# Patient Record
Sex: MALE | Race: OTHER | NOT HISPANIC OR LATINO | Employment: OTHER | URBAN - METROPOLITAN AREA
[De-identification: names, ages, dates, MRNs, and addresses within clinical notes are randomized per-mention and may not be internally consistent; named-entity substitution may affect disease eponyms.]

---

## 2017-01-17 ENCOUNTER — HOSPITAL ENCOUNTER (OUTPATIENT)
Dept: RADIOLOGY | Facility: HOSPITAL | Age: 82
Discharge: HOME | End: 2017-01-17
Attending: FAMILY MEDICINE
Payer: MEDICARE

## 2017-01-17 ENCOUNTER — HOSPITAL ENCOUNTER (EMERGENCY)
Facility: HOSPITAL | Age: 82
Discharge: LEFT BEFORE DISCHARGE | End: 2017-01-17
Attending: EMERGENCY MEDICINE
Payer: MEDICARE

## 2017-01-17 ENCOUNTER — OFFICE VISIT (OUTPATIENT)
Dept: INTERNAL MEDICINE | Facility: CLINIC | Age: 82
End: 2017-01-17
Payer: MEDICARE

## 2017-01-17 VITALS
HEART RATE: 64 BPM | BODY MASS INDEX: 27.09 KG/M2 | SYSTOLIC BLOOD PRESSURE: 162 MMHG | TEMPERATURE: 98 F | HEIGHT: 60 IN | OXYGEN SATURATION: 98 % | WEIGHT: 138 LBS | DIASTOLIC BLOOD PRESSURE: 86 MMHG

## 2017-01-17 VITALS
TEMPERATURE: 98 F | HEIGHT: 60 IN | HEART RATE: 67 BPM | OXYGEN SATURATION: 96 % | RESPIRATION RATE: 18 BRPM | BODY MASS INDEX: 26.5 KG/M2 | DIASTOLIC BLOOD PRESSURE: 71 MMHG | WEIGHT: 135 LBS | SYSTOLIC BLOOD PRESSURE: 139 MMHG

## 2017-01-17 DIAGNOSIS — M25.562 LEFT KNEE PAIN: Primary | ICD-10-CM

## 2017-01-17 DIAGNOSIS — M25.562 LEFT KNEE PAIN, UNSPECIFIED CHRONICITY: ICD-10-CM

## 2017-01-17 DIAGNOSIS — M25.562 LEFT KNEE PAIN, UNSPECIFIED CHRONICITY: Primary | ICD-10-CM

## 2017-01-17 PROCEDURE — 99282 EMERGENCY DEPT VISIT SF MDM: CPT | Mod: 27

## 2017-01-17 PROCEDURE — 99999 PR PBB SHADOW E&M-EST. PATIENT-LVL IV: CPT | Mod: PBBFAC,,, | Performed by: FAMILY MEDICINE

## 2017-01-17 PROCEDURE — 99213 OFFICE O/P EST LOW 20 MIN: CPT | Mod: S$PBB,,, | Performed by: FAMILY MEDICINE

## 2017-01-17 PROCEDURE — 73562 X-RAY EXAM OF KNEE 3: CPT | Mod: 26,LT,, | Performed by: RADIOLOGY

## 2017-01-17 PROCEDURE — 73560 X-RAY EXAM OF KNEE 1 OR 2: CPT | Mod: 26,59,RT, | Performed by: RADIOLOGY

## 2017-01-17 RX ORDER — IBUPROFEN 800 MG/1
800 TABLET ORAL 3 TIMES DAILY
Qty: 20 TABLET | Refills: 1 | Status: ON HOLD | OUTPATIENT
Start: 2017-01-17 | End: 2017-11-26 | Stop reason: HOSPADM

## 2017-01-17 NOTE — ED PROVIDER NOTES
SCRIBE #1 NOTE: I, Phillip Sanchez, am scribing for, and in the presence of, Ya Hare MD. I have scribed the entire note.      History      Chief Complaint   Patient presents with    Knee Pain     c/o left knee pain, hurts to bend        Review of patient's allergies indicates:  No Known Allergies     HPI   HPI    1/17/2017, 10:46 AM   History obtained from the daughter as  and patient (Offered , daughter states she wanted to translate for him)      History of Present Illness: Long Hines is a 87 y.o. male patient who presents to the Emergency Department for L knee pain  which onset gradually 2 months ago. Symptoms are intermittent and moderate in severity. Sx are exacerbated by nothing and relieved by nothing. No other sxs reported. Pt denies any recent injury. Patient denies any fever, N/V/D, chills, weakness/numbness, swelling, ecchymosis,  and all other sxs at this time. No further complaints or concerns at this time.     Arrival mode: Personal vehicle     PCP: Penny Watson DO       Past Medical History:  No past medical history on file.    Past Surgical History:  Past Surgical History   Procedure Laterality Date    Abdominal surgery           Family History:  Family History   Problem Relation Age of Onset    Cancer Neg Hx     Diabetes Neg Hx     Heart disease Neg Hx     Hypertension Neg Hx     Kidney disease Neg Hx     Stroke Neg Hx        Social History:  Social History     Social History Main Topics    Smoking status: Former Smoker    Smokeless tobacco: Unknown    Alcohol use No    Drug use: No    Sexual activity: Unknown       ROS   Review of Systems   Constitutional: Negative for chills and fever.   HENT: Negative for congestion and sore throat.    Respiratory: Negative for chest tightness and shortness of breath.    Cardiovascular: Negative for chest pain.   Gastrointestinal: Negative for abdominal pain, nausea and vomiting.   Genitourinary: Negative for  "difficulty urinating and dysuria.   Musculoskeletal: Positive for arthralgias (L knee). Negative for back pain and neck pain.   Skin: Negative for rash.   Neurological: Negative for dizziness, numbness and headaches.   Psychiatric/Behavioral: Negative for agitation and confusion.   All other systems reviewed and are negative.      Physical Exam    Initial Vitals   BP Pulse Resp Temp SpO2   01/17/17 1031 01/17/17 1031 01/17/17 1031 01/17/17 1031 01/17/17 1031   139/71 67 18 98 °F (36.7 °C) 96 %      Physical Exam  Nursing Notes and Vital Signs Reviewed.  Constitutional: Patient is in no apparent distress. Awake and alert. Well-developed and well-nourished.  Head: Atraumatic. Normocephalic.  Eyes: PERRL. EOM intact. Conjunctivae are not pale. No scleral icterus.  ENT: Mucous membranes are moist.     Neck: Supple. Full ROM. No JVD.   Cardiovascular: Regular rate. Regular rhythm. No murmurs, rubs, or gallops. Distal pulses are 2+ and symmetric.  Pulmonary/Chest: No respiratory distress. Clear to auscultation bilaterally. No wheezing, rales, or rhonchi.  Abdominal: Soft and non-distended.    Musculoskeletal: Moves all extremities. No obvious deformities. No edema. No calf tenderness.  Left Knee:  No obvious deformity. There is no swelling.  There is no tenderness.  No increased warmth, erythema, induration or fluctuance.  No ligament laxity.  No crepitance appreciated when ROM. DP and PT pulses are 2+.  Normal capillary refill.  Distal sensation is intact.  Skin: Warm and dry.  Neurological:  Alert, awake, and appropriate.  Normal speech.  No acute focal neurological deficits are appreciated.  Psychiatric: Normal affect. Good eye contact. Appropriate in content.    ED Course    Procedures  ED Vital Signs:  Vitals:    01/17/17 1031   BP: 139/71   Pulse: 67   Resp: 18   Temp: 98 °F (36.7 °C)   TempSrc: Oral   SpO2: 96%   Weight: 61.2 kg (135 lb)   Height: 4' 10" (1.473 m)       Abnormal Lab Results:  Labs Reviewed - No " data to display         Imaging Results:  Imaging Results     None       Canceled secondary to pt eloping out of the ED           The Emergency Provider reviewed the vital signs and test results, which are outlined above.    ED Discussion     11:03 AM: Pt has eloped at this time. Pt has eloped before the x-ray could be completed.     ED Medication(s):  Medications - No data to display    There are no discharge medications for this patient.            Medical Decision Making              Scribe Attestation:   Scribe #1: I performed the above scribed service and the documentation accurately describes the services I performed. I attest to the accuracy of the note.    Attending:   Physician Attestation Statement for Scribe #1: I, Ya Hare MD, personally performed the services described in this documentation, as scribed by Phillip Sanchez, in my presence, and it is both accurate and complete.          Clinical Impression       ICD-10-CM ICD-9-CM   1. Left knee pain M25.562 719.46       Disposition:   Disposition: Eloped  Condition: Stable         Ya Hare MD  01/17/17 2159

## 2017-01-17 NOTE — PROGRESS NOTES
Subjective:       Patient ID: Long Hines is a 87 y.o. male.    Chief Complaint: Knee Pain    HPI Daughter presents with patient to translate. Difficult to get history because she enters saying her father's knee hurts and he needs xray and prescription.     Knee Pain: Patient presents with knee swelling involving the  left knee. Onset of the symptoms was 2 months ago. Inciting event: none known. Current symptoms include swelling. Pain is aggravated by bending at the knee.  Patient has had no prior knee problems. Evaluation to date: none. Treatment to date: none.    Not taking anything over the counter for the pain.   Pain reported to have started in foot and went up to knee.   Pain is off and on.   He hasn't done anything to see if that would make it better. Bending it makes the pain worse. He gardens a lot.      Review of Systems   Constitutional: Negative for activity change and appetite change.   Cardiovascular: Negative for leg swelling.   Musculoskeletal: Negative for back pain, gait problem and neck pain.         Objective:        Physical Exam   Constitutional: He appears well-developed and well-nourished.   HENT:   Head: Normocephalic and atraumatic.   Musculoskeletal:        Legs:  Nursing note and vitals reviewed.        Assessment/Plan:     Left knee pain, unspecified chronicity  -     X-ray Knee Ortho Left; Future; Expected date: 1/17/17  -     ibuprofen (ADVIL,MOTRIN) 800 MG tablet; Take 1 tablet (800 mg total) by mouth 3 (three) times daily.  Dispense: 20 tablet; Refill: 1  -     US Soft Tissue Misc; Future; Expected date: 1/17/17  Findings:  AP standing views of both knees as well as lateral and Merchant views of the left knee and a Merchant view of the right knee were obtained.  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  Suprapatellar joint effusion present on the left.  No acute fracture or dislocation.    Potential for baker's cyst considering history. Will follow up on ultrasound.       Informed daughter to be careful with medication because of potential complications with the stomach and blood pressure. Ibuprofen is not something that I would recommend he take daily and if he does do not take this daily for more than 1-2 weeks.     No Follow-up on file.    Le Henson MD  ON   Family Medicine

## 2017-01-17 NOTE — MR AVS SNAPSHOT
[facility]  [address] [address]  Phone: [phone]  Fax: [phone]                  Long Hines   2017 11:20 AM   Office Visit    Description:  Male : 1929   Provider:  Le Henson MD   Department:  [facility] - Internal Medicine           Reason for Visit     Knee Pain           Diagnoses this Visit        Comments    Left knee pain, unspecified chronicity    -  Primary            To Do List           Future Appointments        Provider Department Dept Phone    2017 1:15 PM ON XR1- [facility] [phone]    2017 3:30 PM ON US1 [facility] [phone]      Goals (5 Years of Data)     None       These Medications        Disp Refills Start End    ibuprofen (ADVIL,MOTRIN) 800 MG tablet 20 tablet 1 2017     Take 1 tablet (800 mg total) by mouth 3 (three) times daily. - Oral    Pharmacy: [facility] [address] [place] [address] AT [facility] Ph #: [phone]         [facility]     [facility] Care [facility] -  Assistance  Registered nurses in the [facility] provide clinical advisement, health education, appointment booking, and other advisory services.  Call for this free service at [phone].             Medications           START taking these NEW medications        Refills    ibuprofen (ADVIL,MOTRIN) 800 MG tablet 1    Sig: Take 1 tablet (800 mg total) by mouth 3 (three) times daily.    Class: Normal    Route: Oral           Verify that the below list of medications is an accurate representation of the medications you are currently taking.  If none reported, the list may be blank. If incorrect, please contact your healthcare provider. Carry this list with you in case of emergency.           Current Medications     ibuprofen (ADVIL,MOTRIN) 800 MG tablet Take 1 tablet (800 mg total) by mouth 3 (three) times daily.  "          Clinical Reference Information           Vital Signs - Last Recorded  Most recent update: 1/17/2017 11:22 AM by Jessica Ron LPN    BP Pulse Temp Ht Wt SpO2    (!) 162/86 64 97.8 °F (36.6 °C) (Tympanic) 4' 10" (1.473 m) 62.6 kg (138 lb 0.1 oz) 98%    BMI                28.84 kg/m2          Blood Pressure          Most Recent Value    BP  (!)  162/86      Allergies as of 1/17/2017     No Known Allergies      Immunizations Administered on Date of Encounter - 1/17/2017     None      Orders Placed During Today's Visit     Future Labs/Procedures Expected by Expires    US Soft Tissue Misc  1/17/2017 1/17/2018    X-ray Knee Ortho Left  1/17/2017 1/17/2018      [facility] Sign-Up     Activating your [facility] account is as easy as 1-2-3!     1) Visit [email or web address], select Sign Up Now, enter this activation code and your date of birth, then select Next.  [email or web address]  Expires: 3/3/2017 11:50 AM      2) Create a username and password to use when you visit [facility] in the future and select a security question in case you lose your password and select Next.    3) Enter your e-mail address and click Sign Up!    Additional Information  If you have questions, please e-mail [email or web address] or call [phone] to talk to our [facility] staff. Remember, [facility] is NOT to be used for urgent needs. For medical emergencies, dial 911.         "

## 2017-01-18 ENCOUNTER — TELEPHONE (OUTPATIENT)
Dept: INTERNAL MEDICINE | Facility: CLINIC | Age: 82
End: 2017-01-18

## 2017-01-18 NOTE — TELEPHONE ENCOUNTER
Pt rescheduled US for next week, Tues 01/24/17, scheduled appt with Ms Fitzpatrick.  pts daughter, Marino verbalized understanding.

## 2017-01-18 NOTE — TELEPHONE ENCOUNTER
----- Message from Le Henson MD sent at 1/18/2017  9:37 AM CST -----  Please schedule ortho appointment. Please call daughter to schedule. He is scheduled for his ultrasound today.     Thanks

## 2017-01-18 NOTE — ED NOTES
Elope status noted-chart and provider note reviewed. Diagnostics resulted prior to departure, no further actions indicated or taken at this time.

## 2017-01-23 ENCOUNTER — TELEPHONE (OUTPATIENT)
Dept: RADIOLOGY | Facility: HOSPITAL | Age: 82
End: 2017-01-23

## 2017-01-24 ENCOUNTER — HOSPITAL ENCOUNTER (OUTPATIENT)
Dept: RADIOLOGY | Facility: HOSPITAL | Age: 82
Discharge: HOME | End: 2017-01-24
Attending: FAMILY MEDICINE
Payer: MEDICARE

## 2017-01-24 ENCOUNTER — OFFICE VISIT (OUTPATIENT)
Dept: ORTHOPEDICS | Facility: CLINIC | Age: 82
End: 2017-01-24
Payer: MEDICARE

## 2017-01-24 ENCOUNTER — TELEPHONE (OUTPATIENT)
Dept: INTERNAL MEDICINE | Facility: CLINIC | Age: 82
End: 2017-01-24

## 2017-01-24 VITALS
WEIGHT: 141 LBS | SYSTOLIC BLOOD PRESSURE: 157 MMHG | HEART RATE: 72 BPM | DIASTOLIC BLOOD PRESSURE: 76 MMHG | HEIGHT: 60 IN | BODY MASS INDEX: 27.68 KG/M2

## 2017-01-24 DIAGNOSIS — M76.30 ILIOTIBIAL BAND SYNDROME, UNSPECIFIED LATERALITY: Primary | ICD-10-CM

## 2017-01-24 DIAGNOSIS — M25.562 LEFT KNEE PAIN, UNSPECIFIED CHRONICITY: ICD-10-CM

## 2017-01-24 DIAGNOSIS — M17.12 PRIMARY OSTEOARTHRITIS OF LEFT KNEE: ICD-10-CM

## 2017-01-24 PROCEDURE — 99203 OFFICE O/P NEW LOW 30 MIN: CPT | Mod: S$PBB,,, | Performed by: PHYSICIAN ASSISTANT

## 2017-01-24 PROCEDURE — 99213 OFFICE O/P EST LOW 20 MIN: CPT | Mod: PBBFAC | Performed by: PHYSICIAN ASSISTANT

## 2017-01-24 PROCEDURE — 76882 US LMTD JT/FCL EVL NVASC XTR: CPT | Mod: 26,,, | Performed by: RADIOLOGY

## 2017-01-24 PROCEDURE — 99999 PR PBB SHADOW E&M-EST. PATIENT-LVL III: CPT | Mod: PBBFAC,,, | Performed by: PHYSICIAN ASSISTANT

## 2017-01-24 RX ORDER — DICLOFENAC SODIUM 10 MG/G
2 GEL TOPICAL 4 TIMES DAILY
Qty: 1 TUBE | Refills: 1 | Status: SHIPPED | OUTPATIENT
Start: 2017-01-24 | End: 2022-01-21

## 2017-01-24 NOTE — PATIENT INSTRUCTIONS
Iliotibial Band Stretch (Flexibility)    1. Stand next to a chair. Hold onto the chair with your right hand for support. Cross your right leg behind your left leg.  2. Lean your right hip toward the right. Feel the stretch at the outside of your hip.  3. Hold for 30 to 60 seconds. Then relax.  4. Repeat 2 times, or as instructed.  5. Switch sides and repeat.  6. Do this 3 times a day, or as instructed.     Tip: Dont bend forward or twist at the waist.   © 2054-0605 [employer]. [address] [place] [address]. All rights reserved. This information is not intended as a substitute for professional medical care. Always follow your healthcare professional's instructions.        Treatment for Iliotibial Band Syndrome  Iliotibial band syndrome, or IT band syndrome, is a condition that causes pain on the outside of the knee. It most often occurs in athletes, especially long-distance runners. It can happen if you cycle, ski, row, or play soccer. It can also occur in people who are starting to exercise.  Types of treatment  Treatment may include:  · Avoiding any activity that makes your knee pain worse for a while (like running), and returning to this activity slowly over time  · Icing the outside of your knee when it causes you pain  · Taking over-the-counter pain medicines  · Having corticosteroid injections, to reduce inflammation  · Making changes to your activity, like lowering your bicycle seat for cycling or improving your running form  · Practicing special exercises to stretch and strengthen the muscles around your hip and your knee  You may find it helpful to work with a physical therapist.  These treatments help most people with IT band syndrome. Your doctor may advise surgery if you still have severe symptoms after 6 months of other treatment. Your doctor will talk with you about the types of surgery.  Preventing IT band syndrome  You may be able to prevent IT band syndrome if  you:  · Run on even surfaces  · Replace your running shoes often  · Ease up on your training  · On a track, make sure you run in both directions  · Have an expert check your stance for running and other sporting activities  · Stretch your outer thigh and hamstrings often  If you are new to exercise, start slowly. Increase your activity over time.  Talk with your doctor or  for more advice.  When to call the healthcare provider  Call your healthcare provider right away if you have any of these:  · Symptoms that get worse, or dont get better with treatment  · New symptoms   © 8450-3338 [employer]. [address] [place] [address]. All rights reserved. This information is not intended as a substitute for professional medical care. Always follow your healthcare professional's instructions.        Understanding Iliotibial Band Syndrome  Iliotibial band syndrome, or IT band syndrome, is a condition that causes pain on the outside of the knee. It most often occurs in athletes, especially long-distance runners. It can happen if you cycle, ski, row, or play soccer. It can also occur in people who are starting to exercise.  What is the IT band?  The iliotibial (IT) band is a strong, thick band of tissue that runs down the outside of your thigh. It goes all the way from your hip bones to the top of your shinbone (tibia), just below your knee joint. The bones of your knee joint include your tibia, your thighbone (femur), and your kneecap (patella).  When you bend and straighten your leg, the IT band moves over the outer lower edge of your femur. Over time, bending and straightening your leg can cause the IT band to irritate nearby tissues and cause pain.  What causes IT band syndrome?  Researchers are still learning the exact cause of IT band syndrome. The pain may be caused by the IT band rubbing over the lower outer edge of the femur. This may cause inflammation in the bone, tendons, and small  fluid-filled sacs (bursa) in the area. The IT band may also compress the tissue under it and cause pain.  If you are a runner, you might be more likely to develop IT band syndrome if:  · You run on uneven or downhill terrain  · You run on worn-out shoes  · You run many miles per day  · Your legs slope a little inward from your knee to your ankle (bowlegs)  Symptoms of IT band syndrome  IT band syndrome causes pain on the outside of the knee. It might affect one or both of your knees. The pain is an aching, burning feeling that can spread up the thigh to the hip. You may feel this pain only when you exercise, such as while running. The pain may be worst right after you step on your foot. It may only happen near the end of your exercise. As the condition gets worse, pain may start earlier and continue after you have stopped exercising. Going up and down the stairs may make the pain worse.  Diagnosing IT band syndrome  Your doctor will ask about your health history and your symptoms. He or she will give you a physical exam. This will include an exam of your knee. The range of motion and strength of your knee will be tested. The doctor will look for areas of pain around your knee. The symptoms of IT band syndrome can be like those of osteoarthritis or a meniscal tear. Your doctor will need to make sure IT band syndrome is the cause of your symptoms. If the diagnosis is not clear, you may need imaging tests. These can include an X-ray or MRI scan.  © 4294-2287 [employer]. [address] [place] [address], [place] [address]. All rights reserved. This information is not intended as a substitute for professional medical care. Always follow your healthcare professional's instructions.

## 2017-01-24 NOTE — MR AVS SNAPSHOT
[facility]  [address] [facility]  [place] [address]  Phone: [phone]  Fax: [phone]                  Long Hines   2017 3:30 PM   Office Visit    Description:  Male : 1929   Provider:  Glenn Fitzpatrick PA-C   Department:  [facility]           Reason for Visit     Left Knee - Pain           Diagnoses this Visit        Comments    Iliotibial band syndrome, unspecified laterality    -  Primary     Primary osteoarthritis of left knee                To Do List           Future Appointments        Provider Department Dept Phone    2017 3:30 PM Glenn Fitzpatrick PA-C [facility] [phone]      Goals (5 Years of Data)     None       These Medications        Disp Refills Start End    diclofenac sodium 1 % Gel 1 Tube 1 2017 2/3/2017    Apply 2 g topically 4 (four) times daily. - Topical    Pharmacy: [facility] [address] [place] [address] Ph #: [phone]         [facility] On Call     [facility] On Call Nurse Care Line -  Assistance  Registered nurses in the [facility] On Call Center provide clinical advisement, health education, appointment booking, and other advisory services.  Call for this free service at [phone].             Medications           START taking these NEW medications        Refills    diclofenac sodium 1 % Gel 1    Sig: Apply 2 g topically 4 (four) times daily.    Class: Normal    Route: Topical           Verify that the below list of medications is an accurate representation of the medications you are currently taking.  If none reported, the list may be blank. If incorrect, please contact your healthcare provider. Carry this list with you in case of emergency.           Current Medications     diclofenac sodium 1 % Gel Apply 2 g topically 4 (four) times daily.    ibuprofen (ADVIL,MOTRIN) 800 MG tablet Take 1 tablet (800 mg total) by mouth 3 (three) times daily.  "          Clinical Reference Information           Vital Signs - Last Recorded  Most recent update: 1/24/2017  2:06 PM by Alyssa Odell    BP Pulse Ht Wt BMI    (!) 157/76 (BP Location: Left arm, Patient Position: Sitting, BP Method: Automatic) 72 4' 10.5" (1.486 m) 64 kg (140 lb 15.8 oz) 28.96 kg/m2      Blood Pressure          Most Recent Value    BP  (!)  157/76      Allergies as of 1/24/2017     No Known Allergies      Immunizations Administered on Date of Encounter - 1/24/2017     None      Orders Placed During Today's Visit      Normal Orders This Visit    Ambulatory Referral to Physical/Occupational Therapy       [employer] Sign-Up     Activating your [facility] account is as easy as 1-2-3!     1) Visit [email or web address], select Sign Up Now, enter this activation code and your date of birth, then select Next.  [email or web address]  Expires: 3/3/2017 11:50 AM      2) Create a username and password to use when you visit [facility] in the future and select a security question in case you lose your password and select Next.    3) Enter your e-mail address and click Sign Up!    Additional Information  If you have questions, please e-mail [email or web address] or call [phone] to talk to our [facility] staff. Remember, [facility] is NOT to be used for urgent needs. For medical emergencies, dial 911.         Instructions      Iliotibial Band Stretch (Flexibility)    1. Stand next to a chair. Hold onto the chair with your right hand for support. Cross your right leg behind your left leg.  2. Lean your right hip toward the right. Feel the stretch at the outside of your hip.  3. Hold for 30 to 60 seconds. Then relax.  4. Repeat 2 times, or as instructed.  5. Switch sides and repeat.  6. Do this 3 times a day, or as instructed.     Tip: Dont bend forward or twist at the waist.   © 5910-3671 [employer] [address] [place] [address], [place] [address]. All rights reserved. This information is not intended as a " substitute for professional medical care. Always follow your healthcare professional's instructions.        Treatment for Iliotibial Band Syndrome  Iliotibial band syndrome, or IT band syndrome, is a condition that causes pain on the outside of the knee. It most often occurs in athletes, especially long-distance runners. It can happen if you cycle, ski, row, or play soccer. It can also occur in people who are starting to exercise.  Types of treatment  Treatment may include:  · Avoiding any activity that makes your knee pain worse for a while (like running), and returning to this activity slowly over time  · Icing the outside of your knee when it causes you pain  · Taking over-the-counter pain medicines  · Having corticosteroid injections, to reduce inflammation  · Making changes to your activity, like lowering your bicycle seat for cycling or improving your running form  · Practicing special exercises to stretch and strengthen the muscles around your hip and your knee  You may find it helpful to work with a physical therapist.  These treatments help most people with IT band syndrome. Your doctor may advise surgery if you still have severe symptoms after 6 months of other treatment. Your doctor will talk with you about the types of surgery.  Preventing IT band syndrome  You may be able to prevent IT band syndrome if you:  · Run on even surfaces  · Replace your running shoes often  · Ease up on your training  · On a track, make sure you run in both directions  · Have an expert check your stance for running and other sporting activities  · Stretch your outer thigh and hamstrings often  If you are new to exercise, start slowly. Increase your activity over time.  Talk with your doctor or  for more advice.  When to call the healthcare provider  Call your healthcare provider right away if you have any of these:  · Symptoms that get worse, or dont get better with treatment  · New symptoms   © 3604-7563 [employer] [address] [place] [address], [place] [address]. All rights reserved. This information is not intended as a substitute for professional medical care. Always follow your healthcare professional's instructions.        Understanding Iliotibial Band Syndrome  Iliotibial band syndrome, or IT band syndrome, is a condition that causes pain on the outside of the knee. It most often occurs in athletes, especially long-distance runners. It can happen if you cycle, ski, row, or play soccer. It can also occur in people who are starting to exercise.  What is the IT band?  The iliotibial (IT) band is a strong, thick band of tissue that runs down the outside of your thigh. It goes all the way from your hip bones to the top of your shinbone (tibia), just below your knee joint. The bones of your knee joint include your tibia, your thighbone (femur), and your kneecap (patella).  When you bend and straighten your leg, the IT band moves over the outer lower edge of your femur. Over time, bending and straightening your leg can cause the IT band to irritate nearby tissues and cause pain.  What causes IT band syndrome?  Researchers are still learning the exact cause of IT band syndrome. The pain may be caused by the IT band rubbing over the lower outer edge of the femur. This may cause inflammation in the bone, tendons, and small fluid-filled sacs (bursa) in the area. The IT band may also compress the tissue under it and cause pain.  If you are a runner, you might be more likely to develop IT band syndrome if:  · You run on uneven or downhill terrain  · You run on worn-out shoes  · You run many miles per day  · Your legs slope a little inward from your knee to your ankle (bowlegs)  Symptoms of IT band syndrome  IT band syndrome causes pain on the outside of the knee. It might affect one or both of your knees. The pain is an aching, burning feeling that can spread up the thigh to the hip. You may feel this pain only when you  exercise, such as while running. The pain may be worst right after you step on your foot. It may only happen near the end of your exercise. As the condition gets worse, pain may start earlier and continue after you have stopped exercising. Going up and down the stairs may make the pain worse.  Diagnosing IT band syndrome  Your doctor will ask about your health history and your symptoms. He or she will give you a physical exam. This will include an exam of your knee. The range of motion and strength of your knee will be tested. The doctor will look for areas of pain around your knee. The symptoms of IT band syndrome can be like those of osteoarthritis or a meniscal tear. Your doctor will need to make sure IT band syndrome is the cause of your symptoms. If the diagnosis is not clear, you may need imaging tests. These can include an X-ray or MRI scan.  © 8386-7184 [employer]. [address] [place] [address], [place] [address]. All rights reserved. This information is not intended as a substitute for professional medical care. Always follow your healthcare professional's instructions.

## 2017-01-24 NOTE — PROGRESS NOTES
Subjective:      Patient ID: Long Hines is a 87 y.o. male.    Chief Complaint: Pain of the Left Knee    HPI Comments: Body part: Left Knee    Occupation: Never Worked in [place].  Previously a farmer.    Dominant hand: Right    Referred by: Le Henson MD    Date of Injury: None    Patient's visit goal: To find out what is wrong with his knee    Problem Description: Left Knee Pain since October 2016 without known cause.  He describes the discomfort in the lateral aspect of the knee and mainly when he flexes.  He complains of decreased flexion in that knee.  Intermittent swelling, but very mild..  Actually, he speaks no English.  He has lived in the [place] upwards of 25 years and has not worked since he has been here.  He lives with his daughter who is present to the examination and does all of the translating.  He saw urgent care and was given ibuprofen that has not helped his symptoms greatly.  No ice or heat.  No physical therapy.  No injections.  No history of knee complications.          Pain   This is a new problem. The current episode started more than 1 month ago (pain since about October 2016). The problem occurs intermittently. The problem has been gradually improving. Pertinent negatives include no abdominal pain, chest pain, chills, congestion, coughing, fever, joint swelling, nausea, numbness, rash or vomiting. The symptoms are aggravated by bending. He has tried NSAIDs for the symptoms. The treatment provided moderate relief.       Review of Systems   Constitution: Negative for chills, fever and weight loss.   HENT: Negative for congestion and hearing loss.    Eyes: Negative for double vision and pain.   Cardiovascular: Negative for chest pain and irregular heartbeat.   Respiratory: Negative for cough and shortness of breath.    Endocrine: Negative for polyuria.   Hematologic/Lymphatic: Does not bruise/bleed easily.   Skin: Negative for poor wound healing, rash and suspicious  lesions.   Musculoskeletal: Positive for joint pain. Negative for arthritis and joint swelling.   Gastrointestinal: Negative for abdominal pain, nausea and vomiting.   Genitourinary: Negative for bladder incontinence and frequency.   Neurological: Negative for loss of balance, numbness, paresthesias, sensory change and tremors.   Psychiatric/Behavioral: Negative for depression. The patient is not nervous/anxious.    Allergic/Immunologic: Negative for hives.         Objective:            General    Nursing note and vitals reviewed.  Constitutional: He is oriented to person, place, and time. He appears well-developed and well-nourished. No distress.   Neurological: He is alert and oriented to person, place, and time.   Psychiatric: He has a normal mood and affect. His behavior is normal. Judgment and thought content normal.     General Musculoskeletal Exam   Gait: abnormal       Right Knee Exam     Inspection   Erythema: absent  Swelling: present  Effusion: effusion    Tenderness   The patient is experiencing no tenderness.         Range of Motion   Extension: -5   Flexion: normal     Tests   Ligament Examination   MCL - Valgus: normal (0 to 2mm)  LCL - Varus: normal    Other   Muscle Tightness: hamstring tightness and quadriceps tightness    Left Knee Exam     Inspection   Erythema: absent  Swelling: absent  Effusion: absent    Tenderness   The patient tender to palpation of the iliotibial band.    Range of Motion   Extension: -5   Flexion: 100     Tests   Stability   MCL - Valgus: normal (0 to 2mm)  LCL - Varus: normal (0 to 2mm)    Other   Muscle Tightness: hamstring tightness and quadriceps tightness    Comments:  Clinical tenderness along the IT band.  Tightness of the band appreciated palpation.    Muscle Strength   Right Lower Extremity   Hip Abduction: 4/5   Quadriceps:  4/5   Hamstrin/5   Left Lower Extremity   Hip Abduction: 4/5   Quadriceps:  4/5   Hamstrin/5     Vascular Exam       Edema  Right  Lower Leg: absent  Left Lower Leg: absent              Assessment:       Encounter Diagnoses   Name Primary?    Iliotibial band syndrome, unspecified laterality Yes    Primary osteoarthritis of left knee           Plan:       Long was seen today for pain.    Diagnoses and all orders for this visit:    Iliotibial band syndrome, unspecified laterality  -     Ambulatory Referral to Physical/Occupational Therapy    Primary osteoarthritis of left knee  -     Ambulatory Referral to Physical/Occupational Therapy    Other orders  -     diclofenac sodium 1 % Gel; Apply 2 g topically 4 (four) times daily.   he will participate in a therapy program at [facility], close to his home.  He'll use a Voltaren cream as well.  I have not prescribed any oral anti-inflammatory medication.  He can use moist heat as well.  Although he does have degenerative arthritis in both knees, his symptoms appear to be more muscular in nature.  We'll treat him accordingly.    The patient understands, chooses and consents to this plan and accepts all   the risks which include but are not limited to the risks mentioned above.   Pt understands the alternative of having no testing, intervention or       treatment at this time. Pt left content and without questions.     Disclaimer: This note was prepared using a voice recognition system and is likely to have sound alike errors within the text.

## 2017-02-07 DIAGNOSIS — M17.12 PRIMARY OSTEOARTHRITIS OF LEFT KNEE: ICD-10-CM

## 2017-02-07 DIAGNOSIS — M76.30 ILIOTIBIAL BAND SYNDROME, UNSPECIFIED LATERALITY: Primary | ICD-10-CM

## 2017-07-19 ENCOUNTER — OFFICE VISIT (OUTPATIENT)
Dept: INTERNAL MEDICINE | Facility: CLINIC | Age: 82
End: 2017-07-19
Payer: MEDICARE

## 2017-07-19 ENCOUNTER — HOSPITAL ENCOUNTER (OUTPATIENT)
Dept: RADIOLOGY | Facility: HOSPITAL | Age: 82
Discharge: HOME | End: 2017-07-19
Attending: FAMILY MEDICINE
Payer: MEDICARE

## 2017-07-19 VITALS
DIASTOLIC BLOOD PRESSURE: 60 MMHG | BODY MASS INDEX: 27.14 KG/M2 | OXYGEN SATURATION: 97 % | HEIGHT: 60 IN | SYSTOLIC BLOOD PRESSURE: 120 MMHG | WEIGHT: 138.25 LBS | HEART RATE: 52 BPM | TEMPERATURE: 97 F

## 2017-07-19 DIAGNOSIS — Z13.220 SCREENING CHOLESTEROL LEVEL: ICD-10-CM

## 2017-07-19 DIAGNOSIS — R10.31 RIGHT LOWER QUADRANT ABDOMINAL PAIN: Primary | ICD-10-CM

## 2017-07-19 DIAGNOSIS — E66.3 OVERWEIGHT: ICD-10-CM

## 2017-07-19 DIAGNOSIS — R20.8 OTHER DISTURBANCES OF SKIN SENSATION: ICD-10-CM

## 2017-07-19 DIAGNOSIS — R10.31 RIGHT LOWER QUADRANT ABDOMINAL PAIN: ICD-10-CM

## 2017-07-19 PROCEDURE — 74020 XR ABDOMEN FLAT AND ERECT: CPT | Mod: TC

## 2017-07-19 PROCEDURE — 1159F MED LIST DOCD IN RCRD: CPT | Mod: ,,, | Performed by: FAMILY MEDICINE

## 2017-07-19 PROCEDURE — 99999 PR PBB SHADOW E&M-EST. PATIENT-LVL III: CPT | Mod: PBBFAC,,, | Performed by: FAMILY MEDICINE

## 2017-07-19 PROCEDURE — 99214 OFFICE O/P EST MOD 30 MIN: CPT | Mod: S$PBB,,, | Performed by: FAMILY MEDICINE

## 2017-07-19 PROCEDURE — 1125F AMNT PAIN NOTED PAIN PRSNT: CPT | Mod: ,,, | Performed by: FAMILY MEDICINE

## 2017-07-19 PROCEDURE — 74020 XR ABDOMEN FLAT AND ERECT: CPT | Mod: 26,,, | Performed by: RADIOLOGY

## 2017-07-19 RX ORDER — GABAPENTIN 100 MG/1
100 CAPSULE ORAL 3 TIMES DAILY
Qty: 90 CAPSULE | Refills: 1 | Status: SHIPPED | OUTPATIENT
Start: 2017-07-19 | End: 2017-12-12 | Stop reason: SDUPTHER

## 2017-07-19 NOTE — PROGRESS NOTES
Subjective:       Patient ID: Long Hines is a 87 y.o. male.    Chief Complaint: Abdominal Pain    HPI Mr. Hines presents today for abdominal pain. He presents with his daughter who translates for him. Had a surgery in [place] they are not sure what kind. It sounds like an appendectomy he was having pain prior and the scar is in the right lower quadrant.   The pain that he is complaining of today comes and goes and feels like a needle poking him. The pain is in the same location that he had his surgery. The pain lasts for seconds but it occurs repeatedly.     He has BM he says multiple times a day but it is a very small amount.     Review of Systems    Pertinent ROS listed in HPI    No past medical history on file.  Past Surgical History:   Procedure Laterality Date    ABDOMINAL SURGERY       Objective:        Physical Exam   Constitutional: He appears well-developed and well-nourished.   HENT:   Head: Normocephalic and atraumatic.   Abdominal: Bowel sounds are normal. He exhibits no distension. There is no tenderness. There is no guarding.   Neurological: He is alert.   Skin: Skin is warm.   Vitals reviewed.        Assessment/Plan:     Right lower quadrant abdominal pain  -     X-Ray Abdomen Flat And Erect; Future; Expected date: 07/19/2017  -     gabapentin (NEURONTIN) 100 MG capsule; Take 1 capsule (100 mg total) by mouth 3 (three) times daily.  Dispense: 90 capsule; Refill: 1  -     Comprehensive metabolic panel; Future; Expected date: 07/19/2017  -     Vitamin B12; Future; Expected date: 07/19/2017  Discussed surgery based on scar it may have been an appendectomy. I am not sure what he had done but the pain/sensation is in the area of the scar. This is why I started gabapentin at a low dose if it is related to nerve type pain.   Discussed possible constipation and need for a laxative will follow up on Xray results.   May consider CT in the future.     Screening cholesterol level  -     Lipid panel; Future;  Expected date: 07/19/2017    Overweight   -     Lipid panel; Future; Expected date: 07/19/2017    Other disturbances of skin sensation   -     Vitamin B12; Future; Expected date: 07/19/2017        Return if symptoms worsen or fail to improve.    Le Henson MD  [facility]   Family Medicine

## 2017-07-20 ENCOUNTER — TELEPHONE (OUTPATIENT)
Dept: INTERNAL MEDICINE | Facility: CLINIC | Age: 82
End: 2017-07-20

## 2017-07-20 NOTE — TELEPHONE ENCOUNTER
----- Message from Le Henson MD sent at 7/20/2017  8:52 AM CDT -----  One of the fats in his blood is elevated. Decreasing sugar intake, fat, fried foods and alcohol if he drinks may help decrease this level. He may need to be started on a medication for this. Let me know if he wants to try exercise and changing diet first.

## 2017-07-20 NOTE — TELEPHONE ENCOUNTER
----- Message from Le Henson MD sent at 7/19/2017 12:39 PM CDT -----  He has a large amount of stool and gas on xray. Laxative such as miralax or magnesium citrate in the glass jar should help with cleaning him out. Recommend taking a laxative for assistance. He also needs to increase water intake.

## 2017-08-01 ENCOUNTER — TELEPHONE (OUTPATIENT)
Dept: INTERNAL MEDICINE | Facility: CLINIC | Age: 82
End: 2017-08-01

## 2017-08-01 DIAGNOSIS — E78.1 HYPERTRIGLYCERIDEMIA: Primary | ICD-10-CM

## 2017-08-01 NOTE — TELEPHONE ENCOUNTER
----- Message from Marilee Siddiqui sent at 7/28/2017  4:15 PM CDT -----  Contact: Marino Hines -[phone]  Would like to know test results. Please call back at [phone]    Thanks,  Marilee Siddiqui

## 2017-08-02 RX ORDER — FENOFIBRATE 54 MG/1
54 TABLET ORAL DAILY
Qty: 90 TABLET | Refills: 3 | Status: SHIPPED | OUTPATIENT
Start: 2017-08-02 | End: 2017-12-12 | Stop reason: SDUPTHER

## 2017-08-02 NOTE — TELEPHONE ENCOUNTER
Medication sent to pharmacy. Labs need to be checked again in 6 months. Follow up with PCP if needed.

## 2017-11-25 ENCOUNTER — HOSPITAL ENCOUNTER (INPATIENT)
Facility: HOSPITAL | Age: 82
LOS: 1 days | Discharge: HOME | DRG: 149 | End: 2017-11-26
Attending: EMERGENCY MEDICINE
Payer: MEDICARE

## 2017-11-25 DIAGNOSIS — I63.9 CEREBROVASCULAR ACCIDENT (CVA), UNSPECIFIED MECHANISM: ICD-10-CM

## 2017-11-25 DIAGNOSIS — R42 DIZZINESS: ICD-10-CM

## 2017-11-25 DIAGNOSIS — R53.1 WEAKNESS: ICD-10-CM

## 2017-11-25 DIAGNOSIS — R07.9 CHEST PAIN: ICD-10-CM

## 2017-11-25 DIAGNOSIS — R42 DIZZINESS OF UNKNOWN CAUSE: Primary | ICD-10-CM

## 2017-11-25 PROBLEM — E78.1 HYPERTRIGLYCERIDEMIA WITHOUT HYPERCHOLESTEROLEMIA: Status: ACTIVE | Noted: 2017-11-25

## 2017-11-25 LAB
ALBUMIN SERPL BCP-MCNC: 3.4 G/DL
ALP SERPL-CCNC: 79 U/L
ALT SERPL W/O P-5'-P-CCNC: 39 U/L
ANION GAP SERPL CALC-SCNC: 7 MMOL/L
AST SERPL-CCNC: 43 U/L
BASOPHILS # BLD AUTO: 0.02 K/UL
BASOPHILS NFR BLD: 0.5 %
BILIRUB SERPL-MCNC: 0.6 MG/DL
BNP SERPL-MCNC: 194 PG/ML
BUN SERPL-MCNC: 20 MG/DL
CALCIUM SERPL-MCNC: 9 MG/DL
CHLORIDE SERPL-SCNC: 107 MMOL/L
CO2 SERPL-SCNC: 25 MMOL/L
CREAT SERPL-MCNC: 0.9 MG/DL
DIFFERENTIAL METHOD: ABNORMAL
EOSINOPHIL # BLD AUTO: 0.2 K/UL
EOSINOPHIL NFR BLD: 4.4 %
ERYTHROCYTE [DISTWIDTH] IN BLOOD BY AUTOMATED COUNT: 12.7 %
EST. GFR  (AFRICAN AMERICAN): >60 ML/MIN/1.73 M^2
EST. GFR  (NON AFRICAN AMERICAN): >60 ML/MIN/1.73 M^2
GLUCOSE SERPL-MCNC: 99 MG/DL
HCT VFR BLD AUTO: 42.4 %
HGB BLD-MCNC: 15.3 G/DL
LYMPHOCYTES # BLD AUTO: 1.7 K/UL
LYMPHOCYTES NFR BLD: 40.4 %
MCH RBC QN AUTO: 31.1 PG
MCHC RBC AUTO-ENTMCNC: 36.1 G/DL
MCV RBC AUTO: 86 FL
MONOCYTES # BLD AUTO: 0.4 K/UL
MONOCYTES NFR BLD: 9.5 %
NEUTROPHILS # BLD AUTO: 1.9 K/UL
NEUTROPHILS NFR BLD: 45.2 %
PLATELET # BLD AUTO: 135 K/UL
PMV BLD AUTO: 10.8 FL
POTASSIUM SERPL-SCNC: 3.9 MMOL/L
PROT SERPL-MCNC: 7.4 G/DL
RBC # BLD AUTO: 4.92 M/UL
SODIUM SERPL-SCNC: 139 MMOL/L
TROPONIN I SERPL DL<=0.01 NG/ML-MCNC: 0.03 NG/ML
TROPONIN I SERPL DL<=0.01 NG/ML-MCNC: 0.04 NG/ML
TROPONIN I SERPL DL<=0.01 NG/ML-MCNC: 0.04 NG/ML
WBC # BLD AUTO: 4.11 K/UL

## 2017-11-25 PROCEDURE — G0378 HOSPITAL OBSERVATION PER HR: HCPCS

## 2017-11-25 PROCEDURE — 80053 COMPREHEN METABOLIC PANEL: CPT

## 2017-11-25 PROCEDURE — 93005 ELECTROCARDIOGRAM TRACING: CPT

## 2017-11-25 PROCEDURE — 84484 ASSAY OF TROPONIN QUANT: CPT

## 2017-11-25 PROCEDURE — 99285 EMERGENCY DEPT VISIT HI MDM: CPT | Mod: 25

## 2017-11-25 PROCEDURE — 93010 ELECTROCARDIOGRAM REPORT: CPT | Mod: 76,,, | Performed by: INTERNAL MEDICINE

## 2017-11-25 PROCEDURE — 83880 ASSAY OF NATRIURETIC PEPTIDE: CPT

## 2017-11-25 PROCEDURE — 93010 ELECTROCARDIOGRAM REPORT: CPT | Mod: ,,, | Performed by: INTERNAL MEDICINE

## 2017-11-25 PROCEDURE — 25000003 PHARM REV CODE 250

## 2017-11-25 PROCEDURE — 85025 COMPLETE CBC W/AUTO DIFF WBC: CPT

## 2017-11-25 PROCEDURE — A9585 GADOBUTROL INJECTION: HCPCS

## 2017-11-25 PROCEDURE — 84484 ASSAY OF TROPONIN QUANT: CPT | Mod: 91

## 2017-11-25 PROCEDURE — 21400001 HC TELEMETRY ROOM

## 2017-11-25 PROCEDURE — 25500020 PHARM REV CODE 255

## 2017-11-25 RX ORDER — GADOBUTROL 604.72 MG/ML
6 INJECTION INTRAVENOUS
Status: COMPLETED | OUTPATIENT
Start: 2017-11-25 | End: 2017-11-25

## 2017-11-25 RX ORDER — ASPIRIN 81 MG/1
81 TABLET ORAL DAILY
Status: DISCONTINUED | OUTPATIENT
Start: 2017-11-25 | End: 2017-11-25

## 2017-11-25 RX ORDER — ACETAMINOPHEN 325 MG/1
650 TABLET ORAL EVERY 6 HOURS PRN
Status: DISCONTINUED | OUTPATIENT
Start: 2017-11-25 | End: 2017-11-26 | Stop reason: HOSPADM

## 2017-11-25 RX ORDER — FENOFIBRATE 48 MG/1
48 TABLET, FILM COATED ORAL DAILY
Status: DISCONTINUED | OUTPATIENT
Start: 2017-11-25 | End: 2017-11-26 | Stop reason: HOSPADM

## 2017-11-25 RX ADMIN — ASPIRIN 81 MG: 81 TABLET, COATED ORAL at 04:11

## 2017-11-25 RX ADMIN — GADOBUTROL 6 ML: 604.72 INJECTION INTRAVENOUS at 04:11

## 2017-11-25 RX ADMIN — FENOFIBRATE 48 MG: 48 TABLET ORAL at 05:11

## 2017-11-25 NOTE — ED PROVIDER NOTES
SCRIBE #1 NOTE: I, Corinne Mack, am scribing for, and in the presence of, Khoa Tucker Jr., MD. I have scribed the entire note.      History      Chief Complaint   Patient presents with    Extremity Weakness    Dizziness       Review of patient's allergies indicates:  No Known Allergies     HPI   HPI    11/25/2017, 12:06 PM   History obtained from the patient   Interpreted by family      History of Present Illness: Long Roth is a 87 y.o. male patient who presents to the Emergency Department for weakness which onset gradually yesterday. Symptoms are constant and moderate in severity. Pt's family states that the pt is having trouble with his balance and is weaker to his LLE. No mitigating or exacerbating factors reported. Associated sxs include dizziness. Patient denies any fever, chills, CP, SOB, N/V/D, back pain, neck pain, HA, numbness, facial asymmetry, and all other sxs at this time. No prior Tx reported. No further complaints or concerns at this time.       Arrival mode: Personal vehicle      PCP: Penny Watson DO       Past Medical History:  No past medical history on file.    Past Surgical History:  Past Surgical History:   Procedure Laterality Date    ABDOMINAL SURGERY           Family History:  Family History   Problem Relation Age of Onset    Cancer Neg Hx     Diabetes Neg Hx     Heart disease Neg Hx     Hypertension Neg Hx     Kidney disease Neg Hx     Stroke Neg Hx        Social History:  Social History     Social History Main Topics    Smoking status: Former Smoker    Smokeless tobacco: Never Used    Alcohol use No    Drug use: No    Sexual activity: Not on file       ROS   Review of Systems   Constitutional: Negative for chills and fever.   Respiratory: Negative for cough and shortness of breath.    Cardiovascular: Negative for chest pain and leg swelling.   Gastrointestinal: Negative for abdominal pain, diarrhea, nausea and vomiting.   Musculoskeletal: Negative for back pain, neck  pain and neck stiffness.   Skin: Negative for rash and wound.   Neurological: Positive for dizziness and weakness. Negative for facial asymmetry, light-headedness, numbness and headaches.   All other systems reviewed and are negative.    Physical Exam      Initial Vitals [11/25/17 1138]   BP Pulse Resp Temp SpO2   (!) 121/56 60 16 98 °F (36.7 °C) 95 %      MAP       77.67          Physical Exam  Nursing Notes and Vital Signs Reviewed.  Constitutional: Patient is in no apparent distress. Well-developed and well-nourished. Elderly.  Head: Atraumatic. Normocephalic.  Eyes: PERRL. EOM intact. Conjunctivae are not pale. No scleral icterus.  Neck: Supple. Full ROM. No lymphadenopathy.  Cardiovascular: Regular rate. Regular rhythm. No murmurs, rubs, or gallops. Distal pulses are 2+ and symmetric.  Pulmonary/Chest: No respiratory distress. Clear to auscultation bilaterally. No wheezing or rales.  Abdominal: Soft and non-distended.    Musculoskeletal: Moves all extremities. No obvious deformities. No edema.   Skin: Warm and dry.  Neurological: Patient is alert and oriented to person, place and time. Pupils ERRL and EOM normal. Cranial nerves II-XII are intact. Strength is full bilaterally; it is equal and 5/5 in bilateral upper and R lower extremities. Strength is 4/5 to the LLE. There is no pronator drift of outstretched arms. Speech is clear and normal. No acute focal neurological deficits noted.  Psychiatric: Normal affect. Good eye contact. Appropriate in content.    ED Course    Procedures  ED Vital Signs:  Vitals:    11/25/17 1138 11/25/17 1445 11/25/17 1502 11/25/17 1510   BP: (!) 121/56 (!) 146/117 (!) 145/59 131/74   Pulse: 60 68 (!) 49 60   Resp: 16 18 17    Temp: 98 °F (36.7 °C)      TempSrc: Oral      SpO2: 95% 98% 98%    Weight: 61.2 kg (135 lb)      Height: 5' (1.524 m)       11/25/17 1512 11/25/17 1514 11/25/17 1657 11/25/17 1802   BP: (!) 115/97 (!) 115/97 137/66 133/76   Pulse: 67 63 60 (!) 54   Resp:  17 19  14   Temp:       TempSrc:       SpO2:  97% 98% 97%   Weight:       Height:        11/25/17 1815   BP:    Pulse:    Resp:    Temp: 98.2 °F (36.8 °C)   TempSrc: Oral   SpO2:    Weight:    Height:        Abnormal Lab Results:  Labs Reviewed   CBC W/ AUTO DIFFERENTIAL - Abnormal; Notable for the following:        Result Value    MCH 31.1 (*)     MCHC 36.1 (*)     Platelets 135 (*)     All other components within normal limits   COMPREHENSIVE METABOLIC PANEL - Abnormal; Notable for the following:     Albumin 3.4 (*)     AST 43 (*)     Anion Gap 7 (*)     All other components within normal limits   TROPONIN I - Abnormal; Notable for the following:     Troponin I 0.035 (*)     All other components within normal limits   B-TYPE NATRIURETIC PEPTIDE - Abnormal; Notable for the following:      (*)     All other components within normal limits   TROPONIN I - Abnormal; Notable for the following:     Troponin I 0.034 (*)     All other components within normal limits   TROPONIN I        All Lab Results:  Results for orders placed or performed during the hospital encounter of 11/25/17   CBC auto differential   Result Value Ref Range    WBC 4.11 3.90 - 12.70 K/uL    RBC 4.92 4.60 - 6.20 M/uL    Hemoglobin 15.3 14.0 - 18.0 g/dL    Hematocrit 42.4 40.0 - 54.0 %    MCV 86 82 - 98 fL    MCH 31.1 (H) 27.0 - 31.0 pg    MCHC 36.1 (H) 32.0 - 36.0 g/dL    RDW 12.7 11.5 - 14.5 %    Platelets 135 (L) 150 - 350 K/uL    MPV 10.8 9.2 - 12.9 fL    Gran # 1.9 1.8 - 7.7 K/uL    Lymph # 1.7 1.0 - 4.8 K/uL    Mono # 0.4 0.3 - 1.0 K/uL    Eos # 0.2 0.0 - 0.5 K/uL    Baso # 0.02 0.00 - 0.20 K/uL    Gran% 45.2 38.0 - 73.0 %    Lymph% 40.4 18.0 - 48.0 %    Mono% 9.5 4.0 - 15.0 %    Eosinophil% 4.4 0.0 - 8.0 %    Basophil% 0.5 0.0 - 1.9 %    Differential Method Automated    Comprehensive metabolic panel   Result Value Ref Range    Sodium 139 136 - 145 mmol/L    Potassium 3.9 3.5 - 5.1 mmol/L    Chloride 107 95 - 110 mmol/L    CO2 25 23 - 29 mmol/L     Glucose 99 70 - 110 mg/dL    BUN, Bld 20 8 - 23 mg/dL    Creatinine 0.9 0.5 - 1.4 mg/dL    Calcium 9.0 8.7 - 10.5 mg/dL    Total Protein 7.4 6.0 - 8.4 g/dL    Albumin 3.4 (L) 3.5 - 5.2 g/dL    Total Bilirubin 0.6 0.1 - 1.0 mg/dL    Alkaline Phosphatase 79 55 - 135 U/L    AST 43 (H) 10 - 40 U/L    ALT 39 10 - 44 U/L    Anion Gap 7 (L) 8 - 16 mmol/L    eGFR if African American >60 >60 mL/min/1.73 m^2    eGFR if non African American >60 >60 mL/min/1.73 m^2   Troponin I #1   Result Value Ref Range    Troponin I 0.035 (H) 0.000 - 0.026 ng/mL   B-Type natriuretic peptide (BNP)   Result Value Ref Range     (H) 0 - 99 pg/mL   Troponin I   Result Value Ref Range    Troponin I 0.034 (H) 0.000 - 0.026 ng/mL       Imaging Results:  Imaging Results          MRI Brain W WO Contrast (Final result)  Result time 11/25/17 16:35:16    Final result by Jordi Alaniz MD (11/25/17 16:35:16)                 Impression:        Mild atrophy.  No acute findings.      Electronically signed by: JORDI ALANIZ MD  Date:     11/25/17  Time:    16:35              Narrative:    MRI BRAIN W WO CONTRAST,    Date: 11/25/17 15:47:17    History:  TIA., Rule out CVA, complains L sided weakness    Technique:  Standard multiplanar pre and post contrast sequences of the brain performed with 6 cc Gadavist.    Comparison: None.    Findings:  The ventricles are mildly enlarged.  No definite edema, hemorrhage or mass effect is seen. No extra-axial fluid collection.     Skull base appears normal.    No abnormal enhancement.    Diffusion sequence is normal.                             US Carotid Bilateral (Final result)  Result time 11/25/17 15:31:01    Final result by Juancarlos Neves Jr., MD (11/25/17 15:31:01)                 Impression:     No hemodynamically significant stenosis.    Stenosis of 0 % - validated velocity measurements with angiographic measurements, velocity criteria are extrapolated from diameter data as defined by the  Society of Radiologists in Ultrasound Consensus Conference Radiology 2003; 229;340-346.      Electronically signed by: AMBER NEVES  Date:     11/25/17  Time:    15:31              Narrative:    Carotid ultrasound    History:    Dizziness.  Lightheadedness.    Findings:     Sonographic evaluation of the carotid systems was performed.     No significant atherosclerotic plaque is appreciative in either carotid system.  Minimal plaque in both carotid bulbs.  Intimal hyperplasia within the bilateral common carotid arteries.    The peak systolic velocity in the right internal carotid artery was approximately 119 cm/sec.  Normal diastolic velocities.  Right ICA/CCA is 1.3    The peak systolic velocity in the left internal carotid artery was [id number] cm/sec.  Normal diastolic velocities.  Left ICA/CCA is 1.0.    Antegrade flow noted in both vertebral arteries.                             CT Head Without Contrast (Final result)  Result time 11/25/17 13:00:47    Final result by Amber Neves Jr., MD (11/25/17 13:00:47)                 Impression:          No acute intracranial process    All CT scan at this facility use dose modulation, iterative reconstruction, and/or weight base dosing when appropriate to reduce radiation dose to as low as reasonably achievable.      Electronically signed by: AMBER NEVES  Date:     11/25/17  Time:    13:00              Narrative:    Reason: < >    Technique: Head CT without IV contrast.    Comparisons: <None.>    Findings:      Atrophy with periventricular white matter changes consistent with small vessel ischemic change.  No extra-axial acute fluid collection.  Normal gray-white differentiation without hemorrhage, mass effect, or midline shift. Ventricles and cisterns are within normal limits. No evidence of cerebral or cerebellar abnormalities.  Paranasal sinuses and mastoid air cells are clear.                             X-Ray Chest AP Portable (Final result)   Result time 11/25/17 12:42:47    Final result by Andi Griffin MD (11/25/17 12:42:47)                 Impression:          1.  Negative for acute process involving the chest.  2.  Incidental findings as noted above.      Electronically signed by: ANDI GRIFFIN MD  Date:     11/25/17  Time:    12:42              Narrative:    Portable Chest x-ray    Clinical Indication: Chest Pain.       Findings:     No comparison studies are available.  EKG leads overlie the chest which is lordotic in position.  The lungs are clear. The cardiac silhouette size is enlarged. The trachea is midline and the mediastinal width is normal. Negative for focal infiltrate, effusion or pneumothorax. Pulmonary vasculature is normal. Negative for osseous abnormalities. There are calcifications of the aortic knob and tortuosity of the descending thoracic aorta. There are degenerative changes of spine and both shoulder girdles.                             The EKG was ordered, reviewed, and independently interpreted by the ED provider.  Interpretation time: 1229  Rate: 59 BPM  Rhythm: Undetermined rhythm  Interpretation: Inverted T waves anterolaterally. No STEMI.    The EKG was ordered, reviewed, and independently interpreted by the ED provider.  Interpretation time: 1527  Rate: 63 BPM  Rhythm: Sinus rhythm with sinus arrhythmia with 1st degree AV block  Interpretation: L ventricular hypertrophy with repolarization abnormality. Prolonged QT. No STEMI.           The Emergency Provider reviewed the vital signs and test results, which are outlined above.    ED Discussion     1:23 PM: Discussed case with Candida Robertson NP (Hospital Medicine). Dr. Tariq agrees with current care and management of pt and accepts admission.   Admitting Service: Hospital medicine   Admitting Physician: Dr. Tariq  Admit to: In-patient tele    1:34 PM: Re-evaluated pt. I have discussed test results, shared treatment plan, and the need for admission with patient  and family at bedside. Pt and family express understanding at this time and agree with all information. All questions answered. Pt and family have no further questions or concerns at this time. Pt is ready for admit.      ED Medication(s):  Medications   fenofibrate tablet 48 mg (48 mg Oral Given 11/25/17 1715)   acetaminophen tablet 650 mg (not administered)   gadobutrol 6 mL (6 mLs Intravenous Given 11/25/17 1610)       New Prescriptions    No medications on file             Medical Decision Making    Medical Decision Making:   Clinical Tests:   Lab Tests: Ordered and Reviewed  Radiological Study: Ordered and Reviewed  Medical Tests: Ordered and Reviewed           Scribe Attestation:   Scribe #1: I performed the above scribed service and the documentation accurately describes the services I performed. I attest to the accuracy of the note.    Attending:   Physician Attestation Statement for Scribe #1: I, Khoa Tucker Jr., MD, personally performed the services described in this documentation, as scribed by Corinne Mack, in my presence, and it is both accurate and complete.          Clinical Impression       ICD-10-CM ICD-9-CM   1. Cerebrovascular accident (CVA), unspecified mechanism I63.9 434.91   2. Chest pain R07.9 786.50   3. Weakness R53.1 780.79   4. Dizziness R42 780.4       Disposition:   Disposition: Admitted  Condition: Fair         Khoa Tucker Jr., MD  11/25/17 2006

## 2017-11-25 NOTE — HPI
Long Albarran is a 87 y.o. male patient with patient medical history of hypertriglyceridemia and arthritis. who presented to the Emergency Department for complaint of dizziness with onset gradually yesterday, but has had these symptoms intermittently for about a year, worse over the last 6 months.  Pt states he is having trouble with his balance, but he denies any unilateral weakness. His daughter believes he may have some weakness to his LLE but he denies this. His other daughter, who lives in [place] reports that he has had dizziness with standing for about a year and has been worse in the last few months. He reports when these episodes happen, the room will be spinning. He is unsure if episodes are exacerbated by turning head side to side or in bed.  No mitigating or exacerbating factors reported. Patient denies any fever, chills, CP, SOB, N/V/D, back pain, neck pain, HA, numbness, facial asymmetry, dysphagia, dysarthria and all other sxs at this time. No prior Tx reported. No further complaints or concerns at this time.     In the ED VSS with mild sinus bradycardia, troponin mild elevation to 0.035 but no complaint of chest pain,  and other labs non-concerning. CT head without acute changes.    Admitted to hospital medicine overnight for observation and work up for dizziness.

## 2017-11-25 NOTE — SUBJECTIVE & OBJECTIVE
No past medical history on file.    Past Surgical History:   Procedure Laterality Date    ABDOMINAL SURGERY         Review of patient's allergies indicates:  No Known Allergies    No current facility-administered medications on file prior to encounter.      Current Outpatient Prescriptions on File Prior to Encounter   Medication Sig    diclofenac sodium 1 % Gel Apply 2 g topically 4 (four) times daily.    fenofibrate (TRICOR) 54 MG tablet Take 1 tablet (54 mg total) by mouth once daily.    gabapentin (NEURONTIN) 100 MG capsule Take 1 capsule (100 mg total) by mouth 3 (three) times daily.    ibuprofen (ADVIL,MOTRIN) 800 MG tablet Take 1 tablet (800 mg total) by mouth 3 (three) times daily.     Family History     None        Social History Main Topics    Smoking status: Former Smoker    Smokeless tobacco: Never Used    Alcohol use No    Drug use: No    Sexual activity: Not on file     Review of Systems   Constitutional: Negative for chills, diaphoresis, fatigue and fever.   HENT: Negative for congestion, rhinorrhea, sneezing and sore throat.    Eyes: Negative for visual disturbance.   Respiratory: Negative for cough, chest tightness, shortness of breath and wheezing.    Cardiovascular: Negative for chest pain, palpitations and leg swelling.   Gastrointestinal: Negative for abdominal pain, blood in stool, constipation, diarrhea, nausea and vomiting.   Endocrine: Negative for cold intolerance and heat intolerance.   Genitourinary: Negative for difficulty urinating, dysuria, flank pain, frequency and urgency.   Musculoskeletal: Positive for arthralgias. Negative for back pain, gait problem, myalgias, neck pain and neck stiffness.        Chronic arthralgias   Skin: Negative for pallor, rash and wound.   Allergic/Immunologic: Negative for immunocompromised state.   Neurological: Positive for dizziness and light-headedness. Negative for seizures, syncope, speech difficulty, weakness, numbness and headaches.    Hematological: Negative for adenopathy.   Psychiatric/Behavioral: Negative for confusion and decreased concentration.   All other systems reviewed and are negative.    Objective:     Vital Signs (Most Recent):  Temp: 98 °F (36.7 °C) (11/25/17 1138)  Pulse: 60 (11/25/17 1657)  Resp: 19 (11/25/17 1657)  BP: 137/66 (11/25/17 1657)  SpO2: 98 % (11/25/17 1657) Vital Signs (24h Range):  Temp:  [98 °F (36.7 °C)] 98 °F (36.7 °C)  Pulse:  [49-68] 60  Resp:  [16-19] 19  SpO2:  [95 %-98 %] 98 %  BP: (115-146)/() 137/66     Weight: 61.2 kg (135 lb)  Body mass index is 26.37 kg/m².    Physical Exam     Significant Labs:   BMP:   Recent Labs  Lab 11/25/17  1231   GLU 99      K 3.9      CO2 25   BUN 20   CREATININE 0.9   CALCIUM 9.0     CBC:   Recent Labs  Lab 11/25/17  1231   WBC 4.11   HGB 15.3   HCT 42.4   *     All pertinent labs within the past 24 hours have been reviewed.    Significant Imaging: I have reviewed and interpreted all pertinent imaging results/findings within the past 24 hours.     Imaging Results          MRI Brain W WO Contrast (Final result)  Result time 11/25/17 16:35:16    Final result by Jordi Alaniz MD (11/25/17 16:35:16)                 Impression:        Mild atrophy.  No acute findings.      Electronically signed by: JORDI ALANIZ MD  Date:     11/25/17  Time:    16:35              Narrative:    MRI BRAIN W WO CONTRAST,    Date: 11/25/17 15:47:17    History:  TIA., Rule out CVA, complains L sided weakness    Technique:  Standard multiplanar pre and post contrast sequences of the brain performed with 6 cc Gadavist.    Comparison: None.    Findings:  The ventricles are mildly enlarged.  No definite edema, hemorrhage or mass effect is seen. No extra-axial fluid collection.     Skull base appears normal.    No abnormal enhancement.    Diffusion sequence is normal.                             US Carotid Bilateral (Final result)  Result time 11/25/17 15:31:01    Final  result by Amber Neves Jr., MD (11/25/17 15:31:01)                 Impression:     No hemodynamically significant stenosis.    Stenosis of 0 % - validated velocity measurements with angiographic measurements, velocity criteria are extrapolated from diameter data as defined by the Society of Radiologists in Ultrasound Consensus Conference Radiology 2003; 229;340-346.      Electronically signed by: AMBER NEVES  Date:     11/25/17  Time:    15:31              Narrative:    Carotid ultrasound    History:    Dizziness.  Lightheadedness.    Findings:     Sonographic evaluation of the carotid systems was performed.     No significant atherosclerotic plaque is appreciative in either carotid system.  Minimal plaque in both carotid bulbs.  Intimal hyperplasia within the bilateral common carotid arteries.    The peak systolic velocity in the right internal carotid artery was approximately 119 cm/sec.  Normal diastolic velocities.  Right ICA/CCA is 1.3    The peak systolic velocity in the left internal carotid artery was [id number] cm/sec.  Normal diastolic velocities.  Left ICA/CCA is 1.0.    Antegrade flow noted in both vertebral arteries.                             CT Head Without Contrast (Final result)  Result time 11/25/17 13:00:47    Final result by Amber Neves Jr., MD (11/25/17 13:00:47)                 Impression:          No acute intracranial process    All CT scan at this facility use dose modulation, iterative reconstruction, and/or weight base dosing when appropriate to reduce radiation dose to as low as reasonably achievable.      Electronically signed by: AMBER NEVES  Date:     11/25/17  Time:    13:00              Narrative:    Reason: < >    Technique: Head CT without IV contrast.    Comparisons: <None.>    Findings:      Atrophy with periventricular white matter changes consistent with small vessel ischemic change.  No extra-axial acute fluid collection.  Normal gray-white  differentiation without hemorrhage, mass effect, or midline shift. Ventricles and cisterns are within normal limits. No evidence of cerebral or cerebellar abnormalities.  Paranasal sinuses and mastoid air cells are clear.                             X-Ray Chest AP Portable (Final result)  Result time 11/25/17 12:42:47    Final result by Andi Griffin MD (11/25/17 12:42:47)                 Impression:          1.  Negative for acute process involving the chest.  2.  Incidental findings as noted above.      Electronically signed by: ANDI GRIFFIN MD  Date:     11/25/17  Time:    12:42              Narrative:    Portable Chest x-ray    Clinical Indication: Chest Pain.       Findings:     No comparison studies are available.  EKG leads overlie the chest which is lordotic in position.  The lungs are clear. The cardiac silhouette size is enlarged. The trachea is midline and the mediastinal width is normal. Negative for focal infiltrate, effusion or pneumothorax. Pulmonary vasculature is normal. Negative for osseous abnormalities. There are calcifications of the aortic knob and tortuosity of the descending thoracic aorta. There are degenerative changes of spine and both shoulder girdles.

## 2017-11-26 VITALS
WEIGHT: 135.56 LBS | TEMPERATURE: 99 F | RESPIRATION RATE: 18 BRPM | HEART RATE: 65 BPM | BODY MASS INDEX: 26.61 KG/M2 | DIASTOLIC BLOOD PRESSURE: 63 MMHG | SYSTOLIC BLOOD PRESSURE: 135 MMHG | HEIGHT: 60 IN | OXYGEN SATURATION: 98 %

## 2017-11-26 LAB
DIASTOLIC DYSFUNCTION: YES
ESTIMATED PA SYSTOLIC PRESSURE: 18.84
MITRAL VALVE MOBILITY: NORMAL
RETIRED EF AND QEF - SEE NOTES: 60 (ref 55–65)
TRICUSPID VALVE REGURGITATION: ABNORMAL

## 2017-11-26 PROCEDURE — 97162 PT EVAL MOD COMPLEX 30 MIN: CPT

## 2017-11-26 PROCEDURE — G8978 MOBILITY CURRENT STATUS: HCPCS | Mod: CK

## 2017-11-26 PROCEDURE — G0378 HOSPITAL OBSERVATION PER HR: HCPCS

## 2017-11-26 PROCEDURE — C8929 TTE W OR WO FOL WCON,DOPPLER: HCPCS

## 2017-11-26 PROCEDURE — G8979 MOBILITY GOAL STATUS: HCPCS | Mod: CJ

## 2017-11-26 PROCEDURE — 25000003 PHARM REV CODE 250

## 2017-11-26 PROCEDURE — 93306 TTE W/DOPPLER COMPLETE: CPT | Mod: 26,,, | Performed by: INTERNAL MEDICINE

## 2017-11-26 RX ADMIN — FENOFIBRATE 48 MG: 48 TABLET ORAL at 09:11

## 2017-11-26 NOTE — HOSPITAL COURSE
11/26/2017 - Patient did well overnight. Still has intermittent dizziness when sitting or standing. Orthostatics negative. Was able to work with Pt, but required walker for steady gait. 5/5 strength b/l. Broad differential. Appears to be a chronic problem as daughter reports this has occurred intermittently for 6 months - a year. Near syncope, no syncopal event. No weakness  EKG with LVH and first degree AV block. Mild troponin elevation, but troponins remained stable. US carotids negative. CT/MRI brain negative. TTE results as follows:    1 - Normal left ventricular systolic function (EF 60-65%).     2 - Impaired LV relaxation, normal LAP (grade 1 diastolic dysfunction).     3 - Normal right ventricular systolic function .     4 - The estimated PA systolic pressure is 19 mmHg.     5 - Trivial tricuspid regurgitation.     6 - Eccentric hypertrophy.     7 - Wall motion abnormalities as detailed below:    Left Ventricle: The left ventricle is normal in size, with an end-diastolic diameter of 5.4 cm, and an end-systolic diameter of 3.4 cm. LV wall thickness is normal, with the septum measuring 1.0 cm and the posterior wall measuring 0.9 cm across. Relative   wall thickness was normal at 0.33, and the LV mass index was increased at 143.8 g/m2 consistent with eccentric left ventricular hypertrophy. The lateral wall is severely hypokinetic. The following segments were mildly hypokinetic: mid anterior wall,   basal anterior wall. Mid and basal anterolateral wall is hypokinetic, apical anterolateral wall moves well.   Left ventricular systolic function appears normal. Visually estimated ejection fraction is 60-65%. The LV Doppler derived stroke volume equals 55.0 ccs.   Cardiac monitor overnight without arrhythmia. Spoke with Cardiologist Dr. Olayinka Saldaña about the patient's status and TTE findings. He suggests close follow up with PCP and stress testing. The results and plan were communicated to the patient and patient's  son.  May also be BPPV and recommend PCP test for this as well in the office. Patient seen and examined on the day of discharge and is stable for discharge.

## 2017-11-26 NOTE — NURSING
Pt admitted to room 215, via stretcher from ED in no apparent distress. Assisted from stretcher to bed without issue.  Bedside report given by Freddy ED RN; no further questions at this time.  Oriented pt and son to room, bed, call light.  Tele monitor placed with rhythm verification by tele monitor tech.  Assessment and vitals documented.  Son verbalizes that he, or another family member with be in room with pt at all times.  Bed in low position, side rails up x2, call light in reach.

## 2017-11-26 NOTE — PLAN OF CARE
Problem: Patient Care Overview  Goal: Plan of Care Review  Outcome: Ongoing (interventions implemented as appropriate)  patient had a restful night, no complaints of dizziness, ambulated to restroom with minimal assistance; remains SR/SB on monitor, family and patient updated on plan of care. Will continue to monitor

## 2017-11-26 NOTE — PLAN OF CARE
Problem: Patient Care Overview  Goal: Plan of Care Review  Patient currently is modified indep with bed mobility and CGA with transfers and ambulation with RW (approx 50 feet).     Outcome: Outcome(s) achieved Date Met: 11/26/17  remqains injury free. Denies c/o pain./ stable condition. Discharging home

## 2017-11-26 NOTE — ASSESSMENT & PLAN NOTE
Broad differential  Appears to be a chronic problem as daughter reports this has occurred intermittently for 6 months - a year   Near syncope, no syncopal event  No weakness  EKG with LVH and first degree AV block  Mild troponin elevation, will trend trop/EKG  Orthostatics negative  US carotids negative  CT/MRI brain negative  TTE in am  Cardiac monitor overnight  May be BPPV. If all else ruled out, scop patch and discharge in am with follow up PCP

## 2017-11-26 NOTE — H&P
[facility] Hospital Medicine  History & Physical    Patient Name: Long Albarran  MRN: [id number]  Admission Date: 11/25/2017  Attending Physician: Liz Tariq MD   Primary Care Provider: Penny Watson DO         Patient information was obtained from patient, relative(s), EMS personnel and ER records.     Subjective:     Principal Problem:Dizziness of unknown cause    Chief Complaint:   Chief Complaint   Patient presents with    Extremity Weakness    Dizziness        HPI: Long Albarran is a 87 y.o. male patient  with patient medical history of hypertriglyceridemia and arthritis. who presented to the Emergency Department for complaint of dizziness with onset gradually yesterday, but has had these symptoms intermittently for about a year, worse over the last 6 months.  Pt states he is having trouble with his balance, but he denies any unilateral weakness. His daughter believes he may have some weakness to his LLE but he denies this. His other daughter, who lives in [place] reports that he has had dizziness with standing for about a year and has been worse in the last few months. He reports when these episodes happen, the room will be spinning. He is unsure if episodes are exacerbated by turning head side to side or in bed.  No mitigating or exacerbating factors reported. Patient denies any fever, chills, CP, SOB, N/V/D, back pain, neck pain, HA, numbness, facial asymmetry, dysphagia, dysarthria and all other sxs at this time. No prior Tx reported. No further complaints or concerns at this time.     In the ED VSS with mild sinus bradycardia, troponin mild elevation to 0.035 but no complaint of chest pain,  and other labs non-concerning. CT head without acute changes.    Admitted to hospital medicine overnight for observation and work up for dizziness.    No new subjective & objective note has been filed under this hospital service since the last note was  generated.    Assessment/Plan:     * Dizziness of unknown cause    Broad differential  Appears to be a chronic problem as daughter reports this has occurred intermittently for 6 months - a year   Near syncope, no syncopal event  No weakness  EKG with LVH and first degree AV block  Mild troponin elevation, will trend trop/EKG  Orthostatics negative  US carotids negative  CT/MRI brain negative  TTE in am  Cardiac monitor overnight  May be BPPV. If all else ruled out, scop patch and discharge in am with follow up PCP            Hypertriglyceridemia without hypercholesterolemia    Continue home fenofibrate            VTE Risk Mitigation         Ordered     Medium Risk of VTE  Once      11/25/17 2047     Place sequential compression device  Until discontinued      11/25/17 2047             Liz Tariq MD  Department of Hospital Medicine   [facility]

## 2017-11-26 NOTE — H&P
[facility] Hospital Medicine  History & Physical    Patient Name: Long Albarran  MRN: [id number]  Admission Date: 11/25/2017  Attending Physician: Liz Tariq MD   Primary Care Provider: Penny Watson DO         Patient information was obtained from patient, relative(s) and ER records.     Subjective:     Principal Problem:Dizziness of unknown cause    Chief Complaint:   Chief Complaint   Patient presents with    Extremity Weakness    Dizziness        HPI: Long Albarran is a 87 y.o. male patient  with patient medical history of hypertriglyceridemia and arthritis. who presented to the Emergency Department for complaint of dizziness with onset gradually yesterday, but has had these symptoms intermittently for about a year, worse over the last 6 months.  Pt states he is having trouble with his balance, but he denies any unilateral weakness. His daughter believes he may have some weakness to his LLE but he denies this. His other daughter, who lives in [place] reports that he has had dizziness with standing for about a year and has been worse in the last few months. He reports when these episodes happen, the room will be spinning. He is unsure if episodes are exacerbated by turning head side to side or in bed.  No mitigating or exacerbating factors reported. Patient denies any fever, chills, CP, SOB, N/V/D, back pain, neck pain, HA, numbness, facial asymmetry, dysphagia, dysarthria and all other sxs at this time. No prior Tx reported. No further complaints or concerns at this time.     In the ED VSS with mild sinus bradycardia, troponin mild elevation to 0.035 but no complaint of chest pain,  and other labs non-concerning. CT head without acute changes.    Admitted to hospital medicine overnight for observation and work up for dizziness.    No past medical history on file.    Past Surgical History:   Procedure Laterality Date    ABDOMINAL SURGERY         Review of patient's allergies  indicates:  No Known Allergies    No current facility-administered medications on file prior to encounter.      Current Outpatient Prescriptions on File Prior to Encounter   Medication Sig    diclofenac sodium 1 % Gel Apply 2 g topically 4 (four) times daily.    fenofibrate (TRICOR) 54 MG tablet Take 1 tablet (54 mg total) by mouth once daily.    gabapentin (NEURONTIN) 100 MG capsule Take 1 capsule (100 mg total) by mouth 3 (three) times daily.    ibuprofen (ADVIL,MOTRIN) 800 MG tablet Take 1 tablet (800 mg total) by mouth 3 (three) times daily.     Family History     None        Social History Main Topics    Smoking status: Former Smoker    Smokeless tobacco: Never Used    Alcohol use No    Drug use: No    Sexual activity: Not on file     Review of Systems   Constitutional: Negative for chills, diaphoresis, fatigue and fever.   HENT: Negative for congestion, rhinorrhea, sneezing and sore throat.    Eyes: Negative for visual disturbance.   Respiratory: Negative for cough, chest tightness, shortness of breath and wheezing.    Cardiovascular: Negative for chest pain, palpitations and leg swelling.   Gastrointestinal: Negative for abdominal pain, blood in stool, constipation, diarrhea, nausea and vomiting.   Endocrine: Negative for cold intolerance and heat intolerance.   Genitourinary: Negative for difficulty urinating, dysuria, flank pain, frequency and urgency.   Musculoskeletal: Positive for arthralgias. Negative for back pain, gait problem, myalgias, neck pain and neck stiffness.        Chronic arthralgias   Skin: Negative for pallor, rash and wound.   Allergic/Immunologic: Negative for immunocompromised state.   Neurological: Positive for dizziness and light-headedness. Negative for seizures, syncope, speech difficulty, weakness, numbness and headaches.   Hematological: Negative for adenopathy.   Psychiatric/Behavioral: Negative for confusion and decreased concentration.   All other systems reviewed and  are negative.    Objective:     Vital Signs (Most Recent):  Temp: 98 °F (36.7 °C) (11/25/17 1138)  Pulse: 60 (11/25/17 1657)  Resp: 19 (11/25/17 1657)  BP: 137/66 (11/25/17 1657)  SpO2: 98 % (11/25/17 1657) Vital Signs (24h Range):  Temp:  [98 °F (36.7 °C)] 98 °F (36.7 °C)  Pulse:  [49-68] 60  Resp:  [16-19] 19  SpO2:  [95 %-98 %] 98 %  BP: (115-146)/() 137/66     Weight: 61.2 kg (135 lb)  Body mass index is 26.37 kg/m².    Physical Exam     Significant Labs:   BMP:   Recent Labs  Lab 11/25/17  1231   GLU 99      K 3.9      CO2 25   BUN 20   CREATININE 0.9   CALCIUM 9.0     CBC:   Recent Labs  Lab 11/25/17  1231   WBC 4.11   HGB 15.3   HCT 42.4   *     All pertinent labs within the past 24 hours have been reviewed.    Significant Imaging: I have reviewed and interpreted all pertinent imaging results/findings within the past 24 hours.     Imaging Results          MRI Brain W WO Contrast (Final result)  Result time 11/25/17 16:35:16    Final result by Jordi Alaniz MD (11/25/17 16:35:16)                 Impression:        Mild atrophy.  No acute findings.      Electronically signed by: JORDI ALANIZ MD  Date:     11/25/17  Time:    16:35              Narrative:    MRI BRAIN W WO CONTRAST,    Date: 11/25/17 15:47:17    History:  TIA., Rule out CVA, complains L sided weakness    Technique:  Standard multiplanar pre and post contrast sequences of the brain performed with 6 cc Gadavist.    Comparison: None.    Findings:  The ventricles are mildly enlarged.  No definite edema, hemorrhage or mass effect is seen. No extra-axial fluid collection.     Skull base appears normal.    No abnormal enhancement.    Diffusion sequence is normal.                             US Carotid Bilateral (Final result)  Result time 11/25/17 15:31:01    Final result by Juancarlos Neves Jr., MD (11/25/17 15:31:01)                 Impression:     No hemodynamically significant stenosis.    Stenosis of 0 % -  validated velocity measurements with angiographic measurements, velocity criteria are extrapolated from diameter data as defined by the Society of Radiologists in Ultrasound Consensus Conference Radiology 2003; 229;340-346.      Electronically signed by: AMBER NEVES  Date:     11/25/17  Time:    15:31              Narrative:    Carotid ultrasound    History:    Dizziness.  Lightheadedness.    Findings:     Sonographic evaluation of the carotid systems was performed.     No significant atherosclerotic plaque is appreciative in either carotid system.  Minimal plaque in both carotid bulbs.  Intimal hyperplasia within the bilateral common carotid arteries.    The peak systolic velocity in the right internal carotid artery was approximately 119 cm/sec.  Normal diastolic velocities.  Right ICA/CCA is 1.3    The peak systolic velocity in the left internal carotid artery was [id number] cm/sec.  Normal diastolic velocities.  Left ICA/CCA is 1.0.    Antegrade flow noted in both vertebral arteries.                             CT Head Without Contrast (Final result)  Result time 11/25/17 13:00:47    Final result by Amber Neves Jr., MD (11/25/17 13:00:47)                 Impression:          No acute intracranial process    All CT scan at this facility use dose modulation, iterative reconstruction, and/or weight base dosing when appropriate to reduce radiation dose to as low as reasonably achievable.      Electronically signed by: AMBER NEVES  Date:     11/25/17  Time:    13:00              Narrative:    Reason: < >    Technique: Head CT without IV contrast.    Comparisons: <None.>    Findings:      Atrophy with periventricular white matter changes consistent with small vessel ischemic change.  No extra-axial acute fluid collection.  Normal gray-white differentiation without hemorrhage, mass effect, or midline shift. Ventricles and cisterns are within normal limits. No evidence of cerebral or cerebellar  abnormalities.  Paranasal sinuses and mastoid air cells are clear.                             X-Ray Chest AP Portable (Final result)  Result time 11/25/17 12:42:47    Final result by Andi Griffin MD (11/25/17 12:42:47)                 Impression:          1.  Negative for acute process involving the chest.  2.  Incidental findings as noted above.      Electronically signed by: ANDI GRIFFIN MD  Date:     11/25/17  Time:    12:42              Narrative:    Portable Chest x-ray    Clinical Indication: Chest Pain.       Findings:     No comparison studies are available.  EKG leads overlie the chest which is lordotic in position.  The lungs are clear. The cardiac silhouette size is enlarged. The trachea is midline and the mediastinal width is normal. Negative for focal infiltrate, effusion or pneumothorax. Pulmonary vasculature is normal. Negative for osseous abnormalities. There are calcifications of the aortic knob and tortuosity of the descending thoracic aorta. There are degenerative changes of spine and both shoulder girdles.                                Assessment/Plan:     Dizziness of unknown cause    Broad differential  Appears to be a chronic problem as daughter reports this has occurred intermittently for 6 months - a year   Near syncope, no syncopal event  No weakness  EKG with LVH and first degree AV block  Mild troponin elevation, will trend trop/EKG  Orthostatics negative  US carotids negative  CT/MRI brain negative  TTE in am  Cardiac monitor overnight  May be BPPV. If all else ruled out, scop patch and discharge in am with follow up PCP              VTE Risk Mitigation     None             Liz Tariq MD  Department of Hospital Medicine   [facility]

## 2017-11-26 NOTE — PT/OT/SLP EVAL
Physical Therapy  Evaluation    Long Roth   MRN: [id number]   Admitting Diagnosis: Dizziness of unknown cause    PT Received On: 11/26/17  PT Start Time: 0943     PT Stop Time: 1002    PT Total Time (min): 19 min       Billable Minutes:  Evaluation 19 mins    Diagnosis: Dizziness of unknown cause  Rehab dx: Balance deficits    History reviewed. No pertinent past medical history.   Past Surgical History:   Procedure Laterality Date    ABDOMINAL SURGERY         Referring physician: Dr. Tariq  Date referred to PT: 12/25/17 @ 1441    General Precautions: Standard, fall  Orthopedic Precautions: N/A   Braces: N/A            Patient History:  Lives With: child(cyrus), adult  Living Arrangements: house  Home Accessibility: other (see comments) (no stairs; no concerns)  Living Environment Comment: Patient was functionally indep PTA, though daughter reported that he was unsteady and would grab on to walls and furniture when walking in house.  Equipment Currently Used at Home: none    Previous Level of Function:  Ambulation Skills: other (see comments) (Daughter stated that patient had poor balance and would grab walls and furniture around house when ambulating)  Transfer Skills: independent  ADL Skills: independent    Subjective:  Communicated with EPIC and nurse (Vicky) prior to session.  Patient agreeable to participating in PT eval.  Chief Complaint: Impaired balance, recent dizzy spells  Patient goals: To go home    Pain/Comfort  Pain Rating 1: 0/10      Objective:   Patient found with: peripheral IV, telemetry     Cognitive Exam:  Oriented to: Person, Place and Situation    Follows Commands/attention: Follows multistep  commands  Communication: clear/fluent  Safety awareness/insight to disability: intact    Physical Exam:  Postural examination/scapula alignment: Rounded shoulder and Head forward    Skin integrity: Visible skin intact  Edema: None noted BLE    Sensation:   Intact    Upper Extremity Range of  Motion:  Right Upper Extremity: WFL  Left Upper Extremity: WFL    Upper Extremity Strength:  Right Upper Extremity: WFL  Left Upper Extremity: WFL    Lower Extremity Range of Motion:  Right Lower Extremity: WFL  Left Lower Extremity: WFL    Lower Extremity Strength:  Right Lower Extremity: Functionally 4+/5  Left Lower Extremity: Functionally 4+/5     Fine motor coordination:  Intact    Gross motor coordination: WFL    Functional Mobility:  Bed Mobility:  Rolling/Turning to Left: Modified independent  Rolling/Turning Right: Modified independent  Scooting/Bridging: Modified Independent  Supine to Sit: Modified Independent  Sit to Supine: Modified Independent    Transfers:  Sit <> Stand Assistance: Contact Guard Assistance  Sit <> Stand Assistive Device: Rolling Walker  Bed <> Chair Technique: Stand Pivot  Bed <> Chair Assistance: Contact Guard Assistance  Bed <> Chair Assistive Device: Rolling Walker    Gait:   Gait Distance: Patient ambulated 40 feet with RW CGA x 2 trials, with standing rest break between trials.  Verbal cues for proper RW management (daughter served as ).  Assistance 1: Contact Guard Assistance  Gait Assistive Device: Rolling walker  Gait Pattern: swing-through gait  Gait Deviation(s): decreased stacy    Balance:   Static Sit: NORMAL: No deviations seen in posture held statically  Dynamic Sit: GOOD: Maintains balance through MODERATE excursions of active trunk movement  Static Stand: FAIR: Maintains without assist but unable to take challenges  Dynamic stand: FAIR: Needs CONTACT GUARD during gait (with RW)    Therapeutic Activities and Exercises:  Patient participated in transfer training and gait training with RW.  Needs continued education of proper RW use/management.    AM-PAC 6 CLICK MOBILITY  How much help from another person does this patient currently need?   1 = Unable, Total/Dependent Assistance  2 = A lot, Maximum/Moderate Assistance  3 = A little, Minimum/Contact  Guard/Supervision  4 = None, Modified [place]/Independent    Turning over in bed (including adjusting bedclothes, sheets and blankets)?: 4  Sitting down on and standing up from a chair with arms (e.g., wheelchair, bedside commode, etc.): 3  Moving from lying on back to sitting on the side of the bed?: 4  Moving to and from a bed to a chair (including a wheelchair)?: 3  Need to walk in hospital room?: 3  Climbing 3-5 steps with a railing?: 1  Total Score: 18     AM-PAC Raw Score CMS G-Code Modifier Level of Impairment Assistance   6 % Total / Unable   7 - 9 CM 80 - 100% Maximal Assist   10 - 14 CL 60 - 80% Moderate Assist   15 - 19 CK 40 - 60% Moderate Assist   20 - 22 CJ 20 - 40% Minimal Assist   23 CI 1-20% SBA / CGA   24 CH 0% Independent/ Mod I     Patient left seated on edge of bed (per patient request) with all lines intact, call button in reach and daughter present.    Assessment:   Long Roth is a 87 y.o. male with a medical diagnosis of Dizziness of unknown cause and presents with balance deficits.  Patient would benefit from skilled PT services in acute care setting to continue education/training on proper use of RW during transfers and ambulation.  Upon D/C with RW, patient and his daughter feel that he will not need further PT services.    Rehab identified problem list/impairments: Rehab identified problem list/impairments: weakness, impaired endurance, impaired functional mobilty, gait instability, impaired balance    Rehab potential is good.    Activity tolerance: Good    Discharge recommendations: Discharge Facility/Level Of Care Needs: home     Barriers to discharge: Barriers to Discharge: None    Equipment recommendations: Equipment Needed After Discharge: walker, rolling, shower chair     GOALS:    Physical Therapy Goals        Problem: Physical Therapy Goal    Goal Priority Disciplines Outcome Goal Variances Interventions   Physical Therapy Goal     PT/OT, PT      Description:   LTGs to be met within 7 days (12/3/17):  1.  Patient will perform functional transfers with RW with SPV  2.  Patient will ambulate 150' with RW with SPV with no verbal cues needed for proper RW management                    PLAN:    Patient to be seen  (Patient to be seen a minimum of 5 of 7 days a week as tolerated) to address the above listed problems via gait training, therapeutic activities, therapeutic exercises  Plan of Care expires: 12/03/17  Plan of Care reviewed with: patient, daughter          Rimma Sourav, PT, DPT  11/26/2017

## 2017-11-27 NOTE — DISCHARGE SUMMARY
[facility] Hospital Medicine  Discharge Summary      Patient Name: Long Albarran  MRN: [id number]  Admission Date: 11/25/2017  Hospital Length of Stay: 1 days  Discharge Date and Time:  11/26/2017 6:56 PM  Attending Physician: No att. providers found   Discharging Provider: Liz Tariq MD  Primary Care Provider: Penny Watson DO      HPI:   Long Albarran is a 87 y.o. male patient  with patient medical history of hypertriglyceridemia and arthritis. who presented to the Emergency Department for complaint of dizziness with onset gradually yesterday, but has had these symptoms intermittently for about a year, worse over the last 6 months.  Pt states he is having trouble with his balance, but he denies any unilateral weakness. His daughter believes he may have some weakness to his LLE but he denies this. His other daughter, who lives in [place] reports that he has had dizziness with standing for about a year and has been worse in the last few months. He reports when these episodes happen, the room will be spinning. He is unsure if episodes are exacerbated by turning head side to side or in bed.  No mitigating or exacerbating factors reported. Patient denies any fever, chills, CP, SOB, N/V/D, back pain, neck pain, HA, numbness, facial asymmetry, dysphagia, dysarthria and all other sxs at this time. No prior Tx reported. No further complaints or concerns at this time.     In the ED VSS with mild sinus bradycardia, troponin mild elevation to 0.035 but no complaint of chest pain,  and other labs non-concerning. CT head without acute changes.    Admitted to hospital medicine overnight for observation and work up for dizziness.    * No surgery found *      Hospital Course:   11/26/2017 - Patient did well overnight. Still has intermittent dizziness when sitting or standing. Orthostatics negative. Was able to work with Pt, but required walker for steady gait. 5/5 strength b/l. Broad  differential. Appears to be a chronic problem as daughter reports this has occurred intermittently for 6 months - a year. Near syncope, no syncopal event. No weakness  EKG with LVH and first degree AV block. Mild troponin elevation, but troponins remained stable. US carotids negative. CT/MRI brain negative. TTE results as follows:    1 - Normal left ventricular systolic function (EF 60-65%).     2 - Impaired LV relaxation, normal LAP (grade 1 diastolic dysfunction).     3 - Normal right ventricular systolic function .     4 - The estimated PA systolic pressure is 19 mmHg.     5 - Trivial tricuspid regurgitation.     6 - Eccentric hypertrophy.     7 - Wall motion abnormalities as detailed below:    Left Ventricle: The left ventricle is normal in size, with an end-diastolic diameter of 5.4 cm, and an end-systolic diameter of 3.4 cm. LV wall thickness is normal, with the septum measuring 1.0 cm and the posterior wall measuring 0.9 cm across. Relative   wall thickness was normal at 0.33, and the LV mass index was increased at 143.8 g/m2 consistent with eccentric left ventricular hypertrophy. The lateral wall is severely hypokinetic. The following segments were mildly hypokinetic: mid anterior wall,   basal anterior wall. Mid and basal anterolateral wall is hypokinetic, apical anterolateral wall moves well.   Left ventricular systolic function appears normal. Visually estimated ejection fraction is 60-65%. The LV Doppler derived stroke volume equals 55.0 ccs.   Cardiac monitor overnight without arrhythmia. Spoke with Cardiologist Dr. Olayinka Saldaña about the patient's status and TTE findings. He suggests close follow up with PCP and stress testing. The results and plan were communicated to the patient and patient's son.  May also be BPPV and recommend PCP test for this as well in the office. Patient seen and examined on the day of discharge and is stable for discharge.        Consults:     * Dizziness of unknown cause     Broad differential  Appears to be a chronic problem as daughter reports this has occurred intermittently for 6 months - a year   Near syncope, no syncopal event  No weakness  EKG with LVH and first degree AV block  Mild troponin elevation, will trend trop/EKG  Orthostatics negative  US carotids negative  CT/MRI brain negative  TTE in am  Cardiac monitor overnight  May be BPPV. If all else ruled out, scop patch and discharge in am with follow up PCP              Final Active Diagnoses:    Diagnosis Date Noted POA    PRINCIPAL PROBLEM:  Dizziness of unknown cause [R42] 11/25/2017 Yes    Hypertriglyceridemia without hypercholesterolemia [E78.1] 11/25/2017 Yes      Problems Resolved During this Admission:    Diagnosis Date Noted Date Resolved POA       Discharged Condition: good    Disposition: Home or Self Care    Follow Up:  Follow-up Information     Penny Watson DO. Schedule an appointment as soon as possible for a visit in 3 days.    Specialty:  Internal Medicine  Why:  For hospital follow up, order stress test  Contact information:  [address] [place] [address] DR Erik MCGILL [id number] [phone]                 Patient Instructions:     WALKER FOR HOME USE   Order Specific Question Answer Comments   Type of Walker: Rollator    With wheels? Yes    Height: 5' (1.524 m)    Weight: 61.5 kg (135 lb 9.3 oz)    Length of need (1-99 months): 99    Does patient have medical equipment at home? none    Please check all that apply: Patient is unable to safely ambulate without equipment.      BATH/SHOWER CHAIR FOR HOME USE   Order Specific Question Answer Comments   Height: 5' (1.524 m)    Weight: 61.5 kg (135 lb 9.3 oz)    Does patient have medical equipment at home? none    Length of need (1-99 months): 99    Type: With back      Diet general   Order Specific Question Answer Comments   Additional restrictions: Cardiac (Low Na/Chol)      Activity as tolerated     Call MD for:  temperature >100.4     Call MD for:  persistent  nausea and vomiting or diarrhea     Call MD for:  severe uncontrolled pain     Call MD for:  redness, tenderness, or signs of infection (pain, swelling, redness, odor or green/yellow discharge around incision site)     Call MD for:  difficulty breathing or increased cough         Significant Diagnostic Studies: Labs:   BMP:   Recent Labs  Lab 11/25/17  1231   GLU 99      K 3.9      CO2 25   BUN 20   CREATININE 0.9   CALCIUM 9.0   , CBC   Recent Labs  Lab 11/25/17  1231   WBC 4.11   HGB 15.3   HCT 42.4   *    and All labs within the past 24 hours have been reviewed    Pending Diagnostic Studies:     None         Medications:  Reconciled Home Medications:   Discharge Medication List as of 11/26/2017  6:19 PM      CONTINUE these medications which have NOT CHANGED    Details   diclofenac sodium 1 % Gel Apply 2 g topically 4 (four) times daily., Starting 1/24/2017, Until Fri 2/3/17, Normal      fenofibrate (TRICOR) 54 MG tablet Take 1 tablet (54 mg total) by mouth once daily., Starting Wed 8/2/2017, Until Thu 8/2/2018, Normal      gabapentin (NEURONTIN) 100 MG capsule Take 1 capsule (100 mg total) by mouth 3 (three) times daily., Starting Wed 7/19/2017, Until Thu 7/19/2018, Normal         STOP taking these medications       ibuprofen (ADVIL,MOTRIN) 800 MG tablet Comments:   Reason for Stopping:               Indwelling Lines/Drains at time of discharge:   Lines/Drains/Airways          No matching active lines, drains, or airways          Time spent on the discharge of patient: 60 minutes  Patient was seen and examined on the date of discharge and determined to be suitable for discharge.         Liz Tariq MD  Department of Hospital Medicine  [facility]

## 2017-11-27 NOTE — NURSING
Discharge instructions , written and verbal , given to patient's son. Verbalizes understanding. Discharged to home in stable condition.

## 2017-11-29 NOTE — PLAN OF CARE
On 11/29/17, [facility] bedside nurse Sandra stating that patient's family is on phone wanting to know where rollator and transfer tub bench are.  Reviewed orders and determined that there are orders for rollator and tranfser tub bench.  Informed [facility] bedside nurse that family will need to purchase transfer tub bench or shower chair for patient, as this item is not covered under insurance.  [facility] informed where ([facility], [facility]) can be obtained for patient.  Also, regarding rollator, printed out order and other patient information for Caity with [employer] (who is here at [facility]), with Caity stating that she will deliver rollator to patient's home tomorrow morning.  [facility] bedside nurse notified patient's family of above.

## 2017-12-11 ENCOUNTER — OFFICE VISIT (OUTPATIENT)
Dept: INTERNAL MEDICINE | Facility: CLINIC | Age: 82
End: 2017-12-11
Payer: MEDICARE

## 2017-12-11 VITALS
TEMPERATURE: 98 F | HEART RATE: 75 BPM | SYSTOLIC BLOOD PRESSURE: 144 MMHG | BODY MASS INDEX: 26.79 KG/M2 | HEIGHT: 60 IN | DIASTOLIC BLOOD PRESSURE: 70 MMHG | WEIGHT: 136.44 LBS | OXYGEN SATURATION: 95 %

## 2017-12-11 DIAGNOSIS — I51.89 DIASTOLIC DYSFUNCTION: ICD-10-CM

## 2017-12-11 DIAGNOSIS — Z23 IMMUNIZATION DUE: ICD-10-CM

## 2017-12-11 DIAGNOSIS — R42 DIZZINESS: Primary | ICD-10-CM

## 2017-12-11 PROCEDURE — 99214 OFFICE O/P EST MOD 30 MIN: CPT | Mod: PBBFAC | Performed by: INTERNAL MEDICINE

## 2017-12-11 PROCEDURE — G0008 ADMIN INFLUENZA VIRUS VAC: HCPCS | Mod: PBBFAC

## 2017-12-11 PROCEDURE — G0009 ADMIN PNEUMOCOCCAL VACCINE: HCPCS | Mod: PBBFAC

## 2017-12-11 PROCEDURE — 90670 PCV13 VACCINE IM: CPT | Mod: PBBFAC

## 2017-12-11 PROCEDURE — 99214 OFFICE O/P EST MOD 30 MIN: CPT | Mod: S$PBB,,, | Performed by: INTERNAL MEDICINE

## 2017-12-11 PROCEDURE — 99999 PR PBB SHADOW E&M-EST. PATIENT-LVL IV: CPT | Mod: PBBFAC,,, | Performed by: INTERNAL MEDICINE

## 2017-12-12 DIAGNOSIS — E78.1 HYPERTRIGLYCERIDEMIA: ICD-10-CM

## 2017-12-12 DIAGNOSIS — R10.31 RIGHT LOWER QUADRANT ABDOMINAL PAIN: ICD-10-CM

## 2017-12-12 NOTE — PROGRESS NOTES
Subjective:       Patient ID: Long Roth is a 87 y.o. male.    Chief Complaint: Hospital Follow Up    Long Roth  87 y.o. Unknown male    Patient presents with:  Hospital Follow Up    HPI: Presents to the clinic with his family member for a hospital follow up. He does not speak English. His family member translates for him.   He was hospitalized from 11/25 to 11/26 for dizziness. Labs and imaging were unremarkable. He continues to have dizziness primarily when he goes from lying or sitting to standing. He denies syncope. He denies chest pain or palpitations. EKG showed sinus bradycardia with a 1st degree AV block. Echocardiogram showed diastolic dysfunction. MRI of the brain was negative for an acute process. Carotid ultrasound did not show any significant stenoses.   He denies sinus complaints. He does have hearing loss. He denies ear pain.     Past Medical History:  Hypertriglyceridemia    Current Outpatient Prescriptions on File Prior to Visit:  fenofibrate (TRICOR) 54 MG tablet, Take 1 tablet (54 mg total) by mouth once daily., Disp: 90 tablet, Rfl: 3  gabapentin (NEURONTIN) 100 MG capsule, Take 1 capsule (100 mg total) by mouth 3 (three) times daily., Disp: 90 capsule, Rfl: 1  diclofenac sodium 1 % Gel, Apply 2 g topically 4 (four) times daily., Disp: 1 Tube, Rfl: 1    Allergies:  Review of patient's allergies indicates:  No Known Allergies          Review of Systems   Constitutional: Negative for fever and unexpected weight change.   HENT: Positive for hearing loss. Negative for congestion, ear pain, sinus pressure and tinnitus.    Respiratory: Negative for cough and shortness of breath.    Cardiovascular: Negative for chest pain, palpitations and leg swelling.   Gastrointestinal: Negative for abdominal pain and diarrhea.   Genitourinary: Negative for difficulty urinating.   Musculoskeletal: Positive for gait problem.   Neurological: Positive for dizziness. Negative for syncope and headaches.        Objective:      Physical Exam   Constitutional: He is oriented to person, place, and time. He appears well-developed and well-nourished. No distress.   HENT:   Right TM scarring; left TM intact without erythema or drainage   Eyes: Pupils are equal, round, and reactive to light. No scleral icterus.   Cardiovascular: Normal rate, regular rhythm and normal heart sounds.    Pulmonary/Chest: Effort normal and breath sounds normal. No respiratory distress.   Abdominal: Soft. Bowel sounds are normal.   Neurological: He is alert and oriented to person, place, and time.   Skin: Skin is warm and dry.   Psychiatric: He has a normal mood and affect.   Vitals reviewed.      Assessment:       1. Dizziness    2. Diastolic dysfunction    3. Immunization due        Plan:       Long was seen today for hospital follow up.    Diagnoses and all orders for this visit:    Dizziness  -     Not orthostatic  -     Possibly vertigo  -     Ambulatory consult to Audiology    Diastolic dysfunction  -     Asymptomatic  -     Monitor    Immunization due  -     Influenza - High Dose (65+) (PF) (IM)  -     (In Office Administered) Pneumococcal Conjugate Vaccine (13 Valent) (IM)    RTC after audiology evaluation.

## 2017-12-13 NOTE — TELEPHONE ENCOUNTER
Called and spoke with pt son. Informed him that his dad needs to come in for a f/u from audiology. Scheduled pt per request on 12/20/17 at 3:40pm.

## 2017-12-13 NOTE — TELEPHONE ENCOUNTER
----- Message from Penny Watson DO sent at 12/12/2017 11:41 AM CST -----  Schedule f/u after evaluation by audiology.

## 2017-12-14 ENCOUNTER — CLINICAL SUPPORT (OUTPATIENT)
Dept: AUDIOLOGY | Facility: CLINIC | Age: 82
End: 2017-12-14
Payer: MEDICARE

## 2017-12-14 DIAGNOSIS — H90.3 SENSORINEURAL HEARING LOSS, BILATERAL: Primary | ICD-10-CM

## 2017-12-14 DIAGNOSIS — H81.8X9 OTHER DISORDERS OF VESTIBULAR FUNCTION, UNSPECIFIED EAR: ICD-10-CM

## 2017-12-14 PROCEDURE — 92540 BASIC VESTIBULAR EVALUATION: CPT | Mod: 26,S$PBB,, | Performed by: AUDIOLOGIST-HEARING AID FITTER

## 2017-12-14 PROCEDURE — 92537 CALORIC VSTBLR TEST W/REC: CPT | Mod: PBBFAC | Performed by: AUDIOLOGIST-HEARING AID FITTER

## 2017-12-14 PROCEDURE — 92553 AUDIOMETRY AIR & BONE: CPT | Mod: 52,PBBFAC | Performed by: AUDIOLOGIST-HEARING AID FITTER

## 2017-12-14 PROCEDURE — 92555 SPEECH THRESHOLD AUDIOMETRY: CPT | Mod: PBBFAC | Performed by: AUDIOLOGIST-HEARING AID FITTER

## 2017-12-14 PROCEDURE — 92540 BASIC VESTIBULAR EVALUATION: CPT | Mod: PBBFAC | Performed by: AUDIOLOGIST-HEARING AID FITTER

## 2017-12-14 PROCEDURE — 92567 TYMPANOMETRY: CPT | Mod: PBBFAC | Performed by: AUDIOLOGIST-HEARING AID FITTER

## 2017-12-14 PROCEDURE — 92537 CALORIC VSTBLR TEST W/REC: CPT | Mod: 26,S$PBB,, | Performed by: AUDIOLOGIST-HEARING AID FITTER

## 2017-12-14 RX ORDER — GABAPENTIN 100 MG/1
100 CAPSULE ORAL 3 TIMES DAILY
Qty: 90 CAPSULE | Refills: 0 | Status: SHIPPED | OUTPATIENT
Start: 2017-12-14 | End: 2017-12-20 | Stop reason: SINTOL

## 2017-12-14 RX ORDER — FENOFIBRATE 54 MG/1
54 TABLET ORAL DAILY
Qty: 90 TABLET | Refills: 0 | Status: SHIPPED | OUTPATIENT
Start: 2017-12-14 | End: 2022-01-21

## 2017-12-14 NOTE — PROGRESS NOTES
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  Patient is accompanied by his daughter who assisted with history and interpretation.  Patient speaks Nauruan.  He complains of dizziness and imbalance upon standing from seated or supine, lasting for a few minutes.  He describes spinning sensations at times.  Once he is up, he does not get dizzy with exception of occasionally with rapid movements.  He has hearing loss and tinnitus in both ears.  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     Audiology Report:  Results reveal a mild-to-severe sensorineural hearing loss 250-8000 Hz for the right ear, and a mild-to-severe sensorineural hearing loss 250-8000 Hz for the left ear.   Speech Awareness Thresholds were 30 dBHL for the right ear and 35 dBHL for the left ear.   Word recognition scores could not be tested because non-English speaker.   Tympanograms were Type A, normal for the right ear and Type A, normal for the left ear.      Videonystagmography Report (VNG):  Oculomotor function tests:  1. Sinusoidal tracking did not indicate nystagmus; patient unable to keep eyes open wide for analysis.  2. Saccade did not reveal nystagmus; patient unable to keep eyes open wide for analysis  3. OPKs could not be tested because patient unable to keep eyes open or understand task.   Spontaneous test was absent for nystagmus.  Gaze test was absent for nystagmus.  Static Positional test was absent for nystagmus.  [place] Right was negative for BPPV.  [place] Left was negative for BPPV.  Bi-thermal caloric test was Normal.  Fixation suppression following caloric irrigations was normal.  The following values were obtained:  Unilateral weakness (UW): 6% right ear  Directional preponderance (DP): 12% right beating  RC: 6 d/s   d/s  RW: 9 d/s  LW: 8 d/s    Summary: Normal VNG, no evidence of inner ear asymmetry or  BPPV.    Recommendations:  1. PCP   2. Hearing aids, binaural. Information packet was provided.     Patient was counseled on the above findings.  Tracings are to be scanned.

## 2017-12-20 ENCOUNTER — OFFICE VISIT (OUTPATIENT)
Dept: INTERNAL MEDICINE | Facility: CLINIC | Age: 82
End: 2017-12-20
Payer: MEDICARE

## 2017-12-20 VITALS
HEART RATE: 70 BPM | DIASTOLIC BLOOD PRESSURE: 60 MMHG | BODY MASS INDEX: 27.35 KG/M2 | OXYGEN SATURATION: 96 % | SYSTOLIC BLOOD PRESSURE: 118 MMHG | HEIGHT: 60 IN | TEMPERATURE: 98 F | WEIGHT: 139.31 LBS

## 2017-12-20 DIAGNOSIS — R42 DIZZINESS: Primary | ICD-10-CM

## 2017-12-20 PROCEDURE — 99999 PR PBB SHADOW E&M-EST. PATIENT-LVL III: CPT | Mod: PBBFAC,,, | Performed by: INTERNAL MEDICINE

## 2017-12-20 PROCEDURE — 99213 OFFICE O/P EST LOW 20 MIN: CPT | Mod: PBBFAC | Performed by: INTERNAL MEDICINE

## 2017-12-20 PROCEDURE — 99214 OFFICE O/P EST MOD 30 MIN: CPT | Mod: S$PBB,,, | Performed by: INTERNAL MEDICINE

## 2017-12-22 NOTE — PROGRESS NOTES
Long Roth  87 y.o.  Unknown male    Chief Complaint   Patient presents with    Follow-up       HPI:   Presents to the clinic with his family member to follow up on dizziness. He speaks Kosovan. His family member is translating.   He was recently evaluated by audiology. He needs hearing aids but no cause for dizziness was found. Imaging and cardiac workup were negative recently. Orthostatics were negative recently. He is only taking gabapentin and fenofibrate. He was recently started on these medications.     PMH: Reviewed    MEDS: Reviewed med card    ALLERGIES: Reviewed allergy card    PE: Reviewed vitals  GENERAL: Alert and oriented, no acute distress  HEART: Regular rate  LUNGS: Unlabored respirations     ASSESSMENT/PLAN:    Long was seen today for follow-up.    Diagnoses and all orders for this visit:    Dizziness  -     Recommend stopping gabapentin  -     Instructions provided for gabapentin weaning    F/U in 3-4 weeks if dizziness persists.

## 2019-09-12 ENCOUNTER — PATIENT OUTREACH (OUTPATIENT)
Dept: ADMINISTRATIVE | Facility: HOSPITAL | Age: 84
End: 2019-09-12

## 2019-11-26 ENCOUNTER — PATIENT OUTREACH (OUTPATIENT)
Dept: ADMINISTRATIVE | Facility: HOSPITAL | Age: 84
End: 2019-11-26

## 2020-03-12 ENCOUNTER — HOSPITAL ENCOUNTER (EMERGENCY)
Facility: HOSPITAL | Age: 85
Discharge: HOME | End: 2020-03-12
Attending: EMERGENCY MEDICINE
Payer: MEDICARE

## 2020-03-12 VITALS
HEIGHT: 60 IN | BODY MASS INDEX: 26.69 KG/M2 | SYSTOLIC BLOOD PRESSURE: 175 MMHG | HEART RATE: 87 BPM | DIASTOLIC BLOOD PRESSURE: 81 MMHG | OXYGEN SATURATION: 94 % | TEMPERATURE: 99 F | WEIGHT: 135.94 LBS | RESPIRATION RATE: 18 BRPM

## 2020-03-12 DIAGNOSIS — R05.9 COUGH: ICD-10-CM

## 2020-03-12 DIAGNOSIS — R03.0 ELEVATED BLOOD PRESSURE READING: ICD-10-CM

## 2020-03-12 DIAGNOSIS — R06.00 DYSPNEA: ICD-10-CM

## 2020-03-12 DIAGNOSIS — J40 BRONCHITIS: Primary | ICD-10-CM

## 2020-03-12 DIAGNOSIS — J98.01 BRONCHOSPASM: ICD-10-CM

## 2020-03-12 LAB
ALBUMIN SERPL BCP-MCNC: 3.6 G/DL (ref 3.5–5.2)
ALP SERPL-CCNC: 85 U/L (ref 55–135)
ALT SERPL W/O P-5'-P-CCNC: 29 U/L (ref 10–44)
ANION GAP SERPL CALC-SCNC: 8 MMOL/L (ref 8–16)
AST SERPL-CCNC: 32 U/L (ref 10–40)
BASOPHILS # BLD AUTO: 0.02 K/UL (ref 0–0.2)
BASOPHILS NFR BLD: 0.3 % (ref 0–1.9)
BILIRUB SERPL-MCNC: 0.7 MG/DL (ref 0.1–1)
BNP SERPL-MCNC: 255 PG/ML (ref 0–99)
BUN SERPL-MCNC: 10 MG/DL (ref 8–23)
CALCIUM SERPL-MCNC: 8.9 MG/DL (ref 8.7–10.5)
CHLORIDE SERPL-SCNC: 106 MMOL/L (ref 95–110)
CO2 SERPL-SCNC: 25 MMOL/L (ref 23–29)
CREAT SERPL-MCNC: 0.7 MG/DL (ref 0.5–1.4)
DIFFERENTIAL METHOD: NORMAL
EOSINOPHIL # BLD AUTO: 0.1 K/UL (ref 0–0.5)
EOSINOPHIL NFR BLD: 2.2 % (ref 0–8)
ERYTHROCYTE [DISTWIDTH] IN BLOOD BY AUTOMATED COUNT: 11.8 % (ref 11.5–14.5)
EST. GFR  (AFRICAN AMERICAN): >60 ML/MIN/1.73 M^2
EST. GFR  (NON AFRICAN AMERICAN): >60 ML/MIN/1.73 M^2
GLUCOSE SERPL-MCNC: 96 MG/DL (ref 70–110)
HCT VFR BLD AUTO: 41.6 % (ref 40–54)
HGB BLD-MCNC: 14.6 G/DL (ref 14–18)
IMM GRANULOCYTES # BLD AUTO: 0.01 K/UL (ref 0–0.04)
IMM GRANULOCYTES NFR BLD AUTO: 0.2 % (ref 0–0.5)
INFLUENZA A, MOLECULAR: NEGATIVE
INFLUENZA B, MOLECULAR: NEGATIVE
LYMPHOCYTES # BLD AUTO: 1.5 K/UL (ref 1–4.8)
LYMPHOCYTES NFR BLD: 23.9 % (ref 18–48)
MCH RBC QN AUTO: 30.4 PG (ref 27–31)
MCHC RBC AUTO-ENTMCNC: 35.1 G/DL (ref 32–36)
MCV RBC AUTO: 87 FL (ref 82–98)
MONOCYTES # BLD AUTO: 0.6 K/UL (ref 0.3–1)
MONOCYTES NFR BLD: 9.5 % (ref 4–15)
NEUTROPHILS # BLD AUTO: 4.1 K/UL (ref 1.8–7.7)
NEUTROPHILS NFR BLD: 63.9 % (ref 38–73)
NRBC BLD-RTO: 0 /100 WBC
PLATELET # BLD AUTO: 169 K/UL (ref 150–350)
PMV BLD AUTO: 10.3 FL (ref 9.2–12.9)
POTASSIUM SERPL-SCNC: 4.1 MMOL/L (ref 3.5–5.1)
PROT SERPL-MCNC: 7.6 G/DL (ref 6–8.4)
RBC # BLD AUTO: 4.8 M/UL (ref 4.6–6.2)
SODIUM SERPL-SCNC: 139 MMOL/L (ref 136–145)
SPECIMEN SOURCE: NORMAL
WBC # BLD AUTO: 6.39 K/UL (ref 3.9–12.7)

## 2020-03-12 PROCEDURE — 63600175 PHARM REV CODE 636 W HCPCS: Performed by: EMERGENCY MEDICINE

## 2020-03-12 PROCEDURE — 87502 INFLUENZA DNA AMP PROBE: CPT

## 2020-03-12 PROCEDURE — 83880 ASSAY OF NATRIURETIC PEPTIDE: CPT

## 2020-03-12 PROCEDURE — 25000003 PHARM REV CODE 250: Performed by: EMERGENCY MEDICINE

## 2020-03-12 PROCEDURE — 96374 THER/PROPH/DIAG INJ IV PUSH: CPT

## 2020-03-12 PROCEDURE — 80053 COMPREHEN METABOLIC PANEL: CPT

## 2020-03-12 PROCEDURE — 94761 N-INVAS EAR/PLS OXIMETRY MLT: CPT

## 2020-03-12 PROCEDURE — 94640 AIRWAY INHALATION TREATMENT: CPT

## 2020-03-12 PROCEDURE — 93010 EKG 12-LEAD: ICD-10-PCS | Mod: ,,, | Performed by: INTERNAL MEDICINE

## 2020-03-12 PROCEDURE — 93005 ELECTROCARDIOGRAM TRACING: CPT

## 2020-03-12 PROCEDURE — 25000242 PHARM REV CODE 250 ALT 637 W/ HCPCS: Performed by: EMERGENCY MEDICINE

## 2020-03-12 PROCEDURE — 99285 EMERGENCY DEPT VISIT HI MDM: CPT | Mod: 25

## 2020-03-12 PROCEDURE — 85025 COMPLETE CBC W/AUTO DIFF WBC: CPT

## 2020-03-12 PROCEDURE — 93010 ELECTROCARDIOGRAM REPORT: CPT | Mod: ,,, | Performed by: INTERNAL MEDICINE

## 2020-03-12 RX ORDER — IPRATROPIUM BROMIDE AND ALBUTEROL SULFATE 2.5; .5 MG/3ML; MG/3ML
3 SOLUTION RESPIRATORY (INHALATION)
Status: COMPLETED | OUTPATIENT
Start: 2020-03-12 | End: 2020-03-12

## 2020-03-12 RX ORDER — DOXYCYCLINE HYCLATE 100 MG
100 TABLET ORAL
Status: COMPLETED | OUTPATIENT
Start: 2020-03-12 | End: 2020-03-12

## 2020-03-12 RX ORDER — METHYLPREDNISOLONE SOD SUCC 125 MG
80 VIAL (EA) INJECTION ONCE
Status: COMPLETED | OUTPATIENT
Start: 2020-03-12 | End: 2020-03-12

## 2020-03-12 RX ORDER — DOXYCYCLINE 100 MG/1
100 CAPSULE ORAL 2 TIMES DAILY
Qty: 19 CAPSULE | Refills: 0 | Status: SHIPPED | OUTPATIENT
Start: 2020-03-12 | End: 2020-03-22

## 2020-03-12 RX ORDER — PREDNISONE 20 MG/1
TABLET ORAL
Qty: 10 TABLET | Refills: 0 | Status: SHIPPED | OUTPATIENT
Start: 2020-03-12 | End: 2022-01-07 | Stop reason: SDUPTHER

## 2020-03-12 RX ORDER — ALBUTEROL SULFATE 90 UG/1
1-2 AEROSOL, METERED RESPIRATORY (INHALATION) EVERY 6 HOURS PRN
Qty: 18 G | Refills: 0 | Status: SHIPPED | OUTPATIENT
Start: 2020-03-12 | End: 2022-01-07 | Stop reason: SDUPTHER

## 2020-03-12 RX ADMIN — METHYLPREDNISOLONE SODIUM SUCCINATE 80 MG: 125 INJECTION, POWDER, FOR SOLUTION INTRAMUSCULAR; INTRAVENOUS at 12:03

## 2020-03-12 RX ADMIN — IPRATROPIUM BROMIDE AND ALBUTEROL SULFATE 3 ML: .5; 3 SOLUTION RESPIRATORY (INHALATION) at 11:03

## 2020-03-12 RX ADMIN — DOXYCYCLINE HYCLATE 100 MG: 100 TABLET, COATED ORAL at 04:03

## 2020-03-12 RX ADMIN — IPRATROPIUM BROMIDE AND ALBUTEROL SULFATE 3 ML: .5; 3 SOLUTION RESPIRATORY (INHALATION) at 03:03

## 2020-03-12 NOTE — ED NOTES
Pt. Resting in bed. No acute distress, RR equal and non labored with minimal snoring when asleep, VSS. Bed in low, locked, and call light in reach. Side rails up X 2. Will continue to monitor.

## 2020-03-12 NOTE — ED PROVIDER NOTES
SCRIBE #1 NOTE: I, Issa Yoder, am scribing for, and in the presence of, Haydee Encinas DO. I have scribed the entire note.       History     Chief Complaint   Patient presents with    Cough     cough and shortness of breath x 1 week.      Review of patient's allergies indicates:  No Known Allergies      History of Present Illness     HPI    3/12/2020, 11:13 AM  History obtained from the daughter and patient    used. MARRTI used.      History of Present Illness: Long Albarran is a 89 y.o. male patient with a PMHx of HTN who presents to the Emergency Department for evaluation of productive cough (yellow) which onset gradually 1 week ago. Symptoms are intermittent and moderate in severity. No mitigating or exacerbating factors reported. Associated sxs include SOB, body aches and fatigue. Patient denies any CP, fever, chills, N/V/D, nasal congestion, recent travel, abd pain and all other sxs at this time. No prior Tx reported. No further complaints or concerns at this time.       Arrival mode: Personal vehicle    PCP: Penny Watson DO        Past Medical History:  Past Medical History:   Diagnosis Date    Diastolic dysfunction 12/2017    Hypertriglyceridemia        Past Surgical History:  Past Surgical History:   Procedure Laterality Date    ABDOMINAL SURGERY      appendix removal in [place] 3 years ago    CATARACT EXTRACTION, BILATERAL           Family History:  Family History   Problem Relation Age of Onset    Cancer Neg Hx     Diabetes Neg Hx     Heart disease Neg Hx     Hypertension Neg Hx     Kidney disease Neg Hx     Stroke Neg Hx        Social History:  Social History     Tobacco Use    Smoking status: Former Smoker    Smokeless tobacco: Never Used   Substance and Sexual Activity    Alcohol use: No     Alcohol/week: 0.0 standard drinks    Drug use: No    Sexual activity: Unknown        Review of Systems     Review of Systems   Constitutional: Positive for fatigue. Negative for  chills and fever.   HENT: Negative for congestion, facial swelling and sore throat.    Respiratory: Positive for cough and shortness of breath (secondary to cough). Negative for chest tightness.    Cardiovascular: Negative for chest pain.   Gastrointestinal: Negative for abdominal pain, diarrhea, nausea and vomiting.   Genitourinary: Negative for dysuria.   Musculoskeletal: Positive for myalgias (generalized). Negative for back pain.   Skin: Negative for rash.   Neurological: Negative for weakness.   Hematological: Does not bruise/bleed easily.   All other systems reviewed and are negative.       Physical Exam     Initial Vitals [03/12/20 1030]   BP Pulse Resp Temp SpO2   (!) 188/80 72 (!) 22 97.7 °F (36.5 °C) 96 %      MAP       --          Physical Exam  Nursing Notes and Vital Signs Reviewed.  Constitutional: Patient is in no acute distress. Well-developed and well-nourished.  Head: Atraumatic. Normocephalic.  Eyes: PERRL. EOM intact. Conjunctivae are not pale. No scleral icterus.  ENT: Mucous membranes are moist. Oropharynx is clear and symmetric.    Neck: No JVD. Supple. Full ROM. No lymphadenopathy.  Cardiovascular: Regular rate. Regular rhythm. No murmurs, rubs, or gallops. Distal pulses are 2+ and symmetric.  Pulmonary/Chest: No respiratory distress. Wheezing noted.  Abdominal: Soft and non-distended.  There is no tenderness.  No rebound, guarding, or rigidity. Good bowel sounds.  Genitourinary: No CVA tenderness  Musculoskeletal: Moves all extremities. No obvious deformities. No edema. No calf tenderness.  Skin: Warm and dry.  Neurological:  Alert, awake, and appropriate.  Normal speech.  No acute focal neurological deficits are appreciated.  Psychiatric: Normal affect. Good eye contact. Appropriate in content.     ED Course   Procedures  ED Vital Signs:  Vitals:    03/12/20 1030 03/12/20 1130 03/12/20 1135 03/12/20 1140   BP: (!) 188/80      Pulse: 72 67 67 74   Resp: (!) 22 18 18 18   Temp: 97.7 °F (36.5  "°C)      TempSrc: Oral      SpO2: 96% 96% 96% 98%   Weight: 61.7 kg (135 lb 14.6 oz)      Height: 4' 11" (1.499 m)       03/12/20 1218 03/12/20 1229 03/12/20 1302 03/12/20 1322   BP: (!) 176/83 133/64 (!) 162/72    Pulse: 82 82 78 82   Resp:       Temp:       TempSrc:       SpO2: 98% 96% 95% 95%   Weight:       Height:        03/12/20 1402 03/12/20 1502 03/12/20 1519   BP: (!) 156/74 (!) 147/74    Pulse: 79 79 86   Resp:   18   Temp:      TempSrc:      SpO2: 95% 95% 96%   Weight:      Height:          Abnormal Lab Results:  Labs Reviewed   B-TYPE NATRIURETIC PEPTIDE - Abnormal; Notable for the following components:       Result Value     (*)     All other components within normal limits   INFLUENZA A & B BY MOLECULAR   CBC W/ AUTO DIFFERENTIAL   COMPREHENSIVE METABOLIC PANEL        All Lab Results:  Results for orders placed or performed during the hospital encounter of 03/12/20   Influenza A & B by Molecular   Result Value Ref Range    Influenza A, Molecular Negative Negative    Influenza B, Molecular Negative Negative    Flu A & B Source Nasal swab    CBC auto differential   Result Value Ref Range    WBC 6.39 3.90 - 12.70 K/uL    RBC 4.80 4.60 - 6.20 M/uL    Hemoglobin 14.6 14.0 - 18.0 g/dL    Hematocrit 41.6 40.0 - 54.0 %    Mean Corpuscular Volume 87 82 - 98 fL    Mean Corpuscular Hemoglobin 30.4 27.0 - 31.0 pg    Mean Corpuscular Hemoglobin Conc 35.1 32.0 - 36.0 g/dL    RDW 11.8 11.5 - 14.5 %    Platelets 169 150 - 350 K/uL    MPV 10.3 9.2 - 12.9 fL    Immature Granulocytes 0.2 0.0 - 0.5 %    Gran # (ANC) 4.1 1.8 - 7.7 K/uL    Immature Grans (Abs) 0.01 0.00 - 0.04 K/uL    Lymph # 1.5 1.0 - 4.8 K/uL    Mono # 0.6 0.3 - 1.0 K/uL    Eos # 0.1 0.0 - 0.5 K/uL    Baso # 0.02 0.00 - 0.20 K/uL    nRBC 0 0 /100 WBC    Gran% 63.9 38.0 - 73.0 %    Lymph% 23.9 18.0 - 48.0 %    Mono% 9.5 4.0 - 15.0 %    Eosinophil% 2.2 0.0 - 8.0 %    Basophil% 0.3 0.0 - 1.9 %    Differential Method Automated    Comprehensive " metabolic panel   Result Value Ref Range    Sodium 139 136 - 145 mmol/L    Potassium 4.1 3.5 - 5.1 mmol/L    Chloride 106 95 - 110 mmol/L    CO2 25 23 - 29 mmol/L    Glucose 96 70 - 110 mg/dL    BUN, Bld 10 8 - 23 mg/dL    Creatinine 0.7 0.5 - 1.4 mg/dL    Calcium 8.9 8.7 - 10.5 mg/dL    Total Protein 7.6 6.0 - 8.4 g/dL    Albumin 3.6 3.5 - 5.2 g/dL    Total Bilirubin 0.7 0.1 - 1.0 mg/dL    Alkaline Phosphatase 85 55 - 135 U/L    AST 32 10 - 40 U/L    ALT 29 10 - 44 U/L    Anion Gap 8 8 - 16 mmol/L    eGFR if African American >60 >60 mL/min/1.73 m^2    eGFR if non African American >60 >60 mL/min/1.73 m^2   Brain natriuretic peptide   Result Value Ref Range     (H) 0 - 99 pg/mL         Imaging Results:  Imaging Results          X-Ray Chest PA And Lateral (Final result)  Result time 03/12/20 11:40:23    Final result by Kunal Shepard MD (03/12/20 11:40:23)                 Impression:      No acute abnormality.      Electronically signed by: Kunal Shepard  Date:    03/12/2020  Time:    11:40             Narrative:    EXAMINATION:  XR CHEST PA AND LATERAL    CLINICAL HISTORY:  Cough    TECHNIQUE:  PA and lateral views of the chest were performed.    COMPARISON:  11/25/2017    FINDINGS:  The lungs are clear, with normal appearance of pulmonary vasculature and no pleural effusion or pneumothorax.    The cardiac silhouette is normal in size. The hilar and mediastinal contours are unremarkable.    Chronic appearing anterior wedge compression of T11 and T10.                                 The EKG was ordered, reviewed, and independently interpreted by the ED provider.  Interpretation time: 11:53  Rate: 71 BPM  Rhythm: Sinus rhythm with 1st degree AV block.    Interpretation: Left ventricular hypertrophy with repolarization abnormality. Prolonged QT. No STEMI.  When compared to EKG performed 11/25/2017, there are no significant changes.           The Emergency Provider reviewed the vital signs and test results, which  are outlined above.     ED Discussion       3:17 PM: Re-evaluated pt. Pt states he is feeling better after the breathing treatment.  D/w pt all pertinent results. D/w pt any concerns expressed at this time. Answered all questions. Pt expresses understanding at this time.    3:52 PM: Reassessed pt at this time.  Slight wheezing persists-but there is no evidence of any respiratory distress.  No conversational dyspnea.  Patient states that he feels significantly better.  Pt states his condition has improved at this time. Discussed with pt all pertinent ED information and results. Discussed pt dx and plan of tx. Gave pt all f/u and return to the ED instructions. All questions and concerns were addressed at this time. Pt expresses understanding of information and instructions, and is comfortable with plan to discharge. Pt is stable for discharge.    I discussed with patient and/or family/caretaker that evaluation in the ED does not suggest any emergent or life threatening medical conditions requiring immediate intervention beyond what was provided in the ED, and I believe patient is safe for discharge.  Regardless, an unremarkable evaluation in the ED does not preclude the development or presence of a serious of life threatening condition. As such, patient was instructed to return immediately for any worsening or change in current symptoms.           Medical Decision Making:   Clinical Tests:   Lab Tests: Ordered and Reviewed  Radiological Study: Ordered and Reviewed  Medical Tests: Ordered and Reviewed           ED Medication(s):  Medications   doxycycline tablet 100 mg (has no administration in time range)   albuterol-ipratropium 2.5 mg-0.5 mg/3 mL nebulizer solution 3 mL (3 mLs Nebulization Given 3/12/20 1140)   methylPREDNISolone sodium succinate injection 80 mg (80 mg Intravenous Given 3/12/20 1222)   albuterol-ipratropium 2.5 mg-0.5 mg/3 mL nebulizer solution 3 mL (3 mLs Nebulization Given 3/12/20 1519)       New  Prescriptions    ALBUTEROL (PROVENTIL/VENTOLIN HFA) 90 MCG/ACTUATION INHALER    Inhale 1-2 puffs into the lungs every 6 (six) hours as needed. Rescue    DOXYCYCLINE (VIBRAMYCIN) 100 MG CAP    Take 1 capsule (100 mg total) by mouth 2 (two) times daily. for 10 days    PREDNISONE (DELTASONE) 20 MG TABLET    Take 2 tablets by mouth daily for 5 days.       Follow-up Information     Penny Watson DO In 2 days.    Specialty:  Internal Medicine  Why:  Return to emergency department for:  Difficulty breathing, chest pain, chest pressure, shortness of breath, nausea, vomiting, or other concerns.  Contact information:  [address] [place] [address] DR Erik [place] [address]  [phone]                       Scribe Attestation:   Scribe #1: I performed the above scribed service and the documentation accurately describes the services I performed. I attest to the accuracy of the note.     Attending:   Physician Attestation Statement for Scribe #1: I, Haydee Encinas DO, personally performed the services described in this documentation, as scribed by Issa Yoder, in my presence, and it is both accurate and complete.           Clinical Impression       ICD-10-CM ICD-9-CM   1. Bronchitis J40 490   2. Cough R05 786.2   3. Dyspnea R06.00 786.09   4. Bronchospasm J98.01 519.11   5. Elevated blood pressure reading R03.0 796.2       Disposition:   Disposition: Discharged  Condition: Stable         Haydee Encinas DO  03/12/20 7154

## 2020-03-12 NOTE — PROGRESS NOTES
Daughter educated on indication and goals of nebs.  She interpreted it to pt. No distress obs. . PPE worn

## 2020-07-28 NOTE — LETTER
January 24, 2017      Le Henson MD  [address] [place] [address] Dr Erik [place] [address]           [facility]  [address] [place] [address] [place] [address]  Phone: [phone]  Fax: [phone]          Patient: Long Hines   MR Number: [id number]   YOB: 1929   Date of Visit: 1/24/2017       Dear Dr. Le Henson:    Thank you for referring Long Hines to me for evaluation. Attached you will find relevant portions of my assessment and plan of care.    If you have questions, please do not hesitate to call me. I look forward to following Long Hines along with you.    Sincerely,    Glenn Fitzpatrick PA-C    Enclosure  CC:  No Recipients    If you would like to receive this communication electronically, please contact [email or web address] or [phone] to request more information on [employer] access.    For providers and/or their staff who would like to refer a patient to [facility], please contact us through our one-stop-shop provider referral line, [facility], at [phone].    If you feel you have received this communication in error or would no longer like to receive these types of communications, please e-mail [email or web address]          Medication(s) Requested: clonazepam   ast office visit: 7-3-19  Last refill: 7-8-2020  Is the patient due for refill of this medication(s): No, given 30 day supply on 7-8-2020. Will be due 8-6-2020.   PDMP review: Criteria met. Forwarded to Physician/JOE for signature.      Please advise, patient has not gotten 1 mg dose since Aug. 2019.     Please advise.

## 2022-01-06 ENCOUNTER — HOSPITAL ENCOUNTER (EMERGENCY)
Facility: HOSPITAL | Age: 87
Discharge: HOME | End: 2022-01-07
Attending: EMERGENCY MEDICINE
Payer: MEDICARE

## 2022-01-06 DIAGNOSIS — R06.02 SOB (SHORTNESS OF BREATH): ICD-10-CM

## 2022-01-06 DIAGNOSIS — R06.2 DIFFUSE WHEEZING: Primary | ICD-10-CM

## 2022-01-06 PROCEDURE — 36000 PLACE NEEDLE IN VEIN: CPT

## 2022-01-06 PROCEDURE — 99285 EMERGENCY DEPT VISIT HI MDM: CPT | Mod: 25

## 2022-01-07 VITALS
HEIGHT: 65 IN | OXYGEN SATURATION: 95 % | WEIGHT: 140 LBS | DIASTOLIC BLOOD PRESSURE: 69 MMHG | BODY MASS INDEX: 23.32 KG/M2 | SYSTOLIC BLOOD PRESSURE: 161 MMHG | RESPIRATION RATE: 18 BRPM | HEART RATE: 74 BPM | TEMPERATURE: 98 F

## 2022-01-07 LAB
ALBUMIN SERPL BCP-MCNC: 3.8 G/DL (ref 3.5–5.2)
ALP SERPL-CCNC: 87 U/L (ref 55–135)
ALT SERPL W/O P-5'-P-CCNC: 24 U/L (ref 10–44)
ANION GAP SERPL CALC-SCNC: 7 MMOL/L (ref 8–16)
AST SERPL-CCNC: 37 U/L (ref 10–40)
BASOPHILS # BLD AUTO: 0.03 K/UL (ref 0–0.2)
BASOPHILS NFR BLD: 0.7 % (ref 0–1.9)
BILIRUB SERPL-MCNC: 0.8 MG/DL (ref 0.1–1)
BILIRUB UR QL STRIP: NEGATIVE
BNP SERPL-MCNC: 692 PG/ML (ref 0–99)
BUN SERPL-MCNC: 16 MG/DL (ref 10–30)
CALCIUM SERPL-MCNC: 8.7 MG/DL (ref 8.7–10.5)
CHLORIDE SERPL-SCNC: 109 MMOL/L (ref 95–110)
CLARITY UR: CLEAR
CO2 SERPL-SCNC: 25 MMOL/L (ref 23–29)
COLOR UR: YELLOW
CREAT SERPL-MCNC: 0.8 MG/DL (ref 0.5–1.4)
CTP QC/QA: YES
CTP QC/QA: YES
DIFFERENTIAL METHOD: ABNORMAL
EOSINOPHIL # BLD AUTO: 0.2 K/UL (ref 0–0.5)
EOSINOPHIL NFR BLD: 3.8 % (ref 0–8)
ERYTHROCYTE [DISTWIDTH] IN BLOOD BY AUTOMATED COUNT: 12.2 % (ref 11.5–14.5)
EST. GFR  (AFRICAN AMERICAN): >60 ML/MIN/1.73 M^2
EST. GFR  (NON AFRICAN AMERICAN): >60 ML/MIN/1.73 M^2
GLUCOSE SERPL-MCNC: 105 MG/DL (ref 70–110)
GLUCOSE UR QL STRIP: NEGATIVE
HCT VFR BLD AUTO: 40.3 % (ref 40–54)
HGB BLD-MCNC: 14.1 G/DL (ref 14–18)
HGB UR QL STRIP: ABNORMAL
IMM GRANULOCYTES # BLD AUTO: 0.01 K/UL (ref 0–0.04)
IMM GRANULOCYTES NFR BLD AUTO: 0.2 % (ref 0–0.5)
KETONES UR QL STRIP: NEGATIVE
LACTATE SERPL-SCNC: 0.8 MMOL/L (ref 0.5–2.2)
LEUKOCYTE ESTERASE UR QL STRIP: NEGATIVE
LYMPHOCYTES # BLD AUTO: 1 K/UL (ref 1–4.8)
LYMPHOCYTES NFR BLD: 23 % (ref 18–48)
MCH RBC QN AUTO: 30.1 PG (ref 27–31)
MCHC RBC AUTO-ENTMCNC: 35 G/DL (ref 32–36)
MCV RBC AUTO: 86 FL (ref 82–98)
MONOCYTES # BLD AUTO: 0.7 K/UL (ref 0.3–1)
MONOCYTES NFR BLD: 16.4 % (ref 4–15)
NEUTROPHILS # BLD AUTO: 2.4 K/UL (ref 1.8–7.7)
NEUTROPHILS NFR BLD: 55.9 % (ref 38–73)
NITRITE UR QL STRIP: NEGATIVE
NRBC BLD-RTO: 0 /100 WBC
PH UR STRIP: 6 [PH] (ref 5–8)
PLATELET # BLD AUTO: 141 K/UL (ref 150–450)
PMV BLD AUTO: 10.6 FL (ref 9.2–12.9)
POC MOLECULAR INFLUENZA A AGN: NEGATIVE
POC MOLECULAR INFLUENZA B AGN: NEGATIVE
POTASSIUM SERPL-SCNC: 3.5 MMOL/L (ref 3.5–5.1)
PROT SERPL-MCNC: 7.4 G/DL (ref 6–8.4)
PROT UR QL STRIP: ABNORMAL
RBC # BLD AUTO: 4.69 M/UL (ref 4.6–6.2)
SARS-COV-2 RDRP RESP QL NAA+PROBE: NEGATIVE
SODIUM SERPL-SCNC: 141 MMOL/L (ref 136–145)
SP GR UR STRIP: >=1.03 (ref 1–1.03)
TROPONIN I SERPL DL<=0.01 NG/ML-MCNC: 0.07 NG/ML (ref 0–0.03)
URN SPEC COLLECT METH UR: ABNORMAL
UROBILINOGEN UR STRIP-ACNC: NEGATIVE EU/DL
WBC # BLD AUTO: 4.22 K/UL (ref 3.9–12.7)

## 2022-01-07 PROCEDURE — 94640 AIRWAY INHALATION TREATMENT: CPT

## 2022-01-07 PROCEDURE — 80053 COMPREHEN METABOLIC PANEL: CPT | Performed by: EMERGENCY MEDICINE

## 2022-01-07 PROCEDURE — U0002 COVID-19 LAB TEST NON-CDC: HCPCS | Performed by: EMERGENCY MEDICINE

## 2022-01-07 PROCEDURE — 83880 ASSAY OF NATRIURETIC PEPTIDE: CPT | Performed by: EMERGENCY MEDICINE

## 2022-01-07 PROCEDURE — 81003 URINALYSIS AUTO W/O SCOPE: CPT | Performed by: EMERGENCY MEDICINE

## 2022-01-07 PROCEDURE — 99900035 HC TECH TIME PER 15 MIN (STAT)

## 2022-01-07 PROCEDURE — 93010 ELECTROCARDIOGRAM REPORT: CPT | Mod: ,,, | Performed by: INTERNAL MEDICINE

## 2022-01-07 PROCEDURE — 93005 ELECTROCARDIOGRAM TRACING: CPT

## 2022-01-07 PROCEDURE — 93010 EKG 12-LEAD: ICD-10-PCS | Mod: ,,, | Performed by: INTERNAL MEDICINE

## 2022-01-07 PROCEDURE — 27000221 HC OXYGEN, UP TO 24 HOURS

## 2022-01-07 PROCEDURE — 85025 COMPLETE CBC W/AUTO DIFF WBC: CPT | Performed by: EMERGENCY MEDICINE

## 2022-01-07 PROCEDURE — 84484 ASSAY OF TROPONIN QUANT: CPT | Performed by: EMERGENCY MEDICINE

## 2022-01-07 PROCEDURE — 63600175 PHARM REV CODE 636 W HCPCS: Performed by: EMERGENCY MEDICINE

## 2022-01-07 PROCEDURE — 83605 ASSAY OF LACTIC ACID: CPT | Performed by: EMERGENCY MEDICINE

## 2022-01-07 PROCEDURE — 25000242 PHARM REV CODE 250 ALT 637 W/ HCPCS: Performed by: EMERGENCY MEDICINE

## 2022-01-07 RX ORDER — PREDNISONE 20 MG/1
TABLET ORAL
Qty: 10 TABLET | Refills: 0 | Status: SHIPPED | OUTPATIENT
Start: 2022-01-07 | End: 2022-01-21

## 2022-01-07 RX ORDER — PREDNISONE 20 MG/1
40 TABLET ORAL
Status: COMPLETED | OUTPATIENT
Start: 2022-01-07 | End: 2022-01-07

## 2022-01-07 RX ORDER — ALBUTEROL SULFATE 90 UG/1
2 AEROSOL, METERED RESPIRATORY (INHALATION) EVERY 4 HOURS PRN
Qty: 18 G | Refills: 3 | Status: SHIPPED | OUTPATIENT
Start: 2022-01-07 | End: 2022-01-21

## 2022-01-07 RX ORDER — IPRATROPIUM BROMIDE AND ALBUTEROL SULFATE 2.5; .5 MG/3ML; MG/3ML
3 SOLUTION RESPIRATORY (INHALATION)
Status: COMPLETED | OUTPATIENT
Start: 2022-01-07 | End: 2022-01-07

## 2022-01-07 RX ADMIN — IPRATROPIUM BROMIDE AND ALBUTEROL SULFATE 3 ML: 2.5; .5 SOLUTION RESPIRATORY (INHALATION) at 01:01

## 2022-01-07 RX ADMIN — PREDNISONE 40 MG: 20 TABLET ORAL at 03:01

## 2022-01-07 NOTE — ED PROVIDER NOTES
SCRIBE #1 NOTE: I, Chanda Pendleton, am scribing for, and in the presence of, Brooke Carrillo MD. I have scribed the entire note.       History     Chief Complaint   Patient presents with    Shortness of Breath    Back Pain     Pt. States he's had BLE pain, back pain, sob, and coughing since Wednesday.  Pt. Denies any cp    Cough     Review of patient's allergies indicates:  No Known Allergies      History of Present Illness     HPI    1/7/2022, 12:19 AM  History obtained from the patient      History of Present Illness: Long Albarran is a 89 y.o. male patient with a PMHx of hypertriglyceridemia who presents to the Emergency Department for evaluation of SOB which onset gradually at 8.30 AM. Symptoms are intermittent and moderate in severity. No mitigating or exacerbating factors reported. Associated sxs include generalized myalgias, wheezing, and cough. Patient denies any N/V/D, fever, congestion, sore throat, HA, rhinorrhea, sore throat, HA, numbness, dizziness, and all other sxs at this time. The daughter also experiences fever and cough and has been tested for COVID-19 but has not received results. No prior Tx. No further complaints or concerns at this time.       Arrival mode: Personal vehicle    PCP: Penny Watson DO        Past Medical History:  Past Medical History:   Diagnosis Date    Diastolic dysfunction 12/2017    Hypertriglyceridemia        Past Surgical History:  Past Surgical History:   Procedure Laterality Date    ABDOMINAL SURGERY      appendix removal in [place] 3 years ago    CATARACT EXTRACTION, BILATERAL           Family History:  Family History   Problem Relation Age of Onset    Cancer Neg Hx     Diabetes Neg Hx     Heart disease Neg Hx     Hypertension Neg Hx     Kidney disease Neg Hx     Stroke Neg Hx        Social History:  Social History     Tobacco Use    Smoking status: Former Smoker    Smokeless tobacco: Never Used   Substance and Sexual Activity    Alcohol use: No      Alcohol/week: 0.0 standard drinks    Drug use: No    Sexual activity: Not on file        Review of Systems     Review of Systems   Constitutional: Negative for chills and fever.   HENT: Negative for congestion, rhinorrhea and sore throat.    Respiratory: Positive for cough, shortness of breath and wheezing.    Cardiovascular: Negative for chest pain.   Gastrointestinal: Negative for abdominal pain, diarrhea, nausea and vomiting.   Genitourinary: Negative for dysuria.   Musculoskeletal: Positive for myalgias (generalized). Negative for back pain.   Skin: Negative for rash.   Neurological: Negative for dizziness, syncope, weakness, numbness and headaches.   Hematological: Does not bruise/bleed easily.   All other systems reviewed and are negative.       Physical Exam     Initial Vitals [01/06/22 2347]   BP Pulse Resp Temp SpO2   (!) 160/97 89 (!) 23 98.1 °F (36.7 °C) (!) 92 %      MAP       --          Physical Exam  Nursing Notes and Vital Signs Reviewed.  Constitutional: Patient is in no acute distress. Well-developed and well-nourished.  Head: Atraumatic. Normocephalic.  Eyes: PERRL. EOM intact. Conjunctivae are not pale. No scleral icterus.  ENT: Mucous membranes are moist. Oropharynx is clear and symmetric.    Neck: Supple. Full ROM. No lymphadenopathy.  Cardiovascular: Regular rate. Regular rhythm. No murmurs, rubs, or gallops. Distal pulses are 2+ and symmetric.  Pulmonary/Chest: Tachypnea. Diffused Expiratory wheeze  Abdominal: Soft and non-distended.  There is no tenderness.  No rebound, guarding, or rigidity. Good bowel sounds.  Genitourinary: No CVA tenderness  Musculoskeletal: Moves all extremities. No obvious deformities. No edema. No calf tenderness.  Skin: Warm and dry.  Neurological:  Alert, awake, and appropriate.  Normal speech.  No acute focal neurological deficits are appreciated.  Psychiatric: Normal affect. Good eye contact. Appropriate in content.     ED Course   Procedures  ED Vital  "Signs:  Vitals:    01/06/22 2347 01/07/22 0035 01/07/22 0143 01/07/22 0207   BP: (!) 160/97   138/67   Pulse: 89 (!) 59 66 (!) 57   Resp: (!) 23  20    Temp: 98.1 °F (36.7 °C)      TempSrc: Oral      SpO2: (!) 92%  98% 95%   Weight:       Height:        01/07/22 0300 01/07/22 0500 01/07/22 0611   BP: 120/67 (!) 161/69    Pulse: 70 74    Resp: (!) 21 18    Temp:      TempSrc:      SpO2: 95% 95%    Weight:   63.5 kg (140 lb)   Height:   5' 5" (1.651 m)       Abnormal Lab Results:  Labs Reviewed   CBC W/ AUTO DIFFERENTIAL - Abnormal; Notable for the following components:       Result Value    Platelets 141 (*)     Mono % 16.4 (*)     All other components within normal limits   COMPREHENSIVE METABOLIC PANEL - Abnormal; Notable for the following components:    Anion Gap 7 (*)     All other components within normal limits   URINALYSIS, REFLEX TO URINE CULTURE - Abnormal; Notable for the following components:    Specific Gravity, UA >=1.030 (*)     Protein, UA Trace (*)     Occult Blood UA Trace (*)     All other components within normal limits    Narrative:     Specimen Source->Urine   TROPONIN I - Abnormal; Notable for the following components:    Troponin I 0.070 (*)     All other components within normal limits   B-TYPE NATRIURETIC PEPTIDE - Abnormal; Notable for the following components:     (*)     All other components within normal limits   LACTIC ACID, PLASMA   SARS-COV-2 RDRP GENE    Narrative:     This test utilizes isothermal nucleic acid amplification   technology to detect the SARS-CoV-2 RdRp nucleic acid segment.   The analytical sensitivity (limit of detection) is 125 genome   equivalents/mL.   A POSITIVE result implies infection with the SARS-CoV-2 virus;   the patient is presumed to be contagious.     A NEGATIVE result means that SARS-CoV-2 nucleic acids are not   present above the limit of detection. A NEGATIVE result should be   treated as presumptive. It does not rule out the possibility of "   COVID-19 and should not be the sole basis for treatment decisions.   If COVID-19 is strongly suspected based on clinical and exposure   history, re-testing using an alternate molecular assay should be   considered.   This test is only for use under the Food and Drug   Administration s Emergency Use Authorization (EUA).   Commercial kits are provided by [employer].   Performance characteristics of the EUA have been independently   verified by [facility] Department of   Pathology and Laboratory Medicine.   _________________________________________________________________   The authorized Fact Sheet for Healthcare Providers and the authorized Fact   Sheet for Patients of the ID NOW COVID-19 are available on the FDA   website:     https://www.fda.gov/media/269414/download  https://www.fda.gov/media/551558/download         POCT INFLUENZA A/B MOLECULAR        All Lab Results:      Imaging Results:  Imaging Results          X-Ray Chest AP Portable (Final result)  Result time 01/07/22 07:17:00    Final result by Luciano Pagan MD (01/07/22 07:17:00)                 Impression:      No acute findings.      Electronically signed by: Luciano Pagan MD  Date:    01/07/2022  Time:    07:17             Narrative:    EXAMINATION:  XR CHEST AP PORTABLE    CLINICAL HISTORY:  Shortness of breath.,    COMPARISON:  03/12/2020 chest x-ray    FINDINGS:  Multiple wire leads overlie the chest.  Normal heart size.  Aortic atherosclerosis.    Decreased lung volumes with no definite acute infiltrate or failure.                            5:35 AM: Per ED physician, pt's Chest XR results: no acute findings.    The EKG was ordered, reviewed, and independently interpreted by the ED provider.  Interpretation time: 12:40 AM  Rate: 61 BPM  Rhythm: atrial fibrillation  Interpretation: No STEMI. Minimal voltage criteria for LVH, may be normal variant ( Sokolow-Mahajan )  Septal infarct ,age undetermined  ST and Marked T  wave abnormality, consider anterolateral ischemia             The Emergency Provider reviewed the vital signs and test results, which are outlined above.     ED Discussion     2:37 AM: Re-evaluated pt. Pt is resting comfortably and is in no acute distress.  Pt states SOB has mitigated after breathing treatment.  D/w pt all pertinent results. D/w pt any concerns expressed at this time. Answered all questions. Pt expresses understanding at this time.    5:33 AM: Reassessed pt at this time. Discussed with pt all pertinent ED information and results. Discussed pt dx and plan of tx. Gave pt all f/u and return to the ED instructions. All questions and concerns were addressed at this time. Pt expresses understanding of information and instructions, and is comfortable with plan to discharge. Pt is stable for discharge.    I discussed with patient and/or family/caretaker that evaluation in the ED does not suggest any emergent or life threatening medical conditions requiring immediate intervention beyond what was provided in the ED, and I believe patient is safe for discharge.  Regardless, an unremarkable evaluation in the ED does not preclude the development or presence of a serious of life threatening condition. As such, patient was instructed to return immediately for any worsening or change in current symptoms.     Medical Decision Making:   Clinical Tests:   Lab Tests: Ordered and Reviewed  Radiological Study: Ordered and Reviewed  Medical Tests: Ordered and Reviewed           ED Medication(s):  Medications   albuterol-ipratropium 2.5 mg-0.5 mg/3 mL nebulizer solution 3 mL (3 mLs Nebulization Given 1/7/22 0143)   predniSONE tablet 40 mg (40 mg Oral Given 1/7/22 0301)       Discharge Medication List as of 1/7/2022  5:32 AM           Follow-up Information     Penny Watson DO In 3 days.    Specialty: Internal Medicine  Contact information:  [address] [place] [address] DR Erik [place] [address] [phone]             [facility]  [facility]..    Specialty: Emergency Medicine  Why: As needed, If symptoms worsen  Contact information:  [address] [facility] [address] [phone]                           Scribe Attestation:   Scribe #1: I performed the above scribed service and the documentation accurately describes the services I performed. I attest to the accuracy of the note.     Attending:   Physician Attestation Statement for Scribe #1: I, Brooke Carrillo MD, personally performed the services described in this documentation, as scribed by Chanda Pendleton, in my presence, and it is both accurate and complete.           Clinical Impression       ICD-10-CM ICD-9-CM   1. Diffuse wheezing  R06.2 786.07   2. SOB (shortness of breath)  R06.02 786.05       Disposition:   Disposition: Discharged  Condition: Stable       Brooke Carrillo MD  01/09/22 2002

## 2022-01-12 ENCOUNTER — TELEPHONE (OUTPATIENT)
Dept: INTERNAL MEDICINE | Facility: CLINIC | Age: 87
End: 2022-01-12
Payer: MEDICAID

## 2022-01-12 NOTE — TELEPHONE ENCOUNTER
----- Message from Kenji Varghese sent at 1/12/2022  2:04 PM CST -----  Contact: Moon/ Daughter  Moon would like a call back at [phone], in regards to getting the patient an hospital follow up scheduled on 01/19/22.

## 2022-01-21 ENCOUNTER — OFFICE VISIT (OUTPATIENT)
Dept: INTERNAL MEDICINE | Facility: CLINIC | Age: 87
End: 2022-01-21
Payer: MEDICARE

## 2022-01-21 VITALS
WEIGHT: 138 LBS | SYSTOLIC BLOOD PRESSURE: 124 MMHG | TEMPERATURE: 98 F | OXYGEN SATURATION: 99 % | DIASTOLIC BLOOD PRESSURE: 60 MMHG | BODY MASS INDEX: 22.99 KG/M2 | RESPIRATION RATE: 18 BRPM | HEIGHT: 65 IN | HEART RATE: 54 BPM

## 2022-01-21 DIAGNOSIS — R06.02 SHORTNESS OF BREATH: Primary | ICD-10-CM

## 2022-01-21 DIAGNOSIS — R00.1 BRADYCARDIA: ICD-10-CM

## 2022-01-21 DIAGNOSIS — I48.91 ATRIAL FIBRILLATION, UNSPECIFIED TYPE: ICD-10-CM

## 2022-01-21 DIAGNOSIS — I51.89 DIASTOLIC DYSFUNCTION: ICD-10-CM

## 2022-01-21 DIAGNOSIS — Z23 IMMUNIZATION DUE: ICD-10-CM

## 2022-01-21 PROBLEM — R42 DIZZINESS OF UNKNOWN CAUSE: Status: RESOLVED | Noted: 2017-11-25 | Resolved: 2022-01-21

## 2022-01-21 PROCEDURE — G0008 ADMIN INFLUENZA VIRUS VAC: HCPCS | Mod: PBBFAC

## 2022-01-21 PROCEDURE — 99999 PR PBB SHADOW E&M-EST. PATIENT-LVL IV: ICD-10-PCS | Mod: PBBFAC,,, | Performed by: INTERNAL MEDICINE

## 2022-01-21 PROCEDURE — 99204 OFFICE O/P NEW MOD 45 MIN: CPT | Mod: S$PBB,,, | Performed by: INTERNAL MEDICINE

## 2022-01-21 PROCEDURE — 99214 OFFICE O/P EST MOD 30 MIN: CPT | Mod: PBBFAC | Performed by: INTERNAL MEDICINE

## 2022-01-21 PROCEDURE — 99999 PR PBB SHADOW E&M-EST. PATIENT-LVL IV: CPT | Mod: PBBFAC,,, | Performed by: INTERNAL MEDICINE

## 2022-01-21 PROCEDURE — 90694 VACC AIIV4 NO PRSRV 0.5ML IM: CPT | Mod: PBBFAC

## 2022-01-21 PROCEDURE — 99204 PR OFFICE/OUTPT VISIT, NEW, LEVL IV, 45-59 MIN: ICD-10-PCS | Mod: S$PBB,,, | Performed by: INTERNAL MEDICINE

## 2022-01-21 RX ORDER — IPRATROPIUM BROMIDE AND ALBUTEROL SULFATE 2.5; .5 MG/3ML; MG/3ML
3 SOLUTION RESPIRATORY (INHALATION) EVERY 6 HOURS PRN
Qty: 75 ML | Refills: 0 | Status: SHIPPED | OUTPATIENT
Start: 2022-01-21 | End: 2022-01-27

## 2022-01-21 NOTE — PROGRESS NOTES
Subjective:       Patient ID: Long Albarran is a 89 y.o. male.    Chief Complaint: Hospital Follow Up    Presents to the clinic with his daughter, who translates for the visit.     Went to [facility] ER on 1/6/22 for shortness of breath.  CXR did not show any acute abnormal findings. COVID and flu tests were negative. Treated in the ER with nebulized treatment and  Prednisone prescribed. He has been using an albuterol inhaler but does not feel it worked as well.     Shortness of Breath  This is a new problem. The current episode started 1 to 4 weeks ago. The problem occurs intermittently. The problem has been waxing and waning. Associated symptoms include wheezing. Pertinent negatives include no abdominal pain, chest pain, fever, leg swelling, sputum production or syncope. Nothing aggravates the symptoms. The patient has no known risk factors for DVT/PE. He has tried beta agonist inhalers for the symptoms. The treatment provided moderate (feels nebulized treatment worked better than hand held inhaler) relief. His past medical history is significant for a heart failure. There is no history of asthma, COPD or a recent surgery.     EKG on 1/6 done in the ER shows atrial fibrillation. He does not have a history of atrial fibrillation.     Echocardiogram done in 2017 showed diastolic dysfunction.     He has not been seen since 2017.     Lab Results   Component Value Date    WBC 4.22 01/07/2022    HGB 14.1 01/07/2022    HCT 40.3 01/07/2022     (L) 01/07/2022    CHOL 191 07/19/2017    TRIG 234 (H) 07/19/2017    HDL 36 (L) 07/19/2017    ALT 24 01/07/2022    AST 37 01/07/2022     01/07/2022    K 3.5 01/07/2022     01/07/2022    CREATININE 0.8 01/07/2022    BUN 16 01/07/2022    CO2 25 01/07/2022       Past Medical History:   Diagnosis Date    Diastolic dysfunction 12/2017    Hypertriglyceridemia      Past Surgical History:   Procedure Laterality Date    ABDOMINAL SURGERY      appendix removal in [place] 3  years ago    CATARACT EXTRACTION, BILATERAL       Family History   Problem Relation Age of Onset    Cancer Neg Hx     Diabetes Neg Hx     Heart disease Neg Hx     Hypertension Neg Hx     Kidney disease Neg Hx     Stroke Neg Hx      Review of Systems   Constitutional: Negative for activity change, fever and unexpected weight change.   Respiratory: Positive for shortness of breath and wheezing. Negative for cough, sputum production and chest tightness.    Cardiovascular: Negative for chest pain, leg swelling and syncope.   Gastrointestinal: Negative for abdominal pain.   Musculoskeletal: Negative for gait problem.   Neurological: Positive for dizziness (occasionally). Negative for syncope, speech difficulty and weakness.         Objective:      Physical Exam  Vitals reviewed.   Constitutional:       General: He is not in acute distress.     Appearance: He is well-developed and well-nourished. He is not ill-appearing.   Eyes:      General: No scleral icterus.  Cardiovascular:      Rate and Rhythm: Regular rhythm. Bradycardia present.      Heart sounds: Normal heart sounds.   Pulmonary:      Effort: Pulmonary effort is normal. No respiratory distress.      Breath sounds: Normal breath sounds. No wheezing or rales.   Abdominal:      General: Bowel sounds are normal.      Palpations: Abdomen is soft.   Musculoskeletal:      Right lower leg: No edema.      Left lower leg: No edema.   Skin:     General: Skin is warm and dry.   Neurological:      Mental Status: He is alert.   Psychiatric:         Mood and Affect: Mood and affect normal.         Behavior: Behavior normal.         Assessment:       Problem List Items Addressed This Visit     Diastolic dysfunction      Other Visit Diagnoses     Shortness of breath    -  Primary    Relevant Medications    albuterol-ipratropium (DUO-NEB) 2.5 mg-0.5 mg/3 mL nebulizer solution    Other Relevant Orders    Ambulatory referral/consult to Cardiology    NEBULIZER FOR HOME USE     Atrial fibrillation, unspecified type        Relevant Orders    Ambulatory referral/consult to Cardiology    Bradycardia        Relevant Orders    Ambulatory referral/consult to Cardiology    Immunization due        Relevant Orders    (In Office Administered) Pneumococcal Polysaccharide Vaccine (23 Valent) (SQ/IM) (Completed)    Influenza - Quadrivalent (Adjuvanted) (Completed)          Plan:       Long was seen today for hospital follow up.    Diagnoses and all orders for this visit:    Shortness of breath  -     Pulmonary vs cardiac etiology  -     Ambulatory referral/consult to Cardiology; Future  -     albuterol-ipratropium (DUO-NEB) 2.5 mg-0.5 mg/3 mL nebulizer solution; Take 3 mLs by nebulization every 6 (six) hours as needed for Wheezing or Shortness of Breath.  -     NEBULIZER FOR HOME USE    Atrial fibrillation, unspecified type  -     Ambulatory referral/consult to Cardiology; Future    Bradycardia  -     Ambulatory referral/consult to Cardiology; Future    Diastolic dysfunction  -     Recommend evaluation by cardiology    Immunization due  -     (In Office Administered) Pneumococcal Polysaccharide Vaccine (23 Valent) (SQ/IM)  -     Influenza - Quadrivalent (Adjuvanted)

## 2022-01-25 ENCOUNTER — TELEPHONE (OUTPATIENT)
Dept: INTERNAL MEDICINE | Facility: CLINIC | Age: 87
End: 2022-01-25
Payer: MEDICAID

## 2022-01-25 DIAGNOSIS — R06.02 SHORTNESS OF BREATH: ICD-10-CM

## 2022-01-25 NOTE — TELEPHONE ENCOUNTER
Son states pt's insurance does not cover the albuterol-ipratropium (DUO-NEB) 2.5 mg-0.5 mg/3 mL nebulizer solution .     Can we send something else in?

## 2022-01-26 ENCOUNTER — OFFICE VISIT (OUTPATIENT)
Dept: CARDIOLOGY | Facility: CLINIC | Age: 87
End: 2022-01-26
Payer: MEDICARE

## 2022-01-26 VITALS
OXYGEN SATURATION: 98 % | SYSTOLIC BLOOD PRESSURE: 140 MMHG | HEIGHT: 65 IN | DIASTOLIC BLOOD PRESSURE: 60 MMHG | BODY MASS INDEX: 23.18 KG/M2 | HEART RATE: 54 BPM | WEIGHT: 139.13 LBS

## 2022-01-26 DIAGNOSIS — I51.89 DIASTOLIC DYSFUNCTION: Primary | ICD-10-CM

## 2022-01-26 DIAGNOSIS — R00.1 BRADYCARDIA: ICD-10-CM

## 2022-01-26 DIAGNOSIS — R06.02 SHORTNESS OF BREATH: ICD-10-CM

## 2022-01-26 DIAGNOSIS — I48.91 ATRIAL FIBRILLATION, UNSPECIFIED TYPE: ICD-10-CM

## 2022-01-26 PROCEDURE — 99205 OFFICE O/P NEW HI 60 MIN: CPT | Mod: S$PBB,,, | Performed by: INTERNAL MEDICINE

## 2022-01-26 PROCEDURE — 99999 PR PBB SHADOW E&M-EST. PATIENT-LVL III: ICD-10-PCS | Mod: PBBFAC,,, | Performed by: INTERNAL MEDICINE

## 2022-01-26 PROCEDURE — 99213 OFFICE O/P EST LOW 20 MIN: CPT | Mod: PBBFAC | Performed by: INTERNAL MEDICINE

## 2022-01-26 PROCEDURE — 99205 PR OFFICE/OUTPT VISIT, NEW, LEVL V, 60-74 MIN: ICD-10-PCS | Mod: S$PBB,,, | Performed by: INTERNAL MEDICINE

## 2022-01-26 PROCEDURE — 99999 PR PBB SHADOW E&M-EST. PATIENT-LVL III: CPT | Mod: PBBFAC,,, | Performed by: INTERNAL MEDICINE

## 2022-01-26 RX ORDER — NAPROXEN SODIUM 220 MG/1
81 TABLET, FILM COATED ORAL DAILY
Status: DISCONTINUED | OUTPATIENT
Start: 2022-01-26 | End: 2022-03-16

## 2022-01-26 NOTE — PROGRESS NOTES
Subjective:   Patient ID:  Long Albarran is a 89 y.o. male who presents for follow-up of Shortness of Breath  Pt referred for SOB in ER. Pt with afib ? New. Pt denies CP.  EKG afib, LVH, t wave inversion anteriorly, 60 bpm.Pt denies edema.  Pt on ventolin. BNP 200s    Atrial Fibrillation  Presents for follow-up visit. Symptoms include shortness of breath. Symptoms are negative for bradycardia, chest pain, dizziness and palpitations. The symptoms have been stable. Past medical history includes atrial fibrillation. There are no medication compliance problems.   Shortness of Breath  This is a new problem. The current episode started 1 to 4 weeks ago. The problem occurs intermittently. The problem has been waxing and waning. Pertinent negatives include no chest pain or leg swelling. Nothing aggravates the symptoms. He has tried rest for the symptoms. The treatment provided mild relief.       Review of Systems   Constitutional: Negative. Negative for weight gain.   HENT: Negative.    Eyes: Negative.    Cardiovascular: Negative.  Negative for chest pain, leg swelling and palpitations.   Respiratory: Positive for shortness of breath. Negative for chest tightness.    Endocrine: Negative.    Hematologic/Lymphatic: Negative.    Skin: Negative.    Musculoskeletal: Negative for muscle weakness.   Gastrointestinal: Negative.    Genitourinary: Negative.    Neurological: Negative.  Negative for dizziness.   Psychiatric/Behavioral: Negative.    Allergic/Immunologic: Negative.      Family History   Problem Relation Age of Onset    Cancer Neg Hx     Diabetes Neg Hx     Heart disease Neg Hx     Hypertension Neg Hx     Kidney disease Neg Hx     Stroke Neg Hx      Past Medical History:   Diagnosis Date    Diastolic dysfunction 12/2017    Hypertriglyceridemia      Social History     Socioeconomic History    Marital status: Single   Tobacco Use    Smoking status: Former Smoker    Smokeless tobacco: Never Used   Substance and  Sexual Activity    Alcohol use: No     Alcohol/week: 0.0 standard drinks    Drug use: No     Current Outpatient Medications on File Prior to Visit   Medication Sig Dispense Refill    albuterol-ipratropium (DUO-NEB) 2.5 mg-0.5 mg/3 mL nebulizer solution Take 3 mLs by nebulization every 6 (six) hours as needed for Wheezing or Shortness of Breath. 75 mL 0     No current facility-administered medications on file prior to visit.     Review of patient's allergies indicates:  No Known Allergies    Objective:     Physical Exam  Vitals and nursing note reviewed.   Constitutional:       Appearance: He is well-developed and well-nourished.   HENT:      Head: Normocephalic and atraumatic.   Eyes:      Conjunctiva/sclera: Conjunctivae normal.      Pupils: Pupils are equal, round, and reactive to light.   Cardiovascular:      Rate and Rhythm: Normal rate. Rhythm irregular.      Pulses: Intact distal pulses.      Heart sounds: Normal heart sounds.   Pulmonary:      Effort: Pulmonary effort is normal.      Breath sounds: Normal breath sounds.   Abdominal:      General: Bowel sounds are normal.      Palpations: Abdomen is soft.   Musculoskeletal:      Cervical back: Normal range of motion and neck supple.   Skin:     General: Skin is warm and dry.   Neurological:      Mental Status: He is alert and oriented to person, place, and time.   Psychiatric:         Mood and Affect: Mood and affect normal.         Assessment:     1. Diastolic dysfunction    2. Shortness of breath    3. Atrial fibrillation, unspecified type    4. Bradycardia        Plan:     Diastolic dysfunction    Shortness of breath  -     Ambulatory referral/consult to Cardiology    Atrial fibrillation, unspecified type  -     Ambulatory referral/consult to Cardiology    Bradycardia  -     Ambulatory referral/consult to Cardiology      Recommend asa  echo

## 2022-01-27 RX ORDER — ALBUTEROL SULFATE 0.63 MG/3ML
0.63 SOLUTION RESPIRATORY (INHALATION) EVERY 6 HOURS PRN
Qty: 75 ML | Refills: 0 | Status: SHIPPED | OUTPATIENT
Start: 2022-01-27 | End: 2023-12-06 | Stop reason: SDUPTHER

## 2022-01-27 NOTE — TELEPHONE ENCOUNTER
I will send in a prescription for albuterol solution alone. If this is not covered patient/family will need to contact the insurance to see what is covered.

## 2022-02-17 ENCOUNTER — HOSPITAL ENCOUNTER (OUTPATIENT)
Dept: CARDIOLOGY | Facility: HOSPITAL | Age: 87
Discharge: HOME | End: 2022-02-17
Attending: INTERNAL MEDICINE
Payer: MEDICARE

## 2022-02-17 VITALS
DIASTOLIC BLOOD PRESSURE: 60 MMHG | HEIGHT: 65 IN | SYSTOLIC BLOOD PRESSURE: 140 MMHG | WEIGHT: 139 LBS | BODY MASS INDEX: 23.16 KG/M2

## 2022-02-17 DIAGNOSIS — I48.91 ATRIAL FIBRILLATION, UNSPECIFIED TYPE: ICD-10-CM

## 2022-02-17 LAB
AORTIC ROOT ANNULUS: 3.26 CM
AV INDEX (PROSTH): 0.85
AV MEAN GRADIENT: 3 MMHG
AV PEAK GRADIENT: 5 MMHG
AV VALVE AREA: 2.52 CM2
AV VELOCITY RATIO: 0.86
BSA FOR ECHO PROCEDURE: 1.7 M2
CV ECHO LV RWT: 0.58 CM
DOP CALC AO PEAK VEL: 1.09 M/S
DOP CALC AO VTI: 19.7 CM
DOP CALC LVOT AREA: 3 CM2
DOP CALC LVOT DIAMETER: 1.94 CM
DOP CALC LVOT PEAK VEL: 0.94 M/S
DOP CALC LVOT STROKE VOLUME: 49.63 CM3
DOP CALC RVOT PEAK VEL: 1.1 M/S
DOP CALC RVOT VTI: 22.3 CM
DOP CALCLVOT PEAK VEL VTI: 16.8 CM
E WAVE DECELERATION TIME: 178.57 MSEC
E/A RATIO: 1.07
E/E' RATIO: 7.79 M/S
ECHO EF ESTIMATED: 60 %
ECHO LV POSTERIOR WALL: 1.33 CM (ref 0.6–1.1)
EJECTION FRACTION: 55 %
FRACTIONAL SHORTENING: 32 % (ref 28–44)
INTERVENTRICULAR SEPTUM: 1.4 CM (ref 0.6–1.1)
IVC DIAMETER: 2.06 CM
LA MAJOR: 6.68 CM
LA MINOR: 6.06 CM
LA WIDTH: 3.54 CM
LEFT ATRIUM SIZE: 4.47 CM
LEFT ATRIUM VOLUME INDEX MOD: 36.2 ML/M2
LEFT ATRIUM VOLUME INDEX: 50.6 ML/M2
LEFT ATRIUM VOLUME MOD: 61.13 CM3
LEFT ATRIUM VOLUME: 85.48 CM3
LEFT INTERNAL DIMENSION IN SYSTOLE: 3.09 CM (ref 2.1–4)
LEFT VENTRICLE DIASTOLIC VOLUME INDEX: 56.17 ML/M2
LEFT VENTRICLE DIASTOLIC VOLUME: 94.93 ML
LEFT VENTRICLE MASS INDEX: 144 G/M2
LEFT VENTRICLE SYSTOLIC VOLUME INDEX: 22.3 ML/M2
LEFT VENTRICLE SYSTOLIC VOLUME: 37.61 ML
LEFT VENTRICULAR INTERNAL DIMENSION IN DIASTOLE: 4.55 CM (ref 3.5–6)
LEFT VENTRICULAR MASS: 243.24 G
LV LATERAL E/E' RATIO: 7.4 M/S
LV SEPTAL E/E' RATIO: 8.22 M/S
LVOT MG: 1.9 MMHG
LVOT MV: 0.65 CM/S
MV PEAK A VEL: 0.69 M/S
MV PEAK E VEL: 0.74 M/S
MV STENOSIS PRESSURE HALF TIME: 51.78 MS
MV VALVE AREA P 1/2 METHOD: 4.25 CM2
PISA TR MAX VEL: 3.12 M/S
PV MEAN GRADIENT: 1.97 MMHG
PV PEAK VELOCITY: 0.88 CM/S
RA MAJOR: 5.33 CM
RA PRESSURE: 3 MMHG
RA WIDTH: 3.22 CM
RIGHT VENTRICULAR END-DIASTOLIC DIMENSION: 4.18 CM
SINUS: 3.2 CM
STJ: 3.21 CM
TDI LATERAL: 0.1 M/S
TDI SEPTAL: 0.09 M/S
TDI: 0.1 M/S
TR MAX PG: 39 MMHG
TRICUSPID ANNULAR PLANE SYSTOLIC EXCURSION: 1.38 CM
TV REST PULMONARY ARTERY PRESSURE: 42 MMHG

## 2022-02-17 PROCEDURE — 93306 TTE W/DOPPLER COMPLETE: CPT

## 2022-02-17 PROCEDURE — 93306 TTE W/DOPPLER COMPLETE: CPT | Mod: 26,,, | Performed by: INTERNAL MEDICINE

## 2022-02-17 PROCEDURE — 93306 ECHO (CUPID ONLY): ICD-10-PCS | Mod: 26,,, | Performed by: INTERNAL MEDICINE

## 2022-02-18 ENCOUNTER — TELEPHONE (OUTPATIENT)
Dept: INTERNAL MEDICINE | Facility: CLINIC | Age: 87
End: 2022-02-18
Payer: MEDICARE

## 2022-02-18 DIAGNOSIS — J20.9 ACUTE BRONCHITIS, UNSPECIFIED ORGANISM: Primary | ICD-10-CM

## 2022-02-18 NOTE — TELEPHONE ENCOUNTER
DME is requesting a order be added for pt with reason for home use with nebulizer machine. Per pt daughter.  Pt lov was 1/12/22

## 2022-02-18 NOTE — TELEPHONE ENCOUNTER
Called and spoke with [facility] at [phone] medicare does not cover sob dx code. It has to be copd, bronchitis, asthma for the nebulizer machine to be covered. Will need new order with new dx code.

## 2022-02-18 NOTE — TELEPHONE ENCOUNTER
----- Message from Carlota Cardenas sent at 2/18/2022 12:15 PM CST -----  Contact: [phone]  Patient daughter is calling in requesting a call back regarding the breathing machine.please call keshav rodgers at [phone]

## 2022-02-21 ENCOUNTER — TELEPHONE (OUTPATIENT)
Dept: CARDIOLOGY | Facility: CLINIC | Age: 87
End: 2022-02-21
Payer: MEDICARE

## 2022-02-21 NOTE — TELEPHONE ENCOUNTER
----- Message from Herbert Guzman MD sent at 2/21/2022  4:27 AM CST -----  Please tell pt:    Echo shows normal systolic function

## 2022-02-21 NOTE — TELEPHONE ENCOUNTER
The patients daughter has been notified of this information and all questions answered.  Echo shows normal systolic function. Marino verbalized understanding.

## 2022-03-07 DIAGNOSIS — R06.02 SHORTNESS OF BREATH: ICD-10-CM

## 2022-03-07 NOTE — TELEPHONE ENCOUNTER
No new care gaps identified.  Powered by [id number]. Reference number: [id number].   3/07/2022 3:20:31 AM CST

## 2022-03-14 NOTE — TELEPHONE ENCOUNTER
Refill Routing Note   Medication(s) are not appropriate for processing by [facility] for the following reason(s):      - Medication is a new start (<3 months)    [facility] action(s):  Defer          --->Care Gap information included in message below if applicable.   Medication reconciliation completed: No   Automatic Epic Generated Protocol Data:        Requested Prescriptions   Pending Prescriptions Disp Refills    albuterol-ipratropium (DUO-NEB) 2.5 mg-0.5 mg/3 mL nebulizer solution [Pharmacy Med Name: IPRATROPI/ALB 0.5/3MG INH SL 60X3ML] 180 mL 3     Sig: INHALE 1 VIAL BY NEBULIZATION EVERY 6 HOURS AS NEEDED WHEEZING/SHORTNESS OF BREATH       Pulmonology:  Beta Agonists Passed - 3/14/2022  9:55 AM        Passed - Patient is at least 18 years old        Passed - Last Heart Rate in normal range within 360 days     Pulse Readings from Last 1 Encounters:   01/26/22 (!) 54              Passed - Valid encounter within last 15 months     Recent Visits  Date Type Provider Dept   01/21/22 Office Visit Penny Watson DO On Internal Medicine   Showing recent visits within past 720 days and meeting all other requirements  Future Appointments  No visits were found meeting these conditions.  Showing future appointments within next 150 days and meeting all other requirements      Future Appointments              In 2 days Herbert Guzman MD [facility] - Cardiology,  Medical                       Appointments  past 12m or future 3m with PCP    Date Provider   Last Visit   1/21/2022 Penny Watson DO   Next Visit   Visit date not found Penny Watson DO   ED visits in past 90 days: 1        Note composed:10:42 AM 03/14/2022

## 2022-03-15 RX ORDER — IPRATROPIUM BROMIDE AND ALBUTEROL SULFATE 2.5; .5 MG/3ML; MG/3ML
SOLUTION RESPIRATORY (INHALATION)
Qty: 180 ML | Refills: 3 | OUTPATIENT
Start: 2022-03-15

## 2022-03-15 NOTE — TELEPHONE ENCOUNTER
Quick DC. Inappropriate Request    Refill Authorization Note   Long Hines  is requesting a refill authorization.  Brief Assessment and Rationale for Refill:  Quick Discontinue  Medication Therapy Plan:  Pt transitioned to albuterol nebulizer solution    Medication Reconciliation Completed:  No      Comments:   Pended Medication(s)       Requested Prescriptions     Refused Prescriptions Disp Refills    albuterol-ipratropium (DUO-NEB) 2.5 mg-0.5 mg/3 mL nebulizer solution [Pharmacy Med Name: IPRATROPI/ALB 0.5/3MG INH SL 60X3ML] 180 mL 3     Sig: INHALE 1 VIAL BY NEBULIZATION EVERY 6 HOURS AS NEEDED WHEEZING/SHORTNESS OF BREATH     Refused By: KELY ZHOU     Reason for Refusal: Refill not appropriate        Duplicate Pended Encounter(s)/ Last Prescribed Details: (includes pharmacy & prescriber details)   albuterol-ipratropium (DUO-NEB) 2.5 mg-0.5 mg/3 mL nebulizer solution [[id number]]  DISCONTINUED    Order Details  Dose: 3 mL Route: Nebulization Frequency: Every 6 hours PRN for Wheezing, Shortness of Breath   Dispense Quantity: 75 mL Refills: 0          Sig: Take 3 mLs by nebulization every 6 (six) hours as needed for Wheezing or Shortness of Breath.         Start Date: 01/21/22 End Date: 01/27/22   Discontinued by: Penny Watson DO on 1/27/2022 12:18   Reason: Cost of medication                Note composed:6:32 PM 03/15/2022

## 2022-03-16 ENCOUNTER — OFFICE VISIT (OUTPATIENT)
Dept: CARDIOLOGY | Facility: CLINIC | Age: 87
End: 2022-03-16
Payer: MEDICARE

## 2022-03-16 VITALS
WEIGHT: 140.19 LBS | BODY MASS INDEX: 24.84 KG/M2 | HEIGHT: 63 IN | SYSTOLIC BLOOD PRESSURE: 122 MMHG | HEART RATE: 60 BPM | DIASTOLIC BLOOD PRESSURE: 76 MMHG

## 2022-03-16 DIAGNOSIS — R06.02 SHORTNESS OF BREATH: ICD-10-CM

## 2022-03-16 DIAGNOSIS — R00.1 BRADYCARDIA: ICD-10-CM

## 2022-03-16 DIAGNOSIS — I51.89 DIASTOLIC DYSFUNCTION: Primary | ICD-10-CM

## 2022-03-16 DIAGNOSIS — I48.0 PAROXYSMAL ATRIAL FIBRILLATION: ICD-10-CM

## 2022-03-16 PROCEDURE — 99214 PR OFFICE/OUTPT VISIT, EST, LEVL IV, 30-39 MIN: ICD-10-PCS | Mod: S$GLB,,, | Performed by: INTERNAL MEDICINE

## 2022-03-16 PROCEDURE — 99499 RISK ADDL DX/OHS AUDIT: ICD-10-PCS | Mod: S$GLB,,, | Performed by: INTERNAL MEDICINE

## 2022-03-16 PROCEDURE — 99213 OFFICE O/P EST LOW 20 MIN: CPT | Mod: PBBFAC | Performed by: INTERNAL MEDICINE

## 2022-03-16 PROCEDURE — 99499 UNLISTED E&M SERVICE: CPT | Mod: S$GLB,,, | Performed by: INTERNAL MEDICINE

## 2022-03-16 PROCEDURE — 99999 PR PBB SHADOW E&M-EST. PATIENT-LVL III: CPT | Mod: PBBFAC,,, | Performed by: INTERNAL MEDICINE

## 2022-03-16 PROCEDURE — 99999 PR PBB SHADOW E&M-EST. PATIENT-LVL III: ICD-10-PCS | Mod: PBBFAC,,, | Performed by: INTERNAL MEDICINE

## 2022-03-16 PROCEDURE — 99214 OFFICE O/P EST MOD 30 MIN: CPT | Mod: S$GLB,,, | Performed by: INTERNAL MEDICINE

## 2022-03-16 RX ORDER — IPRATROPIUM BROMIDE AND ALBUTEROL SULFATE 2.5; .5 MG/3ML; MG/3ML
SOLUTION RESPIRATORY (INHALATION)
COMMUNITY
Start: 2022-02-06 | End: 2022-08-30

## 2022-03-16 RX ORDER — NAPROXEN SODIUM 220 MG/1
81 TABLET, FILM COATED ORAL DAILY
Status: ACTIVE | OUTPATIENT
Start: 2022-03-16

## 2022-03-16 NOTE — PROGRESS NOTES
Subjective:   Patient ID:  Long Albarran is a 89 y.o. male who presents for follow-up of diastolic dysfunction  echo - nml EF, mild pulmonary HTN  Patient denies CP, angina or anginal equivalent.    Atrial Fibrillation  Presents for follow-up visit. Symptoms are negative for bradycardia, chest pain, dizziness, palpitations and shortness of breath. The symptoms have been stable. Past medical history includes atrial fibrillation. There are no medication compliance problems.   Shortness of Breath  This is a new problem. The current episode started 1 to 4 weeks ago. The problem occurs intermittently. The problem has been waxing and waning. Pertinent negatives include no chest pain or leg swelling. Nothing aggravates the symptoms. He has tried rest for the symptoms. The treatment provided mild relief.       Review of Systems   Constitutional: Negative. Negative for weight gain.   HENT: Negative.    Eyes: Negative.    Cardiovascular: Negative.  Negative for chest pain, leg swelling and palpitations.   Respiratory: Negative.  Negative for shortness of breath.    Endocrine: Negative.    Hematologic/Lymphatic: Negative.    Skin: Negative.    Musculoskeletal: Negative for muscle weakness.   Gastrointestinal: Negative.    Genitourinary: Negative.    Neurological: Negative.  Negative for dizziness.   Psychiatric/Behavioral: Negative.    Allergic/Immunologic: Negative.      Family History   Problem Relation Age of Onset    Cancer Neg Hx     Diabetes Neg Hx     Heart disease Neg Hx     Hypertension Neg Hx     Kidney disease Neg Hx     Stroke Neg Hx      Past Medical History:   Diagnosis Date    Diastolic dysfunction 12/2017    Hypertriglyceridemia      Social History     Socioeconomic History    Marital status: Single   Tobacco Use    Smoking status: Former Smoker    Smokeless tobacco: Never Used   Substance and Sexual Activity    Alcohol use: No     Alcohol/week: 0.0 standard drinks    Drug use: No     Current  Outpatient Medications on File Prior to Visit   Medication Sig Dispense Refill    albuterol (ACCUNEB) 0.63 mg/3 mL Nebu Take 3 mLs (0.63 mg total) by nebulization every 6 (six) hours as needed (wheezing or shortness of breath). 75 mL 0    albuterol-ipratropium (DUO-NEB) 2.5 mg-0.5 mg/3 mL nebulizer solution       nebulizer and compressor Alisa use as directed 1 each 0     Current Facility-Administered Medications on File Prior to Visit   Medication Dose Route Frequency Provider Last Rate Last Admin    [DISCONTINUED] aspirin chewable tablet 81 mg  81 mg Oral Daily Herbert Guzman MD         Review of patient's allergies indicates:  No Known Allergies    Objective:     Physical Exam  Vitals and nursing note reviewed.   Constitutional:       Appearance: He is well-developed.   HENT:      Head: Normocephalic and atraumatic.   Eyes:      Conjunctiva/sclera: Conjunctivae normal.      Pupils: Pupils are equal, round, and reactive to light.   Cardiovascular:      Rate and Rhythm: Normal rate and regular rhythm.      Pulses: Intact distal pulses.      Heart sounds: Normal heart sounds.   Pulmonary:      Effort: Pulmonary effort is normal.      Breath sounds: Normal breath sounds.   Abdominal:      General: Bowel sounds are normal.      Palpations: Abdomen is soft.   Musculoskeletal:      Cervical back: Normal range of motion and neck supple.   Skin:     General: Skin is warm and dry.   Neurological:      Mental Status: He is alert and oriented to person, place, and time.         Assessment:     1. Diastolic dysfunction    2. Paroxysmal atrial fibrillation    3. Shortness of breath    4. Bradycardia        Plan:     Diastolic dysfunction    Paroxysmal atrial fibrillation    Shortness of breath    Bradycardia      Asa- PAF  Continue risk factor modification

## 2022-05-31 ENCOUNTER — HOSPITAL ENCOUNTER (EMERGENCY)
Facility: HOSPITAL | Age: 87
Discharge: HOME | End: 2022-05-31
Attending: EMERGENCY MEDICINE
Payer: MEDICAID

## 2022-05-31 ENCOUNTER — TELEPHONE (OUTPATIENT)
Dept: EMERGENCY MEDICINE | Facility: HOSPITAL | Age: 87
End: 2022-05-31
Payer: MEDICAID

## 2022-05-31 VITALS
TEMPERATURE: 98 F | SYSTOLIC BLOOD PRESSURE: 134 MMHG | WEIGHT: 134.69 LBS | BODY MASS INDEX: 23.86 KG/M2 | RESPIRATION RATE: 20 BRPM | HEART RATE: 65 BPM | OXYGEN SATURATION: 95 % | DIASTOLIC BLOOD PRESSURE: 62 MMHG

## 2022-05-31 DIAGNOSIS — N30.01 ACUTE CYSTITIS WITH HEMATURIA: Primary | ICD-10-CM

## 2022-05-31 LAB
BACTERIA #/AREA URNS HPF: ABNORMAL /HPF
BILIRUB UR QL STRIP: NEGATIVE
CLARITY UR: CLEAR
COLOR UR: YELLOW
GLUCOSE UR QL STRIP: NEGATIVE
HGB UR QL STRIP: ABNORMAL
HYALINE CASTS #/AREA URNS LPF: 0 /LPF
KETONES UR QL STRIP: ABNORMAL
LEUKOCYTE ESTERASE UR QL STRIP: ABNORMAL
MICROSCOPIC COMMENT: ABNORMAL
NITRITE UR QL STRIP: POSITIVE
PH UR STRIP: 5 [PH] (ref 5–8)
PROT UR QL STRIP: ABNORMAL
RBC #/AREA URNS HPF: >100 /HPF (ref 0–4)
SP GR UR STRIP: 1.02 (ref 1–1.03)
URN SPEC COLLECT METH UR: ABNORMAL
UROBILINOGEN UR STRIP-ACNC: ABNORMAL EU/DL
WBC #/AREA URNS HPF: 6 /HPF (ref 0–5)

## 2022-05-31 PROCEDURE — 81000 URINALYSIS NONAUTO W/SCOPE: CPT | Performed by: EMERGENCY MEDICINE

## 2022-05-31 PROCEDURE — 99284 EMERGENCY DEPT VISIT MOD MDM: CPT | Mod: 25

## 2022-05-31 RX ORDER — CEFUROXIME AXETIL 500 MG/1
500 TABLET ORAL 2 TIMES DAILY
Qty: 20 TABLET | Refills: 0 | Status: SHIPPED | OUTPATIENT
Start: 2022-05-31 | End: 2022-06-10

## 2022-05-31 RX ORDER — CEFUROXIME AXETIL 500 MG/1
500 TABLET ORAL 2 TIMES DAILY
Qty: 20 TABLET | Refills: 0 | Status: SHIPPED | OUTPATIENT
Start: 2022-05-31 | End: 2022-05-31 | Stop reason: SDUPTHER

## 2022-05-31 NOTE — ED NOTES
Patient placed on continuous pulse ox and blood pressure monitor. Call light within reach of patient.

## 2022-05-31 NOTE — ED PROVIDER NOTES
SCRIBE #1 NOTE: I, Zari Roger, am scribing for, and in the presence of, Christian Parker MD. I have scribed the entire note.      History      Chief Complaint   Patient presents with    Hematuria     Blood in urine, since 2am yesterday, no pain with urination, minor pelvic pain per patient       Review of patient's allergies indicates:  No Known Allergies     HPI   HPI    5/31/2022, 9:08 AM   History obtained from the patient      History of Present Illness: Long Albarran is a 89 y.o. male patient with a PMHx of diastolic dysfunction and hypertriglyceridemia who presents to the Emergency Department for hematuria which onset suddenly yesterday. Symptoms are constant and moderate in severity. No mitigating or exacerbating factors reported. Associated sxs include pelvic pain. Patient denies any dysuria, fever, chills, N/V/D, difficulty urinating, weakness, and all other sxs at this time. No prior Tx reported. No further complaints or concerns at this time.         Arrival mode: Personal vehicle      PCP: Penny Watson DO       Past Medical History:  Past Medical History:   Diagnosis Date    Diastolic dysfunction 12/2017    Hypertriglyceridemia        Past Surgical History:  Past Surgical History:   Procedure Laterality Date    ABDOMINAL SURGERY      appendix removal in [place] 3 years ago    CATARACT EXTRACTION, BILATERAL           Family History:  Family History   Problem Relation Age of Onset    Cancer Neg Hx     Diabetes Neg Hx     Heart disease Neg Hx     Hypertension Neg Hx     Kidney disease Neg Hx     Stroke Neg Hx        Social History:  Social History     Tobacco Use    Smoking status: Former Smoker    Smokeless tobacco: Never Used   Substance and Sexual Activity    Alcohol use: No     Alcohol/week: 0.0 standard drinks    Drug use: No    Sexual activity: Not on file       ROS   Review of Systems   Constitutional: Negative for chills and fever.   HENT: Negative for sore throat.     Respiratory: Negative for shortness of breath.    Cardiovascular: Negative for chest pain.   Gastrointestinal: Negative for diarrhea, nausea and vomiting.   Genitourinary: Positive for hematuria. Negative for difficulty urinating and dysuria.        (+) pelvic pain   Musculoskeletal: Negative for back pain.   Skin: Negative for rash.   Neurological: Negative for weakness.   Hematological: Does not bruise/bleed easily.   All other systems reviewed and are negative.    Physical Exam      Initial Vitals [05/31/22 0858]   BP Pulse Resp Temp SpO2   (!) 150/61 63 20 98.2 °F (36.8 °C) 97 %      MAP       --          Physical Exam  Nursing Notes and Vital Signs Reviewed.  Constitutional: Patient is in no acute distress. Elderly.  Head: Atraumatic. Normocephalic.  Eyes: PERRL. EOM intact. Conjunctivae are not pale. No scleral icterus.  ENT: Mucous membranes are moist. Oropharynx is clear and symmetric.    Neck: Supple. Full ROM. No lymphadenopathy.  Cardiovascular: Regular rate. Regular rhythm. No murmurs, rubs, or gallops. Distal pulses are 2+ and symmetric.  Pulmonary/Chest: No respiratory distress. Clear to auscultation bilaterally. No wheezing or rales.  Abdominal: Soft and non-distended.  There is no tenderness.  No rebound, guarding, or rigidity.   Musculoskeletal: Moves all extremities. No obvious deformities. No edema.  Skin: Warm and dry.  Neurological:  Alert, awake, and appropriate.  Normal speech.  No acute focal neurological deficits are appreciated.  Psychiatric: Normal affect. Good eye contact. Appropriate in content.    ED Course    Procedures  ED Vital Signs:  Vitals:    05/31/22 0858 05/31/22 0918 05/31/22 1029 05/31/22 1030   BP: (!) 150/61 (!) 167/70  134/62   Pulse: 63 60 65    Resp: 20      Temp: 98.2 °F (36.8 °C)      TempSrc: Oral      SpO2: 97% 99%  95%   Weight: 61.1 kg (134 lb 11.2 oz)          Abnormal Lab Results:  Labs Reviewed   URINALYSIS, REFLEX TO URINE CULTURE - Abnormal; Notable for the  following components:       Result Value    Protein, UA 3+ (*)     Ketones, UA 1+ (*)     Occult Blood UA 3+ (*)     Nitrite, UA Positive (*)     Urobilinogen, UA 2.0-3.0 (*)     Leukocytes, UA 2+ (*)     All other components within normal limits    Narrative:     Specimen Source->Urine   URINALYSIS MICROSCOPIC - Abnormal; Notable for the following components:    RBC, UA >100 (*)     WBC, UA 6 (*)     Bacteria Few (*)     All other components within normal limits    Narrative:     Specimen Source->Urine        All Lab Results:  Results for orders placed or performed during the hospital encounter of 05/31/22   Urinalysis, Reflex to Urine Culture Urine, Clean Catch    Specimen: Urine   Result Value Ref Range    Specimen UA Urine, Clean Catch     Color, UA Yellow Yellow, Straw, Francisca    Appearance, UA Clear Clear    pH, UA 5.0 5.0 - 8.0    Specific Gravity, UA 1.020 1.005 - 1.030    Protein, UA 3+ (A) Negative    Glucose, UA Negative Negative    Ketones, UA 1+ (A) Negative    Bilirubin (UA) Negative Negative    Occult Blood UA 3+ (A) Negative    Nitrite, UA Positive (A) Negative    Urobilinogen, UA 2.0-3.0 (A) <2.0 EU/dL    Leukocytes, UA 2+ (A) Negative   Urinalysis Microscopic   Result Value Ref Range    RBC, UA >100 (H) 0 - 4 /hpf    WBC, UA 6 (H) 0 - 5 /hpf    Bacteria Few (A) None-Occ /hpf    Hyaline Casts, UA 0 0-1/lpf /lpf    Microscopic Comment SEE COMMENT          Imaging Results:  Imaging Results          CT Renal Stone Study ABD Pelvis WO (Final result)  Result time 05/31/22 10:16:03    Final result by Andi Griffin MD (05/31/22 10:16:03)                 Impression:      1.  Motion artifact is present on a number of images.  Subtle abnormalities could be overlooked.    2.  Small bilateral pleural effusions with left lower lobe scarring versus atelectasis/consolidation.  Inferior middle lobe and lingular scarring or atelectasis.  Early infectious process difficult to exclude in the right clinical  setting.    3.  Negative for obvious acute process otherwise.  Negative for inflammatory changes.  Negative for free air.  Negative for renal stone disease or hydronephrosis.    4. 7 mm pleural based nodule within the lateral right lower lobe.  For a solid nodule 6-8 mm, Fleischner Society 2017 guidelines recommend follow up with non-contrast chest CT at 6-12 months and 18-24 months after discovery.    5.  Coronary artery calcifications.  Mitral valve plane versus mitral annular calcifications.  2.7 cm superior pole right renal cyst.  Fatty infiltration of the liver and the pancreas.  Vascular calcifications without aneurysmal change.  Small fat filled umbilical hernia.  Buttock injection granulomas.  Other nonemergent findings as described above.    All CT scans at this facility are performed  using dose modulation techniques as appropriate to performed exam including the following:  automated exposure control; adjustment of mA and/or kV according to the patients size (this includes techniques or standardized protocols for targeted exams where dose is matched to indication/reason for exam: i.e. extremities or head);  iterative reconstruction technique.      Electronically signed by: Andi Griffin MD  Date:    05/31/2022  Time:    10:16             Narrative:    EXAMINATION:  CT RENAL STONE STUDY ABD PELVIS WO, multiplanar reconstructions    CLINICAL HISTORY:  Flank pain, kidney stone suspected;    TECHNIQUE:  Axial images through the abdomen and pelvis were obtained without the use of IV contrast.  Sagittal and coronal reconstructions are provided for review.  Oral contrast was not utilized.  Motion artifact is present on a number of images.  Subtle abnormalities could be overlooked.    COMPARISON:  None    FINDINGS:  LUNG BASES: Small effusions.  Left lower lobe atelectasis/consolidation.  7 mm pleural based nodule within the lateral right lower lobe on image 6 of series 2 with adjacent calcifications.  Inferior  middle lobe and lingular scarring or atelectasis.  Lung bases are otherwise clear.  Negative for pleural or pericardial effusions. The distal esophagus is normal.  Mitral valve plane are mitral annular calcifications.  Coronary artery calcifications.    ABDOMEN: Fatty infiltration of the liver and the pancreas.  Medial right upper pole 2.7 cm cyst.  Calcified granuloma within the spleen.  The liver, spleen and gallbladder otherwise appear normal.  The pancreas is otherwise.  Kidneys and adrenal glands are otherwise normal.    Negative for adenopathy, free fluid or inflammatory change noted within the abdomen or pelvis.  Vascular calcifications are present without aneurysmal changes.    The bowel appears normal. I do not see a normal or an abnormal appendix.  Negative for free air.    PELVIS: The urinary bladder is contracted.   The male pelvic organs are normal. There are pelvic phleboliths.    Tiny fat filled umbilical hernia.  The abdominal wall is otherwise intact.    No significant osseous abnormality is identified.  Negative for significant spinal canal stenosis. Marked anterior wedging of the T11 vertebral body and mild anterior wedging T10 and T12 vertebral bodies.  Marked osteopenia.  Multilevel marginal spondylosis and degenerative facet arthropathy.  Mild degenerative changes of the pelvis.    Negative for groin adenopathy.    Bilateral buttock injection granulomas.                                        The Emergency Provider reviewed the vital signs and test results, which are outlined above.    ED Discussion     10:28 AM: Reassessed pt at this time. Discussed with pt all pertinent ED information and results. Discussed pt dx and plan of tx. Gave pt all f/u and return to the ED instructions. All questions and concerns were addressed at this time. Pt expresses understanding of information and instructions, and is comfortable with plan to discharge. Pt is stable for discharge.    I discussed with patient  and/or family/caretaker that evaluation in the ED does not suggest any emergent or life threatening medical conditions requiring immediate intervention beyond what was provided in the ED, and I believe patient is safe for discharge.  Regardless, an unremarkable evaluation in the ED does not preclude the development or presence of a serious of life threatening condition. As such, patient was instructed to return immediately for any worsening or change in current symptoms.           ED Medication(s):  Medications - No data to display     Follow-up Information     Penny Watson DO.    Specialty: Internal Medicine  Contact information:  [address] [place] [address] [address] [address]  [phone]                         Discharge Medication List as of 5/31/2022 10:28 AM      START taking these medications    Details   cefUROXime (CEFTIN) 500 MG tablet Take 1 tablet (500 mg total) by mouth 2 (two) times daily. for 10 days, Starting Tue 5/31/2022, Until Fri 6/10/2022, Normal              Medication List      START taking these medications    cefUROXime 500 MG tablet  Commonly known as: CEFTIN  Take 1 tablet (500 mg total) by mouth 2 (two) times daily. for 10 days        ASK your doctor about these medications    albuterol 0.63 mg/3 mL Nebu  Commonly known as: ACCUNEB  Take 3 mLs (0.63 mg total) by nebulization every 6 (six) hours as needed (wheezing or shortness of breath).     albuterol-ipratropium 2.5 mg-0.5 mg/3 mL nebulizer solution  Commonly known as: DUO-NEB     COMP-AIR NEBULIZER COMPRESSOR Alisa  Generic drug: nebulizer and compressor  use as directed           Where to Get Your Medications      These medications were sent to [id number] - [place] - [address] [address]    Phone: [phone]   · cefUROXime 500 MG tablet             Medical Decision Making    Medical Decision Making:   Clinical Tests:   Lab Tests: Ordered and  Reviewed  Radiological Study: Ordered and Reviewed           Scribe Attestation:   Scribe #1: I performed the above scribed service and the documentation accurately describes the services I performed. I attest to the accuracy of the note.    Attending:   Physician Attestation Statement for Scribe #1: I, Christian Parker MD, personally performed the services described in this documentation, as scribed by Zari Roger, in my presence, and it is both accurate and complete.          Clinical Impression       ICD-10-CM ICD-9-CM   1. Acute cystitis with hematuria  N30.01 595.0       Disposition:   Disposition: Discharged  Condition: Stable         Christian Parker MD  05/31/22 1049

## 2022-08-23 ENCOUNTER — TELEPHONE (OUTPATIENT)
Dept: ADMINISTRATIVE | Facility: HOSPITAL | Age: 87
End: 2022-08-23
Payer: MEDICARE

## 2022-08-26 ENCOUNTER — TELEPHONE (OUTPATIENT)
Dept: INTERNAL MEDICINE | Facility: CLINIC | Age: 87
End: 2022-08-26
Payer: MEDICARE

## 2022-08-26 NOTE — TELEPHONE ENCOUNTER
"Unable to reach Mr Hines or his daughter to confirm AWV with Madiha Vazquez NP scheduled for 8/29/22. There was no voicemail and Mr Hines's phone number "is not accepting calls at this time".   "

## 2022-08-29 PROBLEM — I70.0 ATHEROSCLEROSIS OF AORTA: Status: ACTIVE | Noted: 2022-08-29

## 2022-08-29 PROBLEM — I27.9 PULMONARY HEART DISEASE: Status: ACTIVE | Noted: 2022-08-29

## 2022-08-29 PROBLEM — E78.89 LIPIDS ABNORMAL: Status: ACTIVE | Noted: 2022-08-29

## 2022-09-16 ENCOUNTER — PES CALL (OUTPATIENT)
Dept: ADMINISTRATIVE | Facility: CLINIC | Age: 87
End: 2022-09-16
Payer: MEDICARE

## 2022-11-18 ENCOUNTER — TELEPHONE (OUTPATIENT)
Dept: INTERNAL MEDICINE | Facility: CLINIC | Age: 87
End: 2022-11-18
Payer: MEDICARE

## 2022-11-18 NOTE — TELEPHONE ENCOUNTER
Daughter came by her sister liver in [place] and she needs a letter stating her father has asthma and is elderly so she can come visit her father

## 2022-11-18 NOTE — LETTER
December 2, 2022    Long Albarran  [address] [facility] [address]             [facility]  [address] [place] [address]  Phone: [phone]  Fax: [phone] To whom it may concern:     Long Hines is currently a patient under my care. He is well known to me. He has multiple chronic medical conditions including atrial fibrillation, diastolic dysfunction and shortness of breath. He is elderly.     If you have any questions or concerns, do not hesitate to contact my office.     Sincerely,       Penny Watson D.O.   Internal Medicine Physician

## 2022-11-25 NOTE — TELEPHONE ENCOUNTER
I could not understand either she said they do not lie to their country so I guess she needs a letter to back up what they tell her country??

## 2023-01-30 ENCOUNTER — PES CALL (OUTPATIENT)
Dept: ADMINISTRATIVE | Facility: OTHER | Age: 88
End: 2023-01-30
Payer: MEDICARE

## 2023-04-17 ENCOUNTER — TELEPHONE (OUTPATIENT)
Dept: INTERNAL MEDICINE | Facility: CLINIC | Age: 88
End: 2023-04-17
Payer: MEDICARE

## 2023-04-17 NOTE — TELEPHONE ENCOUNTER
----- Message from Zia Quiroga sent at 4/17/2023  3:38 PM CDT -----  Contact: Long Alves is needing a call back in regards to having a a refill for the nebulizer solution and a bigger machine. Please give him a call back at [phone]

## 2023-04-19 ENCOUNTER — TELEPHONE (OUTPATIENT)
Dept: INTERNAL MEDICINE | Facility: CLINIC | Age: 88
End: 2023-04-19

## 2023-04-19 ENCOUNTER — HOSPITAL ENCOUNTER (OUTPATIENT)
Dept: RADIOLOGY | Facility: HOSPITAL | Age: 88
Discharge: HOME | End: 2023-04-19
Payer: MEDICARE

## 2023-04-19 ENCOUNTER — OFFICE VISIT (OUTPATIENT)
Dept: INTERNAL MEDICINE | Facility: CLINIC | Age: 88
End: 2023-04-19
Payer: MEDICARE

## 2023-04-19 VITALS
DIASTOLIC BLOOD PRESSURE: 64 MMHG | WEIGHT: 137.13 LBS | HEIGHT: 63 IN | OXYGEN SATURATION: 96 % | HEART RATE: 66 BPM | SYSTOLIC BLOOD PRESSURE: 130 MMHG | TEMPERATURE: 97 F | BODY MASS INDEX: 24.3 KG/M2

## 2023-04-19 DIAGNOSIS — R06.02 SHORTNESS OF BREATH: ICD-10-CM

## 2023-04-19 DIAGNOSIS — I27.9 PULMONARY HEART DISEASE: ICD-10-CM

## 2023-04-19 DIAGNOSIS — I48.0 PAROXYSMAL ATRIAL FIBRILLATION: ICD-10-CM

## 2023-04-19 DIAGNOSIS — J90 PLEURAL EFFUSION: ICD-10-CM

## 2023-04-19 DIAGNOSIS — R06.02 SHORTNESS OF BREATH: Primary | ICD-10-CM

## 2023-04-19 DIAGNOSIS — M25.511 PAIN OF BOTH SHOULDER JOINTS: ICD-10-CM

## 2023-04-19 DIAGNOSIS — M25.561 ACUTE PAIN OF BOTH KNEES: ICD-10-CM

## 2023-04-19 DIAGNOSIS — H93.13 TINNITUS OF BOTH EARS: ICD-10-CM

## 2023-04-19 DIAGNOSIS — M25.562 ACUTE PAIN OF BOTH KNEES: ICD-10-CM

## 2023-04-19 DIAGNOSIS — M25.512 PAIN OF BOTH SHOULDER JOINTS: ICD-10-CM

## 2023-04-19 DIAGNOSIS — I51.89 DIASTOLIC DYSFUNCTION: ICD-10-CM

## 2023-04-19 DIAGNOSIS — I70.0 ATHEROSCLEROSIS OF AORTA: ICD-10-CM

## 2023-04-19 PROCEDURE — 99999 PR PBB SHADOW E&M-EST. PATIENT-LVL IV: CPT | Mod: PBBFAC,,,

## 2023-04-19 PROCEDURE — 71046 X-RAY EXAM CHEST 2 VIEWS: CPT | Mod: 26,,, | Performed by: RADIOLOGY

## 2023-04-19 PROCEDURE — 3288F FALL RISK ASSESSMENT DOCD: CPT | Mod: CPTII,S$GLB,,

## 2023-04-19 PROCEDURE — 1125F AMNT PAIN NOTED PAIN PRSNT: CPT | Mod: CPTII,S$GLB,,

## 2023-04-19 PROCEDURE — 99215 PR OFFICE/OUTPT VISIT, EST, LEVL V, 40-54 MIN: ICD-10-PCS | Mod: S$GLB,,,

## 2023-04-19 PROCEDURE — 1125F PR PAIN SEVERITY QUANTIFIED, PAIN PRESENT: ICD-10-PCS | Mod: CPTII,S$GLB,,

## 2023-04-19 PROCEDURE — 99215 OFFICE O/P EST HI 40 MIN: CPT | Mod: S$GLB,,,

## 2023-04-19 PROCEDURE — 1101F PR PT FALLS ASSESS DOC 0-1 FALLS W/OUT INJ PAST YR: ICD-10-PCS | Mod: CPTII,S$GLB,,

## 2023-04-19 PROCEDURE — 1160F PR REVIEW ALL MEDS BY PRESCRIBER/CLIN PHARMACIST DOCUMENTED: ICD-10-PCS | Mod: CPTII,S$GLB,,

## 2023-04-19 PROCEDURE — 1101F PT FALLS ASSESS-DOCD LE1/YR: CPT | Mod: CPTII,S$GLB,,

## 2023-04-19 PROCEDURE — 99999 PR PBB SHADOW E&M-EST. PATIENT-LVL IV: ICD-10-PCS | Mod: PBBFAC,,,

## 2023-04-19 PROCEDURE — 71046 X-RAY EXAM CHEST 2 VIEWS: CPT | Mod: TC

## 2023-04-19 PROCEDURE — 1159F MED LIST DOCD IN RCRD: CPT | Mod: CPTII,S$GLB,,

## 2023-04-19 PROCEDURE — 1159F PR MEDICATION LIST DOCUMENTED IN MEDICAL RECORD: ICD-10-PCS | Mod: CPTII,S$GLB,,

## 2023-04-19 PROCEDURE — 1160F RVW MEDS BY RX/DR IN RCRD: CPT | Mod: CPTII,S$GLB,,

## 2023-04-19 PROCEDURE — 3288F PR FALLS RISK ASSESSMENT DOCUMENTED: ICD-10-PCS | Mod: CPTII,S$GLB,,

## 2023-04-19 PROCEDURE — 71046 XR CHEST PA AND LATERAL: ICD-10-PCS | Mod: 26,,, | Performed by: RADIOLOGY

## 2023-04-19 RX ORDER — FUROSEMIDE 20 MG/1
20 TABLET ORAL DAILY
Qty: 30 TABLET | Refills: 0 | Status: SHIPPED | OUTPATIENT
Start: 2023-04-19 | End: 2023-05-05 | Stop reason: SDUPTHER

## 2023-04-19 RX ORDER — PREDNISONE 10 MG/1
10 TABLET ORAL 2 TIMES DAILY
Qty: 10 TABLET | Refills: 0 | Status: SHIPPED | OUTPATIENT
Start: 2023-04-19 | End: 2023-05-05 | Stop reason: SDUPTHER

## 2023-04-19 RX ORDER — IPRATROPIUM BROMIDE AND ALBUTEROL SULFATE 2.5; .5 MG/3ML; MG/3ML
3 SOLUTION RESPIRATORY (INHALATION) EVERY 6 HOURS PRN
Qty: 180 ML | Refills: 0 | Status: SHIPPED | OUTPATIENT
Start: 2023-04-19 | End: 2023-05-05 | Stop reason: SDUPTHER

## 2023-04-19 RX ORDER — MELOXICAM 15 MG/1
15 TABLET ORAL DAILY PRN
Qty: 30 TABLET | Refills: 0 | Status: SHIPPED | OUTPATIENT
Start: 2023-04-19 | End: 2023-05-05 | Stop reason: SDUPTHER

## 2023-04-19 NOTE — PROGRESS NOTES
Long Albarran  04/19/2023  [id number]    Penny Watson DO  Patient Care Team:  Penny Watson DO as PCP - General (Internal Medicine)  Candida Lawrence LPN as Care Coordinator (Internal Medicine)          Visit Type:an urgent visit for an existing problem     Chief Complaint:  Chief Complaint   Patient presents with    Shortness of Breath     They would like to get a new nebulizer machine ordered and needs refill on solution    Hearing Problem     Hearing noises    Dizziness        History of Present Illness:    89 year old male presents today with his daughter  Using daughter as a     SOB for the last few days  He has a Neb Machine  Daughter would like a new machine/mask  Need refill on Nebs  He is not coughing. No congestion symptoms   Legs are not swelling  Able to lay flat    Shoulder and knee pain for last few days  Tried tylenol without relief   No falls     A few days ago started having ringing in ears      History:  Past Medical History:   Diagnosis Date    Diastolic dysfunction 12/2017    Hypertriglyceridemia      Past Surgical History:   Procedure Laterality Date    ABDOMINAL SURGERY      appendix removal in [place] 3 years ago    CATARACT EXTRACTION, BILATERAL       Family History   Problem Relation Age of Onset    Cancer Neg Hx     Diabetes Neg Hx     Heart disease Neg Hx     Hypertension Neg Hx     Kidney disease Neg Hx     Stroke Neg Hx      Social History     Socioeconomic History    Marital status: Single   Tobacco Use    Smoking status: Former    Smokeless tobacco: Never   Substance and Sexual Activity    Alcohol use: No     Alcohol/week: 0.0 standard drinks    Drug use: No     Patient Active Problem List   Diagnosis    Diastolic dysfunction    Shortness of breath    Atrial fibrillation    Bradycardia    Lipids abnormal    Pulmonary heart disease    Atherosclerosis of aorta     Review of patient's allergies indicates:  No Known Allergies    The following were reviewed at this visit:  active problem list, medication list, allergies, family history, social history, and health maintenance.    Medications:  Current Outpatient Medications on File Prior to Visit   Medication Sig Dispense Refill    albuterol (ACCUNEB) 0.63 mg/3 mL Nebu Take 3 mLs (0.63 mg total) by nebulization every 6 (six) hours as needed (wheezing or shortness of breath). 75 mL 0    albuterol-ipratropium (DUO-NEB) 2.5 mg-0.5 mg/3 mL nebulizer solution Take 3 mLs by nebulization every 6 (six) hours as needed for Wheezing. 180 mL 0    nebulizer and compressor Alisa use as directed 1 each 0     Current Facility-Administered Medications on File Prior to Visit   Medication Dose Route Frequency Provider Last Rate Last Admin    aspirin chewable tablet 81 mg  81 mg Oral Daily Herbert Guzman MD           Medications have been reviewed and reconciled with patient at this visit.  Barriers to medications reviewed with patient.    Adverse reactions to current medications reviewed with patient..    Over the counter medications reviewed and reconciled with patient.    Exam:  Wt Readings from Last 3 Encounters:   05/31/22 61.1 kg (134 lb 11.2 oz)   03/16/22 63.6 kg (140 lb 3.4 oz)   02/17/22 63 kg (139 lb)     Temp Readings from Last 3 Encounters:   05/31/22 98.2 °F (36.8 °C) (Oral)   01/21/22 97.6 °F (36.4 °C) (Tympanic)   01/06/22 98.1 °F (36.7 °C) (Oral)     BP Readings from Last 3 Encounters:   05/31/22 134/62   03/16/22 122/76   02/17/22 (!) 140/60     Pulse Readings from Last 3 Encounters:   05/31/22 65   03/16/22 60   01/26/22 (!) 54     There is no height or weight on file to calculate BMI.      Review of Systems   HENT:  Positive for hearing loss and tinnitus. Negative for congestion and sinus pain.    Respiratory:  Positive for shortness of breath.    Cardiovascular:  Negative for chest pain.   Musculoskeletal:  Positive for joint pain.   Physical Exam  Vitals and nursing note reviewed.   Constitutional:       General: He is not in  acute distress.     Appearance: He is well-developed. He is not diaphoretic.   HENT:      Head: Normocephalic and atraumatic.      Right Ear: Tympanic membrane and external ear normal.      Left Ear: Tympanic membrane and external ear normal.   Eyes:      General:         Right eye: No discharge.         Left eye: No discharge.      Conjunctiva/sclera: Conjunctivae normal.      Pupils: Pupils are equal, round, and reactive to light.   Neck:      Thyroid: No thyromegaly.   Cardiovascular:      Rate and Rhythm: Normal rate and regular rhythm.      Heart sounds: Normal heart sounds. No murmur heard.  Pulmonary:      Effort: Pulmonary effort is normal. No respiratory distress.      Breath sounds: Examination of the right-upper field reveals decreased breath sounds. Examination of the left-upper field reveals decreased breath sounds. Examination of the right-lower field reveals decreased breath sounds. Examination of the left-lower field reveals decreased breath sounds. Decreased breath sounds present. No wheezing.   Musculoskeletal:         General: Tenderness present.      Right shoulder: Tenderness present.      Left shoulder: Tenderness present.      Right knee: Tenderness present.      Left knee: Tenderness present.   Neurological:      Mental Status: He is alert.      Motor: Weakness present.   Psychiatric:         Judgment: Judgment normal.       Laboratory Reviewed ({Yes)  Lab Results   Component Value Date    WBC 4.22 01/07/2022    HGB 14.1 01/07/2022    HCT 40.3 01/07/2022     (L) 01/07/2022    CHOL 191 07/19/2017    TRIG 234 (H) 07/19/2017    HDL 36 (L) 07/19/2017    ALT 24 01/07/2022    AST 37 01/07/2022     01/07/2022    K 3.5 01/07/2022     01/07/2022    CREATININE 0.8 01/07/2022    BUN 16 01/07/2022    CO2 25 01/07/2022       Long was seen today for shortness of breath, hearing problem and dizziness.    Diagnoses and all orders for this visit:    Shortness of breath  -      albuterol-ipratropium (DUO-NEB) 2.5 mg-0.5 mg/3 mL nebulizer solution; Take 3 mLs by nebulization every 6 (six) hours as needed for Wheezing.  -     X-Ray Chest PA And Lateral; Future  -     B-TYPE NATRIURETIC PEPTIDE; Future  -     COMPREHENSIVE METABOLIC PANEL; Future  -     CBC Auto Differential; Future  -     predniSONE (DELTASONE) 10 MG tablet; Take 1 tablet (10 mg total) by mouth 2 (two) times daily.  -     NEBULIZER FOR HOME USE  -     NEBULIZER KIT (SUPPLIES) FOR HOME USE    Diastolic dysfunction  -     Lipid Panel; Future    Pulmonary heart disease  -     albuterol-ipratropium (DUO-NEB) 2.5 mg-0.5 mg/3 mL nebulizer solution; Take 3 mLs by nebulization every 6 (six) hours as needed for Wheezing.  -     Lipid Panel; Future  -     NEBULIZER FOR HOME USE  -     NEBULIZER KIT (SUPPLIES) FOR HOME USE    Paroxysmal atrial fibrillation  -     COMPREHENSIVE METABOLIC PANEL; Future  -     CBC Auto Differential; Future  -     Lipid Panel; Future    Atherosclerosis of aorta  -     Lipid Panel; Future    Tinnitus of both ears  -     Ambulatory referral/consult to ENT; Future    Pain of both shoulder joints  -     meloxicam (MOBIC) 15 MG tablet; Take 1 tablet (15 mg total) by mouth daily as needed for Pain.    Acute pain of both knees  -     meloxicam (MOBIC) 15 MG tablet; Take 1 tablet (15 mg total) by mouth daily as needed for Pain.      Will order chest x-ray and labs today to look for any abnormalities.  Will give a short course of steroids to see if it helps with some of the joint pain shortness of breath.  Encouraged to continue using home nebulizer.  We will order a new nebulizer with a mask    Schedule an appointment with the ENT and the patient needs a follow-up appointment with Cardiology.      Care Plan/Goals: Reviewed    Goals    None         Follow up: No follow-ups on file.    After visit summary was printed and given to patient upon discharge today.  Patient goals and care plan are included in After Visit  Summary.

## 2023-04-19 NOTE — TELEPHONE ENCOUNTER
"Orders for Home use Nebulizer and supplies sent to [facility] at [phone].    Outcome:     Your fax has been successfully sent to [phone] at [phone].    4/19/2023 11:01:44 AM Transmission Record   Sent to [phone] [facility] remote [facility] "   Result: (0/339;0/0) Success   Page record: 1 - 5   Elapsed time: 01:40 on channel 8    "

## 2023-04-20 ENCOUNTER — TELEPHONE (OUTPATIENT)
Dept: OTOLARYNGOLOGY | Facility: CLINIC | Age: 88
End: 2023-04-20
Payer: MEDICARE

## 2023-04-25 ENCOUNTER — TELEPHONE (OUTPATIENT)
Dept: INTERNAL MEDICINE | Facility: CLINIC | Age: 88
End: 2023-04-25
Payer: MEDICARE

## 2023-04-27 ENCOUNTER — CLINICAL SUPPORT (OUTPATIENT)
Dept: AUDIOLOGY | Facility: CLINIC | Age: 88
End: 2023-04-27
Payer: MEDICARE

## 2023-04-27 ENCOUNTER — OFFICE VISIT (OUTPATIENT)
Dept: OTOLARYNGOLOGY | Facility: CLINIC | Age: 88
End: 2023-04-27
Payer: MEDICARE

## 2023-04-27 VITALS — BODY MASS INDEX: 24.3 KG/M2 | WEIGHT: 137.13 LBS | HEIGHT: 63 IN | TEMPERATURE: 99 F

## 2023-04-27 DIAGNOSIS — H93.13 TINNITUS OF BOTH EARS: ICD-10-CM

## 2023-04-27 DIAGNOSIS — H93.13 TINNITUS AURIUM, BILATERAL: ICD-10-CM

## 2023-04-27 DIAGNOSIS — H90.3 SENSORINEURAL HEARING LOSS (SNHL) OF BOTH EARS: Primary | ICD-10-CM

## 2023-04-27 DIAGNOSIS — H90.3 SENSORINEURAL HEARING LOSS, BILATERAL: Primary | ICD-10-CM

## 2023-04-27 PROCEDURE — 99204 OFFICE O/P NEW MOD 45 MIN: CPT | Mod: S$GLB,,, | Performed by: OTOLARYNGOLOGY

## 2023-04-27 PROCEDURE — 3288F FALL RISK ASSESSMENT DOCD: CPT | Mod: CPTII,S$GLB,, | Performed by: OTOLARYNGOLOGY

## 2023-04-27 PROCEDURE — 92567 PR TYMPA2METRY: ICD-10-PCS | Mod: S$GLB,,, | Performed by: AUDIOLOGIST-HEARING AID FITTER

## 2023-04-27 PROCEDURE — 99204 PR OFFICE/OUTPT VISIT, NEW, LEVL IV, 45-59 MIN: ICD-10-PCS | Mod: S$GLB,,, | Performed by: OTOLARYNGOLOGY

## 2023-04-27 PROCEDURE — 1126F PR PAIN SEVERITY QUANTIFIED, NO PAIN PRESENT: ICD-10-PCS | Mod: CPTII,S$GLB,, | Performed by: OTOLARYNGOLOGY

## 2023-04-27 PROCEDURE — 99999 PR PBB SHADOW E&M-EST. PATIENT-LVL I: CPT | Mod: PBBFAC,,, | Performed by: AUDIOLOGIST-HEARING AID FITTER

## 2023-04-27 PROCEDURE — 3288F PR FALLS RISK ASSESSMENT DOCUMENTED: ICD-10-PCS | Mod: CPTII,S$GLB,, | Performed by: OTOLARYNGOLOGY

## 2023-04-27 PROCEDURE — 99999 PR PBB SHADOW E&M-EST. PATIENT-LVL I: ICD-10-PCS | Mod: PBBFAC,,, | Performed by: AUDIOLOGIST-HEARING AID FITTER

## 2023-04-27 PROCEDURE — 99999 PR PBB SHADOW E&M-EST. PATIENT-LVL III: ICD-10-PCS | Mod: PBBFAC,,, | Performed by: OTOLARYNGOLOGY

## 2023-04-27 PROCEDURE — 92557 COMPREHENSIVE HEARING TEST: CPT | Mod: S$GLB,,, | Performed by: AUDIOLOGIST-HEARING AID FITTER

## 2023-04-27 PROCEDURE — 92557 PR COMPREHENSIVE HEARING TEST: ICD-10-PCS | Mod: S$GLB,,, | Performed by: AUDIOLOGIST-HEARING AID FITTER

## 2023-04-27 PROCEDURE — 1126F AMNT PAIN NOTED NONE PRSNT: CPT | Mod: CPTII,S$GLB,, | Performed by: OTOLARYNGOLOGY

## 2023-04-27 PROCEDURE — 1159F MED LIST DOCD IN RCRD: CPT | Mod: CPTII,S$GLB,, | Performed by: OTOLARYNGOLOGY

## 2023-04-27 PROCEDURE — 99999 PR PBB SHADOW E&M-EST. PATIENT-LVL III: CPT | Mod: PBBFAC,,, | Performed by: OTOLARYNGOLOGY

## 2023-04-27 PROCEDURE — 1101F PT FALLS ASSESS-DOCD LE1/YR: CPT | Mod: CPTII,S$GLB,, | Performed by: OTOLARYNGOLOGY

## 2023-04-27 PROCEDURE — 1101F PR PT FALLS ASSESS DOC 0-1 FALLS W/OUT INJ PAST YR: ICD-10-PCS | Mod: CPTII,S$GLB,, | Performed by: OTOLARYNGOLOGY

## 2023-04-27 PROCEDURE — 92567 TYMPANOMETRY: CPT | Mod: S$GLB,,, | Performed by: AUDIOLOGIST-HEARING AID FITTER

## 2023-04-27 PROCEDURE — 1159F PR MEDICATION LIST DOCUMENTED IN MEDICAL RECORD: ICD-10-PCS | Mod: CPTII,S$GLB,, | Performed by: OTOLARYNGOLOGY

## 2023-04-27 NOTE — PROGRESS NOTES
"Referring Provider:    Jenelle Watson Np  [id number] [place] [address]  Subjective:   Patient: Long Albarran [id number], :1929   Visit date:2023 8:43 AM    Chief Complaint:  Tinnitus    HPI:    Prior notes reviewed by myself.  Clinical documentation obtained by nursing staff reviewed.     Gentleman presents for evaluation of hearing loss.  His family has noticed increased difficulty in him understanding them.  He also reports bilateral nonpulsatile tinnitus.  This has been a progressive issue over many years.  They deny any recent otologic history, trauma, loud noise exposure, otorrhea, infections.  He does not currently wear hearing aids.      Objective:     Physical Exam:  Vitals:  Temp 98.7 °F (37.1 °C)   Ht 5' 3" (1.6 m)   Wt 62.2 kg (137 lb 2 oz)   BMI 24.29 kg/m²   General appearance:  Well developed, well nourished    Ears:  Otoscopy of external auditory canals and tympanic membranes was normal, clinical speech reception thresholds grossly intact, no mass/lesion of auricle.    Nose:  No masses/lesions of external nose, nasal mucosa, septum, and turbinates were within normal limits.    Mouth:  No mass/lesion of lips, teeth, gums, hard/soft palate, tongue, tonsils, or oropharynx.    Neck & Lymphatics:  No cervical lymphadenopathy, no neck mass/crepitus/ asymmetry, trachea is midline, no thyroid enlargement/tenderness/mass.        [x]  Data Reviewed:    Lab Results   Component Value Date    WBC 4.96 2023    HGB 15.1 2023    HCT 44.7 2023    MCV 90 2023    EOSINOPHIL 5.2 2023         [x]  Independent interpretation of test: mild downsloping to severe SNHL     Media Information    File Link    Annotation on 2023  8:37 AM by Azael Steward, REINALDO-A: AUDIOGRAM        Key Information    Document ID File Type Document Type Description   [id number] Annotation Annotation AUDIOGRAM     Import Information    Attached At Date Time User " Dept   Encounter Level 4/27/2023  8:37 AM Azael Steward, REINALDO-A On Audiology     Encounter    Clinical Support on 4/27/23 with Azael Steward, REINALDO-RALPH             Assessment & Plan:   Sensorineural hearing loss (SNHL) of both ears    Tinnitus aurium, bilateral        We reviewed the patient's recent audiogram and hearing loss in detail.  We also discussed that he is a good candidate for hearing aids, if and when he the patient is motivated.  He was given handouts with information and pricing of hearing aids, and will contact audiology when ready to proceed.  We also discussed the use hearing protection when exposed to loud noise, including lawn equipment.

## 2023-04-27 NOTE — PROGRESS NOTES
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  Patient complains of hearing loss.  His family has noticed increased difficulty in him understanding them.  He also reports bilateral nonpulsatile tinnitus.  This has been a progressive issue over many years.  They deny any recent otologic history, trauma, loud noise exposure, otorrhea, infections. His last audiogram was in 2017.  He does not currently wear hearing aids.    Results reveal a mild-to-profound sensorineural hearing loss 250-8000 Hz for the right ear, and a mild-to-profound sensorineural hearing loss 250-8000 Hz for the left ear.   Speech could not be tested due to non-English speaking.   Tympanograms were Type A for the right ear and Type A for the left ear.    No change in hearing since his previous audiogram from 2017.   Patient was counseled on the above findings.    Recommendations:  ENT at completion of this visit  Hearing aids, binaural. Patient and his daughter are provided a copy of his audiogram and is notified about [employer].

## 2023-05-05 DIAGNOSIS — R00.1 BRADYCARDIA: ICD-10-CM

## 2023-05-05 DIAGNOSIS — J90 PLEURAL EFFUSION: ICD-10-CM

## 2023-05-05 DIAGNOSIS — M25.512 PAIN OF BOTH SHOULDER JOINTS: ICD-10-CM

## 2023-05-05 DIAGNOSIS — M25.562 ACUTE PAIN OF BOTH KNEES: ICD-10-CM

## 2023-05-05 DIAGNOSIS — R06.02 SHORTNESS OF BREATH: ICD-10-CM

## 2023-05-05 DIAGNOSIS — M25.561 ACUTE PAIN OF BOTH KNEES: ICD-10-CM

## 2023-05-05 DIAGNOSIS — I48.0 PAROXYSMAL ATRIAL FIBRILLATION: Primary | ICD-10-CM

## 2023-05-05 DIAGNOSIS — I27.9 PULMONARY HEART DISEASE: ICD-10-CM

## 2023-05-05 DIAGNOSIS — M25.511 PAIN OF BOTH SHOULDER JOINTS: ICD-10-CM

## 2023-05-05 RX ORDER — PREDNISONE 10 MG/1
10 TABLET ORAL 2 TIMES DAILY
Qty: 10 TABLET | Refills: 0 | Status: SHIPPED | OUTPATIENT
Start: 2023-05-05 | End: 2023-12-06

## 2023-05-05 RX ORDER — FUROSEMIDE 20 MG/1
20 TABLET ORAL DAILY
Qty: 30 TABLET | Refills: 0 | Status: SHIPPED | OUTPATIENT
Start: 2023-05-05 | End: 2023-05-08 | Stop reason: SDUPTHER

## 2023-05-05 RX ORDER — IPRATROPIUM BROMIDE AND ALBUTEROL SULFATE 2.5; .5 MG/3ML; MG/3ML
3 SOLUTION RESPIRATORY (INHALATION) EVERY 6 HOURS PRN
Qty: 180 ML | Refills: 0 | Status: SHIPPED | OUTPATIENT
Start: 2023-05-05 | End: 2023-12-06

## 2023-05-05 RX ORDER — MELOXICAM 15 MG/1
15 TABLET ORAL DAILY PRN
Qty: 30 TABLET | Refills: 0 | Status: SHIPPED | OUTPATIENT
Start: 2023-05-05 | End: 2023-11-29

## 2023-05-05 NOTE — TELEPHONE ENCOUNTER
No care due was identified.  [facility] Embedded Care Due Messages. Reference number: [id number].   5/05/2023 3:48:02 PM CDT

## 2023-05-05 NOTE — TELEPHONE ENCOUNTER
----- Message from Jose Luis Miller sent at 5/5/2023 12:51 PM CDT -----  Contact: Regine/Son  Patients son is calling to speak with the nurse in regards to medication. Reports medication was to be sent to pharmacy for flu. Please give Regine a callback at [phone]

## 2023-05-05 NOTE — TELEPHONE ENCOUNTER
Patient's meds sent on 4/19 never made it to the [facility]. Can we resend the pend meds.

## 2023-05-08 ENCOUNTER — HOSPITAL ENCOUNTER (OUTPATIENT)
Dept: CARDIOLOGY | Facility: HOSPITAL | Age: 88
Discharge: HOME | End: 2023-05-08
Attending: INTERNAL MEDICINE
Payer: MEDICARE

## 2023-05-08 ENCOUNTER — OFFICE VISIT (OUTPATIENT)
Dept: CARDIOLOGY | Facility: CLINIC | Age: 88
End: 2023-05-08
Payer: MEDICARE

## 2023-05-08 VITALS
HEIGHT: 63 IN | SYSTOLIC BLOOD PRESSURE: 118 MMHG | WEIGHT: 138.44 LBS | DIASTOLIC BLOOD PRESSURE: 62 MMHG | BODY MASS INDEX: 24.53 KG/M2

## 2023-05-08 DIAGNOSIS — R06.02 SHORTNESS OF BREATH: ICD-10-CM

## 2023-05-08 DIAGNOSIS — I48.0 PAROXYSMAL ATRIAL FIBRILLATION: ICD-10-CM

## 2023-05-08 DIAGNOSIS — I27.9 PULMONARY HEART DISEASE: ICD-10-CM

## 2023-05-08 DIAGNOSIS — I51.89 DIASTOLIC DYSFUNCTION: ICD-10-CM

## 2023-05-08 DIAGNOSIS — J90 PLEURAL EFFUSION: ICD-10-CM

## 2023-05-08 DIAGNOSIS — I70.0 ATHEROSCLEROSIS OF AORTA: ICD-10-CM

## 2023-05-08 DIAGNOSIS — I50.32 CHRONIC DIASTOLIC CONGESTIVE HEART FAILURE: Primary | ICD-10-CM

## 2023-05-08 DIAGNOSIS — R00.1 BRADYCARDIA: ICD-10-CM

## 2023-05-08 PROCEDURE — 93010 ELECTROCARDIOGRAM REPORT: CPT | Mod: ,,, | Performed by: INTERNAL MEDICINE

## 2023-05-08 PROCEDURE — 1126F AMNT PAIN NOTED NONE PRSNT: CPT | Mod: CPTII,S$GLB,, | Performed by: INTERNAL MEDICINE

## 2023-05-08 PROCEDURE — 1101F PT FALLS ASSESS-DOCD LE1/YR: CPT | Mod: CPTII,S$GLB,, | Performed by: INTERNAL MEDICINE

## 2023-05-08 PROCEDURE — 1101F PR PT FALLS ASSESS DOC 0-1 FALLS W/OUT INJ PAST YR: ICD-10-PCS | Mod: CPTII,S$GLB,, | Performed by: INTERNAL MEDICINE

## 2023-05-08 PROCEDURE — 99999 PR PBB SHADOW E&M-EST. PATIENT-LVL III: CPT | Mod: PBBFAC,,, | Performed by: INTERNAL MEDICINE

## 2023-05-08 PROCEDURE — 93010 EKG 12-LEAD: ICD-10-PCS | Mod: ,,, | Performed by: INTERNAL MEDICINE

## 2023-05-08 PROCEDURE — 1160F RVW MEDS BY RX/DR IN RCRD: CPT | Mod: CPTII,S$GLB,, | Performed by: INTERNAL MEDICINE

## 2023-05-08 PROCEDURE — 1126F PR PAIN SEVERITY QUANTIFIED, NO PAIN PRESENT: ICD-10-PCS | Mod: CPTII,S$GLB,, | Performed by: INTERNAL MEDICINE

## 2023-05-08 PROCEDURE — 99214 PR OFFICE/OUTPT VISIT, EST, LEVL IV, 30-39 MIN: ICD-10-PCS | Mod: S$GLB,,, | Performed by: INTERNAL MEDICINE

## 2023-05-08 PROCEDURE — 99999 PR PBB SHADOW E&M-EST. PATIENT-LVL III: ICD-10-PCS | Mod: PBBFAC,,, | Performed by: INTERNAL MEDICINE

## 2023-05-08 PROCEDURE — 3288F PR FALLS RISK ASSESSMENT DOCUMENTED: ICD-10-PCS | Mod: CPTII,S$GLB,, | Performed by: INTERNAL MEDICINE

## 2023-05-08 PROCEDURE — 1159F PR MEDICATION LIST DOCUMENTED IN MEDICAL RECORD: ICD-10-PCS | Mod: CPTII,S$GLB,, | Performed by: INTERNAL MEDICINE

## 2023-05-08 PROCEDURE — 93005 ELECTROCARDIOGRAM TRACING: CPT

## 2023-05-08 PROCEDURE — 3288F FALL RISK ASSESSMENT DOCD: CPT | Mod: CPTII,S$GLB,, | Performed by: INTERNAL MEDICINE

## 2023-05-08 PROCEDURE — 1160F PR REVIEW ALL MEDS BY PRESCRIBER/CLIN PHARMACIST DOCUMENTED: ICD-10-PCS | Mod: CPTII,S$GLB,, | Performed by: INTERNAL MEDICINE

## 2023-05-08 PROCEDURE — 1159F MED LIST DOCD IN RCRD: CPT | Mod: CPTII,S$GLB,, | Performed by: INTERNAL MEDICINE

## 2023-05-08 PROCEDURE — 99214 OFFICE O/P EST MOD 30 MIN: CPT | Mod: S$GLB,,, | Performed by: INTERNAL MEDICINE

## 2023-05-08 RX ORDER — FUROSEMIDE 20 MG/1
20 TABLET ORAL DAILY
Qty: 30 TABLET | Refills: 5 | Status: SHIPPED | OUTPATIENT
Start: 2023-05-08 | End: 2023-05-26 | Stop reason: SDUPTHER

## 2023-05-08 NOTE — PROGRESS NOTES
Subjective:   Patient ID:  Long Albarran is a 89 y.o. male who presents for follow up of Dizziness, Shortness of Breath, and Chest Pain      89 yo male, came in for SOB  PMG CHFpEF PAF, HTN DJD,    edho EF nl severe LAE,pulm HTN PAP 42 mmHG  Ekg NSR 1st AVB, LVH   's  chronic   Lasix added and SOB improved        Past Medical History:   Diagnosis Date    Diastolic dysfunction 12/2017    Hypertriglyceridemia        Past Surgical History:   Procedure Laterality Date    ABDOMINAL SURGERY      appendix removal in [place] 3 years ago    CATARACT EXTRACTION, BILATERAL         Social History     Tobacco Use    Smoking status: Former    Smokeless tobacco: Never   Substance Use Topics    Alcohol use: No     Alcohol/week: 0.0 standard drinks    Drug use: No       Family History   Problem Relation Age of Onset    Cancer Neg Hx     Diabetes Neg Hx     Heart disease Neg Hx     Hypertension Neg Hx     Kidney disease Neg Hx     Stroke Neg Hx          Review of Systems   Constitutional: Negative for decreased appetite, diaphoresis, fever, malaise/fatigue and night sweats.   HENT:  Negative for nosebleeds.    Eyes:  Negative for blurred vision and double vision.   Cardiovascular:  Positive for dyspnea on exertion. Negative for chest pain, claudication, irregular heartbeat, leg swelling, near-syncope, orthopnea, palpitations, paroxysmal nocturnal dyspnea and syncope.   Respiratory:  Negative for cough, shortness of breath, sleep disturbances due to breathing, snoring, sputum production and wheezing.    Endocrine: Negative for cold intolerance and polyuria.   Hematologic/Lymphatic: Does not bruise/bleed easily.   Skin:  Negative for rash.   Musculoskeletal:  Negative for back pain, falls, joint pain, joint swelling and neck pain.   Gastrointestinal:  Negative for abdominal pain, heartburn, nausea and vomiting.   Genitourinary:  Negative for dysuria, frequency and hematuria.   Neurological:  Negative for  difficulty with concentration, dizziness, focal weakness, headaches, light-headedness, numbness, seizures and weakness.   Psychiatric/Behavioral:  Negative for depression, memory loss and substance abuse. The patient does not have insomnia.    Allergic/Immunologic: Negative for HIV exposure and hives.     Objective:   Physical Exam  HENT:      Head: Normocephalic.   Eyes:      Pupils: Pupils are equal, round, and reactive to light.   Neck:      Thyroid: No thyromegaly.      Vascular: Normal carotid pulses. No carotid bruit or JVD.   Cardiovascular:      Rate and Rhythm: Normal rate and regular rhythm. No extrasystoles are present.     Chest Wall: PMI is not displaced.      Pulses: Normal pulses.      Heart sounds: Normal heart sounds. No murmur heard.    No gallop. No S3 sounds.   Pulmonary:      Effort: No respiratory distress.      Breath sounds: Normal breath sounds. No stridor.   Abdominal:      General: Bowel sounds are normal.      Palpations: Abdomen is soft.      Tenderness: There is no abdominal tenderness. There is no rebound.   Skin:     Findings: No rash.   Neurological:      Mental Status: He is alert and oriented to person, place, and time.   Psychiatric:         Behavior: Behavior normal.       Lab Results   Component Value Date    CHOL 213 (H) 04/19/2023    CHOL 191 07/19/2017     Lab Results   Component Value Date    HDL 46 04/19/2023    HDL 36 (L) 07/19/2017     Lab Results   Component Value Date    LDLCALC 146.0 04/19/2023    LDLCALC 108.2 07/19/2017     Lab Results   Component Value Date    TRIG 105 04/19/2023    TRIG 234 (H) 07/19/2017     Lab Results   Component Value Date    CHOLHDL 21.6 04/19/2023    CHOLHDL 18.8 (L) 07/19/2017       Chemistry        Component Value Date/Time     04/19/2023 0853    K 4.8 04/19/2023 0853     04/19/2023 0853    CO2 25 04/19/2023 0853    BUN 17 04/19/2023 0853    CREATININE 0.8 04/19/2023 0853     04/19/2023 0853        Component Value  Date/Time    CALCIUM 9.2 04/19/2023 0853    ALKPHOS 84 04/19/2023 0853    AST 50 (H) 04/19/2023 0853    ALT 40 04/19/2023 0853    BILITOT 1.1 (H) 04/19/2023 0853    ESTGFRAFRICA >60 01/07/2022 0107    EGFRNONAA >60 01/07/2022 0107          No results found for: LABA1C, HGBA1C  No results found for: TSH  No results found for: INR, PROTIME  Lab Results   Component Value Date    WBC 4.96 04/19/2023    HGB 15.1 04/19/2023    HCT 44.7 04/19/2023    MCV 90 04/19/2023     04/19/2023     BMP  Sodium   Date Value Ref Range Status   04/19/2023 138 136 - 145 mmol/L Final     Potassium   Date Value Ref Range Status   04/19/2023 4.8 3.5 - 5.1 mmol/L Final     Chloride   Date Value Ref Range Status   04/19/2023 105 95 - 110 mmol/L Final     CO2   Date Value Ref Range Status   04/19/2023 25 23 - 29 mmol/L Final     BUN   Date Value Ref Range Status   04/19/2023 17 10 - 30 mg/dL Final     Creatinine   Date Value Ref Range Status   04/19/2023 0.8 0.5 - 1.4 mg/dL Final     Calcium   Date Value Ref Range Status   04/19/2023 9.2 8.7 - 10.5 mg/dL Final     Anion Gap   Date Value Ref Range Status   04/19/2023 8 8 - 16 mmol/L Final     eGFR if    Date Value Ref Range Status   01/07/2022 >60 >60 mL/min/1.73 m^2 Final     eGFR if non    Date Value Ref Range Status   01/07/2022 >60 >60 mL/min/1.73 m^2 Final     Comment:     Calculation used to obtain the estimated glomerular filtration  rate (eGFR) is the CKD-EPI equation.        BNP  @LABRCNTIP(BNP,BNPTRIAGEBLO)@  @LABRCNTIP(troponini)@  CrCl cannot be calculated (Patient's most recent lab result is older than the maximum 7 days allowed.).  No results found in the last 24 hours.  No results found in the last 24 hours.  No results found in the last 24 hours.    Assessment:      1. Diastolic dysfunction    2. Pulmonary heart disease    3. Atherosclerosis of aorta    4. Shortness of breath    5. Chronic diastolic congestive heart failure        Plan:    Continue Lasix 20 mg daily  Check BMP in 2 weeks  Daily weight. Please call the office if gain 3 pounds in 1 day or 5 pounds in 1 week.  Fluid restriction 1.5 liters a day  Na< 2 gm   \RTC in 2 months

## 2023-05-10 ENCOUNTER — OFFICE VISIT (OUTPATIENT)
Dept: INTERNAL MEDICINE | Facility: CLINIC | Age: 88
End: 2023-05-10
Payer: MEDICARE

## 2023-05-10 VITALS
SYSTOLIC BLOOD PRESSURE: 132 MMHG | BODY MASS INDEX: 24.68 KG/M2 | HEIGHT: 63 IN | HEART RATE: 84 BPM | TEMPERATURE: 98 F | WEIGHT: 139.31 LBS | DIASTOLIC BLOOD PRESSURE: 56 MMHG | RESPIRATION RATE: 20 BRPM | OXYGEN SATURATION: 98 %

## 2023-05-10 DIAGNOSIS — Z00.00 ANNUAL PHYSICAL EXAM: Primary | ICD-10-CM

## 2023-05-10 DIAGNOSIS — J90 PLEURAL EFFUSION, BILATERAL: ICD-10-CM

## 2023-05-10 DIAGNOSIS — E78.00 HYPERCHOLESTEROLEMIA: ICD-10-CM

## 2023-05-10 DIAGNOSIS — I50.32 CHRONIC DIASTOLIC CONGESTIVE HEART FAILURE: ICD-10-CM

## 2023-05-10 PROCEDURE — 1126F AMNT PAIN NOTED NONE PRSNT: CPT | Mod: CPTII,S$GLB,, | Performed by: INTERNAL MEDICINE

## 2023-05-10 PROCEDURE — 1101F PR PT FALLS ASSESS DOC 0-1 FALLS W/OUT INJ PAST YR: ICD-10-PCS | Mod: CPTII,S$GLB,, | Performed by: INTERNAL MEDICINE

## 2023-05-10 PROCEDURE — 1126F PR PAIN SEVERITY QUANTIFIED, NO PAIN PRESENT: ICD-10-PCS | Mod: CPTII,S$GLB,, | Performed by: INTERNAL MEDICINE

## 2023-05-10 PROCEDURE — 99999 PR PBB SHADOW E&M-EST. PATIENT-LVL IV: ICD-10-PCS | Mod: PBBFAC,,, | Performed by: INTERNAL MEDICINE

## 2023-05-10 PROCEDURE — 1159F MED LIST DOCD IN RCRD: CPT | Mod: CPTII,S$GLB,, | Performed by: INTERNAL MEDICINE

## 2023-05-10 PROCEDURE — 1160F PR REVIEW ALL MEDS BY PRESCRIBER/CLIN PHARMACIST DOCUMENTED: ICD-10-PCS | Mod: CPTII,S$GLB,, | Performed by: INTERNAL MEDICINE

## 2023-05-10 PROCEDURE — 99397 PR PREVENTIVE VISIT,EST,65 & OVER: ICD-10-PCS | Mod: S$GLB,,, | Performed by: INTERNAL MEDICINE

## 2023-05-10 PROCEDURE — 3288F FALL RISK ASSESSMENT DOCD: CPT | Mod: CPTII,S$GLB,, | Performed by: INTERNAL MEDICINE

## 2023-05-10 PROCEDURE — 3288F PR FALLS RISK ASSESSMENT DOCUMENTED: ICD-10-PCS | Mod: CPTII,S$GLB,, | Performed by: INTERNAL MEDICINE

## 2023-05-10 PROCEDURE — 99397 PER PM REEVAL EST PAT 65+ YR: CPT | Mod: S$GLB,,, | Performed by: INTERNAL MEDICINE

## 2023-05-10 PROCEDURE — 1159F PR MEDICATION LIST DOCUMENTED IN MEDICAL RECORD: ICD-10-PCS | Mod: CPTII,S$GLB,, | Performed by: INTERNAL MEDICINE

## 2023-05-10 PROCEDURE — 99999 PR PBB SHADOW E&M-EST. PATIENT-LVL IV: CPT | Mod: PBBFAC,,, | Performed by: INTERNAL MEDICINE

## 2023-05-10 PROCEDURE — 1101F PT FALLS ASSESS-DOCD LE1/YR: CPT | Mod: CPTII,S$GLB,, | Performed by: INTERNAL MEDICINE

## 2023-05-10 PROCEDURE — 1160F RVW MEDS BY RX/DR IN RCRD: CPT | Mod: CPTII,S$GLB,, | Performed by: INTERNAL MEDICINE

## 2023-05-10 NOTE — PROGRESS NOTES
Long Albarran  89 y.o.      Other male  [id number]    Chief Complaint:  Chief Complaint   Patient presents with    Annual Exam       History of Present Illness: The patient is a 89 year-old man with HFPEF and hypertriglyceridemia who presents with his daughter for his annual exam. Per patient's daughter, patient was experiencing significant weakness, dizziness, and shortness of breath last month. He has been taking furosemide, which has given him significant relief. Daughter also states that he saw Dr. Cross his cardiologist two days ago, and has a follow-up appointment scheduled in two weeks. Neither the patient nor daughter have any questions or concerns at today's visit.     Laboratory Reviewed: (Yes)  Lab Results   Component Value Date    WBC 4.96 04/19/2023    HGB 15.1 04/19/2023    HCT 44.7 04/19/2023     04/19/2023    CHOL 213 (H) 04/19/2023    TRIG 105 04/19/2023    HDL 46 04/19/2023    ALT 40 04/19/2023    AST 50 (H) 04/19/2023     04/19/2023    K 4.8 04/19/2023     04/19/2023    CREATININE 0.8 04/19/2023    BUN 17 04/19/2023    CO2 25 04/19/2023     Review of Systems   Constitutional:  Negative for chills and fever.   Respiratory:  Negative for cough.    Cardiovascular:  Negative for chest pain and palpitations.   Gastrointestinal:  Negative for nausea and vomiting.   Genitourinary:  Negative for dysuria.   Neurological:  Negative for dizziness.     The following were reviewed: Active problem list, medication list, allergies, family history, social history, and Health Maintenance.     History:  Past Medical History:   Diagnosis Date    Diastolic dysfunction 12/2017    Hypertriglyceridemia      Past Surgical History:   Procedure Laterality Date    ABDOMINAL SURGERY      appendix removal in [place] 3 years ago    CATARACT EXTRACTION, BILATERAL       Family History   Problem Relation Age of Onset    Cancer Neg Hx     Diabetes Neg Hx     Heart disease Neg Hx     Hypertension Neg Hx      Kidney disease Neg Hx     Stroke Neg Hx      Social History     Socioeconomic History    Marital status: Single   Tobacco Use    Smoking status: Former     Types: Cigarettes     Quit date:      Years since quittin.3    Smokeless tobacco: Never   Substance and Sexual Activity    Alcohol use: No     Alcohol/week: 0.0 standard drinks    Drug use: No     Patient Active Problem List   Diagnosis    Diastolic dysfunction    Shortness of breath    Atrial fibrillation    Bradycardia    Lipids abnormal    Pulmonary heart disease    Atherosclerosis of aorta    Chronic diastolic congestive heart failure     Review of patient's allergies indicates:  No Known Allergies    Health Maintenance  Health Maintenance Topics with due status: Not Due       Topic Last Completion Date    Influenza Vaccine 2022    Lipid Panel 2023     Health Maintenance Due   Topic Date Due    TETANUS VACCINE  Never done    Shingles Vaccine (1 of 2) Never done    COVID-19 Vaccine (4 - Booster for Pfizer series) 2022       Medications:  Current Outpatient Medications on File Prior to Visit   Medication Sig Dispense Refill    albuterol (ACCUNEB) 0.63 mg/3 mL Nebu Take 3 mLs (0.63 mg total) by nebulization every 6 (six) hours as needed (wheezing or shortness of breath). 75 mL 0    albuterol-ipratropium (DUO-NEB) 2.5 mg-0.5 mg/3 mL nebulizer solution Take 3 mLs by nebulization every 6 (six) hours as needed for Wheezing. 180 mL 0    furosemide (LASIX) 20 MG tablet Take 1 tablet (20 mg total) by mouth once daily. 30 tablet 5    meloxicam (MOBIC) 15 MG tablet Take 1 tablet (15 mg total) by mouth daily as needed for Pain. 30 tablet 0    nebulizer and compressor Alisa use as directed 1 each 0    predniSONE (DELTASONE) 10 MG tablet Take 1 tablet (10 mg total) by mouth 2 (two) times daily. 10 tablet 0     Current Facility-Administered Medications on File Prior to Visit   Medication Dose Route Frequency Provider Last Rate Last Admin    aspirin  chewable tablet 81 mg  81 mg Oral Daily Herbert Guzman MD           Medications have been reviewed and reconciled with patient at visit today.    Exam:  Vitals:    05/10/23 1336   BP: (!) 132/56   Pulse:    Resp:    Temp:      Weight: 63.2 kg (139 lb 5.3 oz)   Body mass index is 24.68 kg/m².      Physical Exam  Constitutional:       Appearance: Normal appearance.   HENT:      Head: Normocephalic and atraumatic.   Cardiovascular:      Rate and Rhythm: Normal rate and regular rhythm.      Pulses: Normal pulses.      Heart sounds: Normal heart sounds.   Pulmonary:      Effort: Pulmonary effort is normal.      Breath sounds: Normal breath sounds.   Musculoskeletal:         General: No swelling.      Right lower leg: No edema.      Left lower leg: No edema.   Skin:     General: Skin is warm and dry.   Neurological:      Mental Status: He is alert and oriented to person, place, and time.   Psychiatric:         Mood and Affect: Mood normal.         Behavior: Behavior normal.         Thought Content: Thought content normal.         Judgment: Judgment normal.       Assessment:  The primary encounter diagnosis was Annual physical exam. Diagnoses of Chronic diastolic congestive heart failure, Pleural effusion, bilateral, and Hypercholesterolemia were also pertinent to this visit.    Plan:  Annual physical exam  - No concerns by patient or daughter, patient to continue taking all medications as prescribed  - Counseled regarding age appropriate screenings and immunizations    Chronic diastolic congestive heart failure  - Patient has scheduled follow-up appointment with cardiology in 2 weeks, will f/u with Dr. Corss  - Follow cardiology recs    Pleural effusion, bilateral. Patient reports SOB improved  - Continue furosemide 20 mg     Hypercholesterolemia,  and cholesterol slightly elevated at 213 with most recent labs on 4/19/23.    Patient and daughter advised on importance of receiving tetanus, shingles and COVID  vaccines. Patient to be scheduled for future annual.     Wilmar Gan, MS4    I hereby acknowledge that I am relying upon documentation authored by a medical student working under my supervision and further I hereby attest that I have verified the student documentation or findings by personally performing the physical exam and medical decision making activities of the Evaluation and Management service to be billed.     Penny Watson D.O.

## 2023-05-24 ENCOUNTER — LAB VISIT (OUTPATIENT)
Dept: LAB | Facility: HOSPITAL | Age: 88
End: 2023-05-24
Attending: INTERNAL MEDICINE
Payer: MEDICARE

## 2023-05-24 DIAGNOSIS — I51.89 DIASTOLIC DYSFUNCTION: ICD-10-CM

## 2023-05-24 DIAGNOSIS — I50.32 CHRONIC DIASTOLIC CONGESTIVE HEART FAILURE: ICD-10-CM

## 2023-05-24 LAB
ANION GAP SERPL CALC-SCNC: 6 MMOL/L (ref 8–16)
BUN SERPL-MCNC: 17 MG/DL (ref 10–30)
CALCIUM SERPL-MCNC: 9.1 MG/DL (ref 8.7–10.5)
CHLORIDE SERPL-SCNC: 106 MMOL/L (ref 95–110)
CO2 SERPL-SCNC: 26 MMOL/L (ref 23–29)
CREAT SERPL-MCNC: 0.9 MG/DL (ref 0.5–1.4)
EST. GFR  (NO RACE VARIABLE): >60 ML/MIN/1.73 M^2
GLUCOSE SERPL-MCNC: 125 MG/DL (ref 70–110)
POTASSIUM SERPL-SCNC: 4.2 MMOL/L (ref 3.5–5.1)
SODIUM SERPL-SCNC: 138 MMOL/L (ref 136–145)

## 2023-05-24 PROCEDURE — 36415 COLL VENOUS BLD VENIPUNCTURE: CPT | Performed by: INTERNAL MEDICINE

## 2023-05-24 PROCEDURE — 80048 BASIC METABOLIC PNL TOTAL CA: CPT | Performed by: INTERNAL MEDICINE

## 2023-05-26 ENCOUNTER — TELEPHONE (OUTPATIENT)
Dept: CARDIOLOGY | Facility: CLINIC | Age: 88
End: 2023-05-26
Payer: MEDICARE

## 2023-05-26 DIAGNOSIS — J90 PLEURAL EFFUSION: ICD-10-CM

## 2023-05-26 NOTE — TELEPHONE ENCOUNTER
Spoke with pt in regards to lab results. Verbalized understanding information.             ----- Message from Bethel Cross MD sent at 5/25/2023  1:20 PM CDT -----  The lab stable  Continue current Rx  F/U as scheduled

## 2023-05-29 RX ORDER — FUROSEMIDE 20 MG/1
20 TABLET ORAL DAILY
Qty: 30 TABLET | Refills: 5 | Status: SHIPPED | OUTPATIENT
Start: 2023-05-29 | End: 2023-07-10 | Stop reason: SDUPTHER

## 2023-07-10 ENCOUNTER — OFFICE VISIT (OUTPATIENT)
Dept: CARDIOLOGY | Facility: CLINIC | Age: 88
End: 2023-07-10
Payer: MEDICARE

## 2023-07-10 VITALS
HEART RATE: 85 BPM | OXYGEN SATURATION: 96 % | DIASTOLIC BLOOD PRESSURE: 64 MMHG | SYSTOLIC BLOOD PRESSURE: 150 MMHG | WEIGHT: 136.69 LBS | HEIGHT: 63 IN | RESPIRATION RATE: 16 BRPM | BODY MASS INDEX: 24.22 KG/M2

## 2023-07-10 DIAGNOSIS — I50.32 CHRONIC DIASTOLIC CONGESTIVE HEART FAILURE: Primary | ICD-10-CM

## 2023-07-10 DIAGNOSIS — I70.0 ATHEROSCLEROSIS OF AORTA: ICD-10-CM

## 2023-07-10 DIAGNOSIS — I51.89 DIASTOLIC DYSFUNCTION: ICD-10-CM

## 2023-07-10 DIAGNOSIS — J90 PLEURAL EFFUSION: ICD-10-CM

## 2023-07-10 DIAGNOSIS — I48.0 PAROXYSMAL ATRIAL FIBRILLATION: ICD-10-CM

## 2023-07-10 PROCEDURE — 99999 PR PBB SHADOW E&M-EST. PATIENT-LVL III: ICD-10-PCS | Mod: PBBFAC,HCNC,, | Performed by: INTERNAL MEDICINE

## 2023-07-10 PROCEDURE — 99214 PR OFFICE/OUTPT VISIT, EST, LEVL IV, 30-39 MIN: ICD-10-PCS | Mod: HCNC,S$GLB,, | Performed by: INTERNAL MEDICINE

## 2023-07-10 PROCEDURE — 1160F RVW MEDS BY RX/DR IN RCRD: CPT | Mod: HCNC,CPTII,S$GLB, | Performed by: INTERNAL MEDICINE

## 2023-07-10 PROCEDURE — 1159F PR MEDICATION LIST DOCUMENTED IN MEDICAL RECORD: ICD-10-PCS | Mod: HCNC,CPTII,S$GLB, | Performed by: INTERNAL MEDICINE

## 2023-07-10 PROCEDURE — 1160F PR REVIEW ALL MEDS BY PRESCRIBER/CLIN PHARMACIST DOCUMENTED: ICD-10-PCS | Mod: HCNC,CPTII,S$GLB, | Performed by: INTERNAL MEDICINE

## 2023-07-10 PROCEDURE — 99214 OFFICE O/P EST MOD 30 MIN: CPT | Mod: HCNC,S$GLB,, | Performed by: INTERNAL MEDICINE

## 2023-07-10 PROCEDURE — 1159F MED LIST DOCD IN RCRD: CPT | Mod: HCNC,CPTII,S$GLB, | Performed by: INTERNAL MEDICINE

## 2023-07-10 PROCEDURE — 99999 PR PBB SHADOW E&M-EST. PATIENT-LVL III: CPT | Mod: PBBFAC,HCNC,, | Performed by: INTERNAL MEDICINE

## 2023-07-10 RX ORDER — LOSARTAN POTASSIUM 25 MG/1
25 TABLET ORAL DAILY
Qty: 90 TABLET | Refills: 3 | Status: SHIPPED | OUTPATIENT
Start: 2023-07-10 | End: 2024-01-11

## 2023-07-10 RX ORDER — FUROSEMIDE 20 MG/1
20 TABLET ORAL DAILY
Qty: 30 TABLET | Refills: 5 | Status: SHIPPED | OUTPATIENT
Start: 2023-07-10 | End: 2024-01-11

## 2023-07-10 NOTE — PROGRESS NOTES
Subjective:   Patient ID:  Long Albarran is a 89 y.o. male who presents for follow up of chronic diastolic heart failure      89 yo male, came in for routine f/u. Accompanied with his daughter  PMG CHFpEF PAF, HTN DJD,    edho EF nl severe LAE,pulm HTN PAP 42 mmHG  Ekg NSR 1st AVB, LVH   's  chronic   Lasix added and SOB improved    Interval history  Improved SOB. BMP wnl  No dizziness faint.      Past Medical History:   Diagnosis Date    Diastolic dysfunction 2017    Hypertriglyceridemia        Past Surgical History:   Procedure Laterality Date    ABDOMINAL SURGERY      appendix removal in [place] 3 years ago    CATARACT EXTRACTION, BILATERAL         Social History     Tobacco Use    Smoking status: Former     Types: Cigarettes     Quit date:      Years since quittin.5    Smokeless tobacco: Never   Substance Use Topics    Alcohol use: No     Alcohol/week: 0.0 standard drinks    Drug use: No       Family History   Problem Relation Age of Onset    Cancer Neg Hx     Diabetes Neg Hx     Heart disease Neg Hx     Hypertension Neg Hx     Kidney disease Neg Hx     Stroke Neg Hx          ROS    Objective:   Physical Exam  HENT:      Head: Normocephalic.   Eyes:      Pupils: Pupils are equal, round, and reactive to light.   Neck:      Thyroid: No thyromegaly.      Vascular: Normal carotid pulses. No carotid bruit or JVD.   Cardiovascular:      Rate and Rhythm: Normal rate and regular rhythm. No extrasystoles are present.     Chest Wall: PMI is not displaced.      Pulses: Normal pulses.      Heart sounds: Normal heart sounds. No murmur heard.    No gallop. No S3 sounds.   Pulmonary:      Effort: No respiratory distress.      Breath sounds: Normal breath sounds. No stridor.   Abdominal:      General: Bowel sounds are normal.      Palpations: Abdomen is soft.      Tenderness: There is no abdominal tenderness. There is no rebound.   Skin:     Findings: No rash.   Neurological:      Mental  Status: He is alert and oriented to person, place, and time.   Psychiatric:         Behavior: Behavior normal.       Lab Results   Component Value Date    CHOL 213 (H) 04/19/2023    CHOL 191 07/19/2017     Lab Results   Component Value Date    HDL 46 04/19/2023    HDL 36 (L) 07/19/2017     Lab Results   Component Value Date    LDLCALC 146.0 04/19/2023    LDLCALC 108.2 07/19/2017     Lab Results   Component Value Date    TRIG 105 04/19/2023    TRIG 234 (H) 07/19/2017     Lab Results   Component Value Date    CHOLHDL 21.6 04/19/2023    CHOLHDL 18.8 (L) 07/19/2017       Chemistry        Component Value Date/Time     05/24/2023 1007    K 4.2 05/24/2023 1007     05/24/2023 1007    CO2 26 05/24/2023 1007    BUN 17 05/24/2023 1007    CREATININE 0.9 05/24/2023 1007     (H) 05/24/2023 1007        Component Value Date/Time    CALCIUM 9.1 05/24/2023 1007    ALKPHOS 84 04/19/2023 0853    AST 50 (H) 04/19/2023 0853    ALT 40 04/19/2023 0853    BILITOT 1.1 (H) 04/19/2023 0853    ESTGFRAFRICA >60 01/07/2022 0107    EGFRNONAA >60 01/07/2022 0107          No results found for: LABA1C, HGBA1C  No results found for: TSH  No results found for: INR, PROTIME  Lab Results   Component Value Date    WBC 4.96 04/19/2023    HGB 15.1 04/19/2023    HCT 44.7 04/19/2023    MCV 90 04/19/2023     04/19/2023     BMP  Sodium   Date Value Ref Range Status   05/24/2023 138 136 - 145 mmol/L Final     Potassium   Date Value Ref Range Status   05/24/2023 4.2 3.5 - 5.1 mmol/L Final     Chloride   Date Value Ref Range Status   05/24/2023 106 95 - 110 mmol/L Final     CO2   Date Value Ref Range Status   05/24/2023 26 23 - 29 mmol/L Final     BUN   Date Value Ref Range Status   05/24/2023 17 10 - 30 mg/dL Final     Creatinine   Date Value Ref Range Status   05/24/2023 0.9 0.5 - 1.4 mg/dL Final     Calcium   Date Value Ref Range Status   05/24/2023 9.1 8.7 - 10.5 mg/dL Final     Anion Gap   Date Value Ref Range Status   05/24/2023 6  (L) 8 - 16 mmol/L Final     eGFR if    Date Value Ref Range Status   01/07/2022 >60 >60 mL/min/1.73 m^2 Final     eGFR if non    Date Value Ref Range Status   01/07/2022 >60 >60 mL/min/1.73 m^2 Final     Comment:     Calculation used to obtain the estimated glomerular filtration  rate (eGFR) is the CKD-EPI equation.        BNP  @LABRCNTIP(BNP,BNPTRIAGEBLO)@  @LABRCNTIP(troponini)@  CrCl cannot be calculated (Patient's most recent lab result is older than the maximum 7 days allowed.).  No results found in the last 24 hours.  No results found in the last 24 hours.  No results found in the last 24 hours.    Assessment:      1. Chronic diastolic congestive heart failure    2. Diastolic dysfunction    3. Paroxysmal atrial fibrillation    4. Atherosclerosis of aorta    5. Pleural effusion        Plan:   Add losartan 25 mg daily for HTN  Continue Lasix 20 mg daily  BNP and BMP in 4 weeks  RTC in 4 m

## 2023-11-09 DIAGNOSIS — R00.1 BRADYCARDIA: Primary | ICD-10-CM

## 2023-11-09 DIAGNOSIS — I48.0 PAROXYSMAL ATRIAL FIBRILLATION: ICD-10-CM

## 2023-11-26 DIAGNOSIS — M25.511 PAIN OF BOTH SHOULDER JOINTS: ICD-10-CM

## 2023-11-26 DIAGNOSIS — M25.562 ACUTE PAIN OF BOTH KNEES: ICD-10-CM

## 2023-11-26 DIAGNOSIS — M25.512 PAIN OF BOTH SHOULDER JOINTS: ICD-10-CM

## 2023-11-26 DIAGNOSIS — M25.561 ACUTE PAIN OF BOTH KNEES: ICD-10-CM

## 2023-11-29 RX ORDER — MELOXICAM 15 MG/1
15 TABLET ORAL DAILY PRN
Qty: 30 TABLET | Refills: 5 | Status: SHIPPED | OUTPATIENT
Start: 2023-11-29 | End: 2024-01-11 | Stop reason: SDUPTHER

## 2023-12-06 ENCOUNTER — HOSPITAL ENCOUNTER (OUTPATIENT)
Dept: RADIOLOGY | Facility: HOSPITAL | Age: 88
Discharge: HOME | End: 2023-12-06
Attending: PHYSICIAN ASSISTANT
Payer: MEDICARE

## 2023-12-06 ENCOUNTER — OFFICE VISIT (OUTPATIENT)
Dept: INTERNAL MEDICINE | Facility: CLINIC | Age: 88
End: 2023-12-06
Payer: MEDICARE

## 2023-12-06 ENCOUNTER — TELEPHONE (OUTPATIENT)
Dept: INTERNAL MEDICINE | Facility: CLINIC | Age: 88
End: 2023-12-06

## 2023-12-06 VITALS
SYSTOLIC BLOOD PRESSURE: 122 MMHG | HEART RATE: 80 BPM | DIASTOLIC BLOOD PRESSURE: 76 MMHG | RESPIRATION RATE: 18 BRPM | TEMPERATURE: 97 F | OXYGEN SATURATION: 98 %

## 2023-12-06 DIAGNOSIS — H53.8 BLURRY VISION: ICD-10-CM

## 2023-12-06 DIAGNOSIS — R06.02 SHORTNESS OF BREATH: ICD-10-CM

## 2023-12-06 DIAGNOSIS — I50.32 CHRONIC DIASTOLIC CONGESTIVE HEART FAILURE: Primary | ICD-10-CM

## 2023-12-06 DIAGNOSIS — I27.9 PULMONARY HEART DISEASE: ICD-10-CM

## 2023-12-06 DIAGNOSIS — I48.0 PAROXYSMAL ATRIAL FIBRILLATION: ICD-10-CM

## 2023-12-06 DIAGNOSIS — I70.0 ATHEROSCLEROSIS OF AORTA: ICD-10-CM

## 2023-12-06 DIAGNOSIS — H04.203 EYE TEARING, BILATERAL: ICD-10-CM

## 2023-12-06 DIAGNOSIS — Z23 NEED FOR VACCINATION: ICD-10-CM

## 2023-12-06 PROCEDURE — 1160F PR REVIEW ALL MEDS BY PRESCRIBER/CLIN PHARMACIST DOCUMENTED: ICD-10-PCS | Mod: HCNC,CPTII,S$GLB, | Performed by: PHYSICIAN ASSISTANT

## 2023-12-06 PROCEDURE — 99999 PR PBB SHADOW E&M-EST. PATIENT-LVL III: ICD-10-PCS | Mod: PBBFAC,HCNC,, | Performed by: PHYSICIAN ASSISTANT

## 2023-12-06 PROCEDURE — 99214 PR OFFICE/OUTPT VISIT, EST, LEVL IV, 30-39 MIN: ICD-10-PCS | Mod: 25,HCNC,S$GLB, | Performed by: PHYSICIAN ASSISTANT

## 2023-12-06 PROCEDURE — 99999 PR PBB SHADOW E&M-EST. PATIENT-LVL III: CPT | Mod: PBBFAC,HCNC,, | Performed by: PHYSICIAN ASSISTANT

## 2023-12-06 PROCEDURE — 71046 XR CHEST PA AND LATERAL: ICD-10-PCS | Mod: 26,HCNC,, | Performed by: RADIOLOGY

## 2023-12-06 PROCEDURE — 71046 X-RAY EXAM CHEST 2 VIEWS: CPT | Mod: 26,HCNC,, | Performed by: RADIOLOGY

## 2023-12-06 PROCEDURE — 1160F RVW MEDS BY RX/DR IN RCRD: CPT | Mod: HCNC,CPTII,S$GLB, | Performed by: PHYSICIAN ASSISTANT

## 2023-12-06 PROCEDURE — 99214 OFFICE O/P EST MOD 30 MIN: CPT | Mod: 25,HCNC,S$GLB, | Performed by: PHYSICIAN ASSISTANT

## 2023-12-06 PROCEDURE — 71046 X-RAY EXAM CHEST 2 VIEWS: CPT | Mod: TC,HCNC

## 2023-12-06 PROCEDURE — 90694 VACC AIIV4 NO PRSRV 0.5ML IM: CPT | Mod: HCNC,S$GLB,, | Performed by: PHYSICIAN ASSISTANT

## 2023-12-06 PROCEDURE — 1159F MED LIST DOCD IN RCRD: CPT | Mod: HCNC,CPTII,S$GLB, | Performed by: PHYSICIAN ASSISTANT

## 2023-12-06 PROCEDURE — 3288F PR FALLS RISK ASSESSMENT DOCUMENTED: ICD-10-PCS | Mod: HCNC,CPTII,S$GLB, | Performed by: PHYSICIAN ASSISTANT

## 2023-12-06 PROCEDURE — 1101F PT FALLS ASSESS-DOCD LE1/YR: CPT | Mod: HCNC,CPTII,S$GLB, | Performed by: PHYSICIAN ASSISTANT

## 2023-12-06 PROCEDURE — G0008 ADMIN INFLUENZA VIRUS VAC: HCPCS | Mod: HCNC,S$GLB,, | Performed by: PHYSICIAN ASSISTANT

## 2023-12-06 PROCEDURE — 90694 FLU VACCINE - QUADRIVALENT - ADJUVANTED: ICD-10-PCS | Mod: HCNC,S$GLB,, | Performed by: PHYSICIAN ASSISTANT

## 2023-12-06 PROCEDURE — 3288F FALL RISK ASSESSMENT DOCD: CPT | Mod: HCNC,CPTII,S$GLB, | Performed by: PHYSICIAN ASSISTANT

## 2023-12-06 PROCEDURE — G0008 FLU VACCINE - QUADRIVALENT - ADJUVANTED: ICD-10-PCS | Mod: HCNC,S$GLB,, | Performed by: PHYSICIAN ASSISTANT

## 2023-12-06 PROCEDURE — 1159F PR MEDICATION LIST DOCUMENTED IN MEDICAL RECORD: ICD-10-PCS | Mod: HCNC,CPTII,S$GLB, | Performed by: PHYSICIAN ASSISTANT

## 2023-12-06 PROCEDURE — 1101F PR PT FALLS ASSESS DOC 0-1 FALLS W/OUT INJ PAST YR: ICD-10-PCS | Mod: HCNC,CPTII,S$GLB, | Performed by: PHYSICIAN ASSISTANT

## 2023-12-06 RX ORDER — ALBUTEROL SULFATE 0.63 MG/3ML
0.63 SOLUTION RESPIRATORY (INHALATION) EVERY 6 HOURS PRN
Qty: 75 ML | Refills: 0 | Status: SHIPPED | OUTPATIENT
Start: 2023-12-06 | End: 2024-01-11 | Stop reason: SDUPTHER

## 2023-12-06 NOTE — PROGRESS NOTES
Subjective:      Patient ID: Long Albarran is a 89 y.o. male.    Chief Complaint: Annual Exam (Patient complains of back pain and occasional shortness of breath. )    Patient is known to me, being able to seen today for 6mth f/u.     HTN- losartan 25mg, lasix 20mg     Due for labs     Visit with assistance of John  and daughter     Reports teary eyes x3-4mths  Denies pain or itching, vision is blurry     Occasional shortness of breath/fatigue, ongoing for at least 1yr per patient   Denies cough   Reports occasional chest and back pain when SOB  H/o bilateral pleural effusion     Followed by Cardiology for CHF, a-fib, pulmonary heart disease   Reports compliance with medication  Due for f/u    Last visit May 2023 with PCP.      Review of Systems   Constitutional:  Negative for chills, diaphoresis and fever.   HENT:  Negative for congestion, rhinorrhea and sore throat.    Eyes:  Positive for discharge (watery).   Respiratory:  Positive for shortness of breath. Negative for cough and wheezing.    Cardiovascular:  Positive for chest pain. Negative for leg swelling.   Gastrointestinal:  Negative for abdominal pain, constipation, diarrhea, nausea and vomiting.   Skin:  Negative for rash.   Neurological:  Negative for dizziness, light-headedness and headaches.       Objective:   /76 (BP Location: Right arm, Patient Position: Sitting, BP Method: Large (Manual))   Pulse 80   Temp 97.1 °F (36.2 °C)   Resp 18   SpO2 98%   Physical Exam  Constitutional:       General: He is not in acute distress.     Appearance: Normal appearance. He is well-developed. He is not ill-appearing.   HENT:      Head: Normocephalic and atraumatic.   Cardiovascular:      Rate and Rhythm: Normal rate and regular rhythm.      Heart sounds: Normal heart sounds. No murmur heard.  Pulmonary:      Effort: Pulmonary effort is normal. No respiratory distress.      Breath sounds: Normal breath sounds. No decreased breath sounds.    Musculoskeletal:      Right lower leg: No edema.      Left lower leg: No edema.   Skin:     General: Skin is warm and dry.      Findings: No rash.   Psychiatric:         Speech: Speech normal.         Behavior: Behavior normal.         Thought Content: Thought content normal.       Assessment:      1. Chronic diastolic congestive heart failure    2. Atherosclerosis of aorta    3. Paroxysmal atrial fibrillation    4. Shortness of breath    5. Eye tearing, bilateral    6. Blurry vision    7. Pulmonary heart disease       Plan:   Chronic diastolic congestive heart failure  -     CBC Auto Differential; Future; Expected date: 12/06/2023  -     Comprehensive Metabolic Panel; Future; Expected date: 12/06/2023    Atherosclerosis of aorta  -     Lipid Panel; Future; Expected date: 12/06/2023    Paroxysmal atrial fibrillation    Shortness of breath  -     X-Ray Chest PA And Lateral; Future; Expected date: 12/06/2023  -     albuterol (ACCUNEB) 0.63 mg/3 mL Nebu; Take 3 mLs (0.63 mg total) by nebulization every 6 (six) hours as needed (wheezing or shortness of breath).  Dispense: 75 mL; Refill: 0    Eye tearing, bilateral  -     Ambulatory referral/consult to Ophthalmology; Future; Expected date: 12/13/2023    Blurry vision  -     Ambulatory referral/consult to Ophthalmology; Future; Expected date: 12/13/2023    Pulmonary heart disease      Card f/u to be scheduled     Fasting labs     6mth f/u for annual

## 2023-12-13 ENCOUNTER — LAB VISIT (OUTPATIENT)
Dept: LAB | Facility: HOSPITAL | Age: 88
End: 2023-12-13
Attending: PHYSICIAN ASSISTANT
Payer: MEDICARE

## 2023-12-13 DIAGNOSIS — I50.32 CHRONIC DIASTOLIC CONGESTIVE HEART FAILURE: ICD-10-CM

## 2023-12-13 DIAGNOSIS — I70.0 ATHEROSCLEROSIS OF AORTA: ICD-10-CM

## 2023-12-13 LAB
ALBUMIN SERPL BCP-MCNC: 3.8 G/DL (ref 3.5–5.2)
ALP SERPL-CCNC: 72 U/L (ref 55–135)
ALT SERPL W/O P-5'-P-CCNC: 36 U/L (ref 10–44)
ANION GAP SERPL CALC-SCNC: 8 MMOL/L (ref 8–16)
AST SERPL-CCNC: 49 U/L (ref 10–40)
BASOPHILS # BLD AUTO: 0.04 K/UL (ref 0–0.2)
BASOPHILS NFR BLD: 0.7 % (ref 0–1.9)
BILIRUB SERPL-MCNC: 0.9 MG/DL (ref 0.1–1)
BUN SERPL-MCNC: 18 MG/DL (ref 10–30)
CALCIUM SERPL-MCNC: 8.9 MG/DL (ref 8.7–10.5)
CHLORIDE SERPL-SCNC: 106 MMOL/L (ref 95–110)
CHOLEST SERPL-MCNC: 191 MG/DL (ref 120–199)
CHOLEST/HDLC SERPL: 4.9 {RATIO} (ref 2–5)
CO2 SERPL-SCNC: 27 MMOL/L (ref 23–29)
CREAT SERPL-MCNC: 1 MG/DL (ref 0.5–1.4)
DIFFERENTIAL METHOD: ABNORMAL
EOSINOPHIL # BLD AUTO: 0.1 K/UL (ref 0–0.5)
EOSINOPHIL NFR BLD: 2.3 % (ref 0–8)
ERYTHROCYTE [DISTWIDTH] IN BLOOD BY AUTOMATED COUNT: 11.7 % (ref 11.5–14.5)
EST. GFR  (NO RACE VARIABLE): >60 ML/MIN/1.73 M^2
GLUCOSE SERPL-MCNC: 87 MG/DL (ref 70–110)
HCT VFR BLD AUTO: 41.3 % (ref 40–54)
HDLC SERPL-MCNC: 39 MG/DL (ref 40–75)
HDLC SERPL: 20.4 % (ref 20–50)
HGB BLD-MCNC: 14.8 G/DL (ref 14–18)
IMM GRANULOCYTES # BLD AUTO: 0.01 K/UL (ref 0–0.04)
IMM GRANULOCYTES NFR BLD AUTO: 0.2 % (ref 0–0.5)
LDLC SERPL CALC-MCNC: 116.8 MG/DL (ref 63–159)
LYMPHOCYTES # BLD AUTO: 1.7 K/UL (ref 1–4.8)
LYMPHOCYTES NFR BLD: 29.9 % (ref 18–48)
MCH RBC QN AUTO: 31.3 PG (ref 27–31)
MCHC RBC AUTO-ENTMCNC: 35.8 G/DL (ref 32–36)
MCV RBC AUTO: 87 FL (ref 82–98)
MONOCYTES # BLD AUTO: 0.7 K/UL (ref 0.3–1)
MONOCYTES NFR BLD: 12.8 % (ref 4–15)
NEUTROPHILS # BLD AUTO: 3.1 K/UL (ref 1.8–7.7)
NEUTROPHILS NFR BLD: 54.1 % (ref 38–73)
NONHDLC SERPL-MCNC: 152 MG/DL
NRBC BLD-RTO: 0 /100 WBC
PLATELET # BLD AUTO: 172 K/UL (ref 150–450)
PMV BLD AUTO: 11.7 FL (ref 9.2–12.9)
POTASSIUM SERPL-SCNC: 4.3 MMOL/L (ref 3.5–5.1)
PROT SERPL-MCNC: 7.5 G/DL (ref 6–8.4)
RBC # BLD AUTO: 4.73 M/UL (ref 4.6–6.2)
SODIUM SERPL-SCNC: 141 MMOL/L (ref 136–145)
TRIGL SERPL-MCNC: 176 MG/DL (ref 30–150)
WBC # BLD AUTO: 5.76 K/UL (ref 3.9–12.7)

## 2023-12-13 PROCEDURE — 80053 COMPREHEN METABOLIC PANEL: CPT | Mod: HCNC | Performed by: PHYSICIAN ASSISTANT

## 2023-12-13 PROCEDURE — 80061 LIPID PANEL: CPT | Mod: HCNC | Performed by: PHYSICIAN ASSISTANT

## 2023-12-13 PROCEDURE — 85025 COMPLETE CBC W/AUTO DIFF WBC: CPT | Mod: HCNC | Performed by: PHYSICIAN ASSISTANT

## 2023-12-13 PROCEDURE — 36415 COLL VENOUS BLD VENIPUNCTURE: CPT | Mod: HCNC | Performed by: PHYSICIAN ASSISTANT

## 2023-12-26 ENCOUNTER — TELEPHONE (OUTPATIENT)
Dept: OPHTHALMOLOGY | Facility: CLINIC | Age: 88
End: 2023-12-26
Payer: MEDICARE

## 2023-12-26 NOTE — TELEPHONE ENCOUNTER
Appt scheduled ----- Message from Diane Guevara sent at 12/26/2023 11:08 AM CST -----  Contact: [phone]    Patient: Long Albarran-    Reason: The patient is requesting a call back from the nurse to get assistance with rescheduling an     appointment for Blurry vision any day this week, if possible.    Comments: Please call the patient back to advise.

## 2023-12-27 ENCOUNTER — OFFICE VISIT (OUTPATIENT)
Dept: OPHTHALMOLOGY | Facility: CLINIC | Age: 88
End: 2023-12-27
Payer: MEDICARE

## 2023-12-27 ENCOUNTER — TELEPHONE (OUTPATIENT)
Dept: INTERNAL MEDICINE | Facility: CLINIC | Age: 88
End: 2023-12-27
Payer: MEDICARE

## 2023-12-27 DIAGNOSIS — H10.33 ACUTE BACTERIAL CONJUNCTIVITIS OF BOTH EYES: ICD-10-CM

## 2023-12-27 DIAGNOSIS — H04.123 DRY EYES, BILATERAL: Primary | ICD-10-CM

## 2023-12-27 PROCEDURE — 1159F PR MEDICATION LIST DOCUMENTED IN MEDICAL RECORD: ICD-10-PCS | Mod: HCNC,CPTII,S$GLB, | Performed by: OPTOMETRIST

## 2023-12-27 PROCEDURE — 99999 PR PBB SHADOW E&M-EST. PATIENT-LVL II: CPT | Mod: PBBFAC,HCNC,, | Performed by: OPTOMETRIST

## 2023-12-27 PROCEDURE — 92002 PR EYE EXAM, NEW PATIENT,INTERMED: ICD-10-PCS | Mod: HCNC,S$GLB,, | Performed by: OPTOMETRIST

## 2023-12-27 PROCEDURE — 92002 INTRM OPH EXAM NEW PATIENT: CPT | Mod: HCNC,S$GLB,, | Performed by: OPTOMETRIST

## 2023-12-27 PROCEDURE — 99999 PR PBB SHADOW E&M-EST. PATIENT-LVL II: ICD-10-PCS | Mod: PBBFAC,HCNC,, | Performed by: OPTOMETRIST

## 2023-12-27 PROCEDURE — 1159F MED LIST DOCD IN RCRD: CPT | Mod: HCNC,CPTII,S$GLB, | Performed by: OPTOMETRIST

## 2023-12-27 RX ORDER — ERYTHROMYCIN 5 MG/G
OINTMENT OPHTHALMIC 3 TIMES DAILY
Qty: 1 EACH | Refills: 6 | Status: SHIPPED | OUTPATIENT
Start: 2023-12-27 | End: 2024-01-10

## 2023-12-27 RX ORDER — NEOMYCIN SULFATE, POLYMYXIN B SULFATE AND DEXAMETHASONE 3.5; 10000; 1 MG/ML; [USP'U]/ML; MG/ML
1 SUSPENSION/ DROPS OPHTHALMIC 4 TIMES DAILY
Qty: 10 ML | Refills: 0 | Status: SHIPPED | OUTPATIENT
Start: 2023-12-27 | End: 2024-01-10

## 2023-12-27 NOTE — TELEPHONE ENCOUNTER
----- Message from Sania Godinez sent at 12/27/2023  2:40 PM CST -----  Contact: karen Pichardo is requesting orders for MRI of patient's head. Please call him back at [phone].      Thanks  DD

## 2023-12-27 NOTE — PROGRESS NOTES
SUBJECTIVE  Long Van Hines is 89 y.o. male  Corrected distance visual acuity was 20/200 in the right eye and 20/200 in the left eye.   Chief Complaint   Patient presents with    Eye Problem     Pt states both eyes are blurry and he has a lot of white stuff all over his left eyes ball.  Pt states he used the restroom and when he came out both eyes was blurry.Pt just got new glasses and doesn't need new glasses.daughter use OTC red itchiny eye drops.           HPI     Eye Problem     Additional comments: Pt states both eyes are blurry and he has a lot of   white stuff all over his left eyes ball.  Pt states he used the restroom   and when he came out both eyes was blurry.Pt just got new glasses and   doesn't need new glasses.daughter use OTC red itchiny eye drops.            Comments    1. PCIOL 2021- OU           Last edited by Ilene Barron on 12/27/2023  1:53 PM.         Assessment /Plan :  1. Dry eyes, bilateral   See number 2     2. Acute bacterial conjunctivitis of both eyes   Chronic dry eye and recent keratoconjunctivitis, kerititis OU  Start Maxitrol gtts and Emycin gtts x 2 weeks  RTC 4 weeks recheck DFE full exam  Contact PCP if headaches continue

## 2024-01-11 ENCOUNTER — OFFICE VISIT (OUTPATIENT)
Dept: INTERNAL MEDICINE | Facility: CLINIC | Age: 89
End: 2024-01-11
Payer: MEDICARE

## 2024-01-11 VITALS
HEIGHT: 63 IN | DIASTOLIC BLOOD PRESSURE: 64 MMHG | TEMPERATURE: 98 F | WEIGHT: 134.94 LBS | HEART RATE: 65 BPM | BODY MASS INDEX: 23.91 KG/M2 | SYSTOLIC BLOOD PRESSURE: 124 MMHG | RESPIRATION RATE: 18 BRPM | OXYGEN SATURATION: 96 %

## 2024-01-11 DIAGNOSIS — M25.511 PAIN OF BOTH SHOULDER JOINTS: Primary | ICD-10-CM

## 2024-01-11 DIAGNOSIS — M25.512 PAIN OF BOTH SHOULDER JOINTS: Primary | ICD-10-CM

## 2024-01-11 DIAGNOSIS — Z00.00 ENCOUNTER FOR MEDICARE ANNUAL WELLNESS EXAM: ICD-10-CM

## 2024-01-11 DIAGNOSIS — R06.02 SHORTNESS OF BREATH: ICD-10-CM

## 2024-01-11 PROBLEM — M25.562 ACUTE PAIN OF BOTH KNEES: Status: ACTIVE | Noted: 2024-01-11

## 2024-01-11 PROBLEM — M25.561 ACUTE PAIN OF BOTH KNEES: Status: ACTIVE | Noted: 2024-01-11

## 2024-01-11 PROCEDURE — 99999 PR PBB SHADOW E&M-EST. PATIENT-LVL IV: CPT | Mod: PBBFAC,HCNC,, | Performed by: INTERNAL MEDICINE

## 2024-01-11 PROCEDURE — 1101F PT FALLS ASSESS-DOCD LE1/YR: CPT | Mod: HCNC,CPTII,S$GLB, | Performed by: INTERNAL MEDICINE

## 2024-01-11 PROCEDURE — 99214 OFFICE O/P EST MOD 30 MIN: CPT | Mod: HCNC,S$GLB,, | Performed by: INTERNAL MEDICINE

## 2024-01-11 PROCEDURE — 1126F AMNT PAIN NOTED NONE PRSNT: CPT | Mod: HCNC,CPTII,S$GLB, | Performed by: INTERNAL MEDICINE

## 2024-01-11 PROCEDURE — 1160F RVW MEDS BY RX/DR IN RCRD: CPT | Mod: HCNC,CPTII,S$GLB, | Performed by: INTERNAL MEDICINE

## 2024-01-11 PROCEDURE — 3288F FALL RISK ASSESSMENT DOCD: CPT | Mod: HCNC,CPTII,S$GLB, | Performed by: INTERNAL MEDICINE

## 2024-01-11 PROCEDURE — 1159F MED LIST DOCD IN RCRD: CPT | Mod: HCNC,CPTII,S$GLB, | Performed by: INTERNAL MEDICINE

## 2024-01-11 RX ORDER — ALBUTEROL SULFATE 0.63 MG/3ML
0.63 SOLUTION RESPIRATORY (INHALATION) EVERY 6 HOURS PRN
Qty: 75 ML | Refills: 5 | Status: SHIPPED | OUTPATIENT
Start: 2024-01-11

## 2024-01-11 RX ORDER — MELOXICAM 15 MG/1
15 TABLET ORAL DAILY PRN
Qty: 30 TABLET | Refills: 5 | Status: SHIPPED | OUTPATIENT
Start: 2024-01-11

## 2024-01-11 NOTE — PROGRESS NOTES
Long Albarran  89 y.o.      Other male  [id number]    Chief Complaint:  Chief Complaint   Patient presents with    Follow-up     Medication refills       History of Present Illness:  Presents to the clinic for medication refills. He is here with his daughter. She provides the history and translates.   He is in need of refills on meloxicam and albuterol.   He takes meloxicam for shoulder/neck pain and albuterol for shortness of breath.   Recent labs indicate normal kidney function.   Lab Results   Component Value Date    CREATININE 1.0 12/13/2023    BUN 18 12/13/2023     12/13/2023    K 4.3 12/13/2023     12/13/2023    CO2 27 12/13/2023     History:  Past Medical History:   Diagnosis Date    Diastolic dysfunction 12/2017    Hypertriglyceridemia        Current Outpatient Medications:     albuterol (ACCUNEB) 0.63 mg/3 mL Nebu, Take 3 mLs (0.63 mg total) by nebulization every 6 (six) hours as needed (wheezing or shortness of breath)., Disp: 75 mL, Rfl: 5    meloxicam (MOBIC) 15 MG tablet, Take 1 tablet (15 mg total) by mouth daily as needed for Pain., Disp: 30 tablet, Rfl: 5    Allergies:  Review of patient's allergies indicates:  No Known Allergies    Review of Systems   Constitutional:  Negative for fever.   Eyes:  Positive for redness.   Respiratory:  Positive for shortness of breath.    Cardiovascular:  Negative for leg swelling.   Musculoskeletal:  Positive for joint pain and neck pain.   Neurological:  Positive for headaches (with coughing).       Exam:  Vitals:    01/11/24 1128   BP: 124/64   Pulse: 65   Resp: 18   Temp: 98.4 °F (36.9 °C)     Weight: 61.2 kg (134 lb 14.7 oz)   Body mass index is 23.9 kg/m².      Physical Exam  Vitals reviewed.   Constitutional:       General: He is not in acute distress.     Appearance: He is well-developed. He is not ill-appearing.   Eyes:      General: No scleral icterus.  Cardiovascular:      Rate and Rhythm: Normal rate and regular rhythm.      Heart sounds:  Normal heart sounds.   Pulmonary:      Effort: Pulmonary effort is normal. No respiratory distress.      Breath sounds: Normal breath sounds.   Musculoskeletal:      Right lower leg: No edema.      Left lower leg: No edema.   Skin:     General: Skin is warm and dry.   Neurological:      Mental Status: He is alert and oriented to person, place, and time.   Psychiatric:         Behavior: Behavior normal.     Assessment:  The primary encounter diagnosis was Pain of both shoulder joints. A diagnosis of Shortness of breath was also pertinent to this visit.    Plan:  Pain of both shoulder joints  -     meloxicam (MOBIC) 15 MG tablet; Take 1 tablet (15 mg total) by mouth daily as needed for Pain.  Dispense: 30 tablet; Refill: 5    Shortness of breath  -     albuterol (ACCUNEB) 0.63 mg/3 mL Nebu; Take 3 mLs (0.63 mg total) by nebulization every 6 (six) hours as needed (wheezing or shortness of breath).  Dispense: 75 mL; Refill: 5    RTC in June for annual exam.

## 2024-01-21 ENCOUNTER — HOSPITAL ENCOUNTER (INPATIENT)
Facility: HOSPITAL | Age: 89
LOS: 3 days | Discharge: HOME HEALTH | DRG: 178 | End: 2024-01-26
Attending: EMERGENCY MEDICINE | Admitting: FAMILY MEDICINE
Payer: MEDICARE

## 2024-01-21 DIAGNOSIS — I49.8 BRADYARRHYTHMIA: ICD-10-CM

## 2024-01-21 DIAGNOSIS — I48.0 PAROXYSMAL ATRIAL FIBRILLATION: ICD-10-CM

## 2024-01-21 DIAGNOSIS — R06.02 SOB (SHORTNESS OF BREATH): ICD-10-CM

## 2024-01-21 DIAGNOSIS — U07.1 COVID-19: Primary | ICD-10-CM

## 2024-01-21 DIAGNOSIS — R07.9 CHEST PAIN: ICD-10-CM

## 2024-01-21 DIAGNOSIS — R00.1 BRADYCARDIA: ICD-10-CM

## 2024-01-21 DIAGNOSIS — I44.1 MOBITZ TYPE 1 SECOND DEGREE ATRIOVENTRICULAR BLOCK: ICD-10-CM

## 2024-01-21 DIAGNOSIS — I21.4 NSTEMI (NON-ST ELEVATED MYOCARDIAL INFARCTION): ICD-10-CM

## 2024-01-21 LAB
ALBUMIN SERPL BCP-MCNC: 3.4 G/DL (ref 3.5–5.2)
ALP SERPL-CCNC: 71 U/L (ref 55–135)
ALT SERPL W/O P-5'-P-CCNC: 39 U/L (ref 10–44)
ANION GAP SERPL CALC-SCNC: 7 MMOL/L (ref 8–16)
AST SERPL-CCNC: 61 U/L (ref 10–40)
BACTERIA #/AREA URNS HPF: NORMAL /HPF
BILIRUB SERPL-MCNC: 0.8 MG/DL (ref 0.1–1)
BILIRUB UR QL STRIP: NEGATIVE
BNP SERPL-MCNC: 354 PG/ML (ref 0–99)
BUN SERPL-MCNC: 20 MG/DL (ref 10–30)
CALCIUM SERPL-MCNC: 8.8 MG/DL (ref 8.7–10.5)
CHLORIDE SERPL-SCNC: 107 MMOL/L (ref 95–110)
CLARITY UR: CLEAR
CO2 SERPL-SCNC: 23 MMOL/L (ref 23–29)
COLOR UR: YELLOW
CREAT SERPL-MCNC: 0.9 MG/DL (ref 0.5–1.4)
EST. GFR  (NO RACE VARIABLE): >60 ML/MIN/1.73 M^2
GLUCOSE SERPL-MCNC: 105 MG/DL (ref 70–110)
GLUCOSE UR QL STRIP: NEGATIVE
HGB UR QL STRIP: NEGATIVE
HYALINE CASTS #/AREA URNS LPF: 0 /LPF
INFLUENZA A, MOLECULAR: NEGATIVE
INFLUENZA B, MOLECULAR: NEGATIVE
KETONES UR QL STRIP: NEGATIVE
LACTATE SERPL-SCNC: 0.8 MMOL/L (ref 0.5–2.2)
LEUKOCYTE ESTERASE UR QL STRIP: NEGATIVE
MAGNESIUM SERPL-MCNC: 1.8 MG/DL (ref 1.6–2.6)
MICROSCOPIC COMMENT: NORMAL
NITRITE UR QL STRIP: NEGATIVE
PH UR STRIP: 6 [PH] (ref 5–8)
POTASSIUM SERPL-SCNC: 4.9 MMOL/L (ref 3.5–5.1)
PROCALCITONIN SERPL IA-MCNC: 0.14 NG/ML
PROT SERPL-MCNC: 7.7 G/DL (ref 6–8.4)
PROT UR QL STRIP: ABNORMAL
RBC #/AREA URNS HPF: 3 /HPF (ref 0–4)
SARS-COV-2 RDRP RESP QL NAA+PROBE: POSITIVE
SODIUM SERPL-SCNC: 137 MMOL/L (ref 136–145)
SP GR UR STRIP: 1.02 (ref 1–1.03)
SPECIMEN SOURCE: NORMAL
TROPONIN I SERPL DL<=0.01 NG/ML-MCNC: 0.07 NG/ML (ref 0–0.03)
TROPONIN I SERPL DL<=0.01 NG/ML-MCNC: 0.08 NG/ML (ref 0–0.03)
TROPONIN I SERPL DL<=0.01 NG/ML-MCNC: 0.1 NG/ML (ref 0–0.03)
TSH SERPL DL<=0.005 MIU/L-ACNC: 0.89 UIU/ML (ref 0.4–4)
URN SPEC COLLECT METH UR: ABNORMAL
UROBILINOGEN UR STRIP-ACNC: NEGATIVE EU/DL
WBC #/AREA URNS HPF: 0 /HPF (ref 0–5)

## 2024-01-21 PROCEDURE — 96372 THER/PROPH/DIAG INJ SC/IM: CPT | Performed by: FAMILY MEDICINE

## 2024-01-21 PROCEDURE — 25000003 PHARM REV CODE 250: Mod: HCNC | Performed by: FAMILY MEDICINE

## 2024-01-21 PROCEDURE — 83605 ASSAY OF LACTIC ACID: CPT | Mod: HCNC | Performed by: EMERGENCY MEDICINE

## 2024-01-21 PROCEDURE — 99223 1ST HOSP IP/OBS HIGH 75: CPT | Mod: 25,HCNC,, | Performed by: INTERNAL MEDICINE

## 2024-01-21 PROCEDURE — U0002 COVID-19 LAB TEST NON-CDC: HCPCS | Mod: HCNC | Performed by: EMERGENCY MEDICINE

## 2024-01-21 PROCEDURE — 84484 ASSAY OF TROPONIN QUANT: CPT | Mod: HCNC | Performed by: EMERGENCY MEDICINE

## 2024-01-21 PROCEDURE — 94640 AIRWAY INHALATION TREATMENT: CPT | Mod: HCNC

## 2024-01-21 PROCEDURE — 80053 COMPREHEN METABOLIC PANEL: CPT | Mod: HCNC | Performed by: EMERGENCY MEDICINE

## 2024-01-21 PROCEDURE — 81000 URINALYSIS NONAUTO W/SCOPE: CPT | Mod: HCNC | Performed by: EMERGENCY MEDICINE

## 2024-01-21 PROCEDURE — 25000242 PHARM REV CODE 250 ALT 637 W/ HCPCS: Mod: HCNC | Performed by: EMERGENCY MEDICINE

## 2024-01-21 PROCEDURE — 93010 ELECTROCARDIOGRAM REPORT: CPT | Mod: HCNC,,, | Performed by: INTERNAL MEDICINE

## 2024-01-21 PROCEDURE — 84484 ASSAY OF TROPONIN QUANT: CPT | Mod: 91,HCNC | Performed by: NURSE PRACTITIONER

## 2024-01-21 PROCEDURE — 84145 PROCALCITONIN (PCT): CPT | Mod: HCNC | Performed by: NURSE PRACTITIONER

## 2024-01-21 PROCEDURE — G0378 HOSPITAL OBSERVATION PER HR: HCPCS | Mod: HCNC

## 2024-01-21 PROCEDURE — 84443 ASSAY THYROID STIM HORMONE: CPT | Mod: HCNC | Performed by: EMERGENCY MEDICINE

## 2024-01-21 PROCEDURE — 87502 INFLUENZA DNA AMP PROBE: CPT | Mod: HCNC | Performed by: EMERGENCY MEDICINE

## 2024-01-21 PROCEDURE — 99285 EMERGENCY DEPT VISIT HI MDM: CPT | Mod: 25,HCNC

## 2024-01-21 PROCEDURE — 93005 ELECTROCARDIOGRAM TRACING: CPT | Mod: HCNC

## 2024-01-21 PROCEDURE — 63600175 PHARM REV CODE 636 W HCPCS: Mod: HCNC | Performed by: FAMILY MEDICINE

## 2024-01-21 PROCEDURE — 83735 ASSAY OF MAGNESIUM: CPT | Mod: HCNC | Performed by: EMERGENCY MEDICINE

## 2024-01-21 PROCEDURE — 83880 ASSAY OF NATRIURETIC PEPTIDE: CPT | Mod: HCNC | Performed by: EMERGENCY MEDICINE

## 2024-01-21 PROCEDURE — 84484 ASSAY OF TROPONIN QUANT: CPT | Mod: 91,HCNC | Performed by: FAMILY MEDICINE

## 2024-01-21 RX ORDER — IPRATROPIUM BROMIDE AND ALBUTEROL SULFATE 2.5; .5 MG/3ML; MG/3ML
3 SOLUTION RESPIRATORY (INHALATION)
Status: COMPLETED | OUTPATIENT
Start: 2024-01-21 | End: 2024-01-21

## 2024-01-21 RX ORDER — PROCHLORPERAZINE EDISYLATE 5 MG/ML
5 INJECTION INTRAMUSCULAR; INTRAVENOUS EVERY 6 HOURS PRN
Status: DISCONTINUED | OUTPATIENT
Start: 2024-01-21 | End: 2024-01-26 | Stop reason: HOSPADM

## 2024-01-21 RX ORDER — IBUPROFEN 200 MG
24 TABLET ORAL
Status: DISCONTINUED | OUTPATIENT
Start: 2024-01-21 | End: 2024-01-26 | Stop reason: HOSPADM

## 2024-01-21 RX ORDER — INFLUENZA A VIRUS A/VICTORIA/4897/2022 IVR-238 (H1N1) ANTIGEN (FORMALDEHYDE INACTIVATED), INFLUENZA A VIRUS A/DARWIN/6/2021 IVR-227 (H3N2) ANTIGEN (FORMALDEHYDE INACTIVATED), INFLUENZA B VIRUS B/AUSTRIA/1359417/2021 BVR-26 ANTIGEN (FORMALDEHYDE INACTIVATED), INFLUENZA B VIRUS B/PHUKET/3073/2013 BVR-1B ANTIGEN (FORMALDEHYDE INACTIVATED) 15; 15; 15; 15 UG/.5ML; UG/.5ML; UG/.5ML; UG/.5ML
INJECTION, SUSPENSION INTRAMUSCULAR
COMMUNITY
Start: 2023-12-06

## 2024-01-21 RX ORDER — NALOXONE HCL 0.4 MG/ML
0.02 VIAL (ML) INJECTION
Status: DISCONTINUED | OUTPATIENT
Start: 2024-01-21 | End: 2024-01-26 | Stop reason: HOSPADM

## 2024-01-21 RX ORDER — ENOXAPARIN SODIUM 100 MG/ML
40 INJECTION SUBCUTANEOUS EVERY 24 HOURS
Status: DISCONTINUED | OUTPATIENT
Start: 2024-01-21 | End: 2024-01-22

## 2024-01-21 RX ORDER — FUROSEMIDE 20 MG/1
20 TABLET ORAL DAILY
Status: DISCONTINUED | OUTPATIENT
Start: 2024-01-21 | End: 2024-01-26 | Stop reason: HOSPADM

## 2024-01-21 RX ORDER — HYDROCODONE BITARTRATE AND ACETAMINOPHEN 5; 325 MG/1; MG/1
1 TABLET ORAL EVERY 6 HOURS PRN
Status: DISCONTINUED | OUTPATIENT
Start: 2024-01-21 | End: 2024-01-26 | Stop reason: HOSPADM

## 2024-01-21 RX ORDER — SODIUM CHLORIDE 0.9 % (FLUSH) 0.9 %
10 SYRINGE (ML) INJECTION EVERY 12 HOURS PRN
Status: DISCONTINUED | OUTPATIENT
Start: 2024-01-21 | End: 2024-01-26 | Stop reason: HOSPADM

## 2024-01-21 RX ORDER — POLYETHYLENE GLYCOL 3350 17 G/17G
17 POWDER, FOR SOLUTION ORAL DAILY
Status: DISCONTINUED | OUTPATIENT
Start: 2024-01-21 | End: 2024-01-26 | Stop reason: HOSPADM

## 2024-01-21 RX ORDER — GLUCAGON 1 MG
1 KIT INJECTION
Status: DISCONTINUED | OUTPATIENT
Start: 2024-01-21 | End: 2024-01-26 | Stop reason: HOSPADM

## 2024-01-21 RX ORDER — IBUPROFEN 200 MG
16 TABLET ORAL
Status: DISCONTINUED | OUTPATIENT
Start: 2024-01-21 | End: 2024-01-26 | Stop reason: HOSPADM

## 2024-01-21 RX ORDER — ACETAMINOPHEN 325 MG/1
650 TABLET ORAL EVERY 4 HOURS PRN
Status: DISCONTINUED | OUTPATIENT
Start: 2024-01-21 | End: 2024-01-26 | Stop reason: HOSPADM

## 2024-01-21 RX ORDER — ONDANSETRON 8 MG/1
8 TABLET, ORALLY DISINTEGRATING ORAL EVERY 8 HOURS PRN
Status: DISCONTINUED | OUTPATIENT
Start: 2024-01-21 | End: 2024-01-26 | Stop reason: HOSPADM

## 2024-01-21 RX ORDER — NAPROXEN SODIUM 220 MG/1
81 TABLET, FILM COATED ORAL DAILY
Status: DISCONTINUED | OUTPATIENT
Start: 2024-01-21 | End: 2024-01-26 | Stop reason: HOSPADM

## 2024-01-21 RX ADMIN — ENOXAPARIN SODIUM 40 MG: 40 INJECTION SUBCUTANEOUS at 05:01

## 2024-01-21 RX ADMIN — IPRATROPIUM BROMIDE AND ALBUTEROL SULFATE 3 ML: .5; 3 SOLUTION RESPIRATORY (INHALATION) at 03:01

## 2024-01-21 RX ADMIN — POLYETHYLENE GLYCOL 3350 17 G: 17 POWDER, FOR SOLUTION ORAL at 03:01

## 2024-01-21 RX ADMIN — ASPIRIN 81 MG CHEWABLE TABLET 81 MG: 81 TABLET CHEWABLE at 02:01

## 2024-01-21 RX ADMIN — FUROSEMIDE 20 MG: 20 TABLET ORAL at 02:01

## 2024-01-21 NOTE — PHARMACY MED REC
"Admission Medication History     The home medication history was taken by Víctor Miller.    You may go to "Admission" then "Reconcile Home Medications" tabs to review and/or act upon these items.     The home medication list has been updated by the Pharmacy department.   Please read ALL comments highlighted in yellow.   Please address this information as you see fit.    Feel free to contact us if you have any questions or require assistance.        Medications listed below were obtained from: Analytic software- [employer] and Medical records  (Not in a hospital admission)        Víctor Miller  [id number]    Current Outpatient Medications on File Prior to Encounter   Medication Sig Dispense Refill Last Dose    FLUAD QUAD 2023-24,65Y UP,,PF, 60 mcg (15 mcg x 4)/0.5 mL Syrg        albuterol (ACCUNEB) 0.63 mg/3 mL Nebu Take 3 mLs (0.63 mg total) by nebulization every 6 (six) hours as needed (wheezing or shortness of breath). 75 mL 5     meloxicam (MOBIC) 15 MG tablet Take 1 tablet (15 mg total) by mouth daily as needed for Pain. 30 tablet 5                          .          "

## 2024-01-21 NOTE — HPI
HPI: Mr. Hines is a 89 yo [place] male with hx of Afib, CHF with diastolic dysfunction and hypertriglyceridemia presented with shortness of breathe and confusion per ER records.  In the ER, he was found to have elevated troponin x 2, bradycardia, and Covid 19+.  No family was at bedside and [facility] did not have a  available for [employer] therefore Dr. Brooke Carrillo (ER MD) assisted in translating.  Patient stated he was having shortness of breathe, but denied any chest pain.  He has left shoulder pain but could not say how long that has been going on.  He is also blind.  Patient is not requiring NC o2 and chest xray showed no acute finding.  Dr. Molina was consulted for bradycardia and elevated troponin.  He recommended that he be admitted for observations.  Of note, Dr. Cross's note from 11/9/23 did note patient has bradycardia.  He is not on BB or anticoagulation for afib.  Hospital medicine will admit for observation.     CARDS CONSULT OBTAINED IN ER DUE TO BRADYCARDIA PATIENT SEEMS TO HAVE H/O AFIB HAS WENCKEBACH ON OUTPATIENT EKG. IN ER HE HAD EPISODES OF WENCKEBACH 2:1 BLOCK HIGH GRADE BLOCK. NO AFIB NOTED. HE IS COVID POSITIVE .TROPONIN MILDY ELEVATED FLAT CURVE

## 2024-01-21 NOTE — CONSULTS
[facility] - Telemetry ([facility])  Cardiology  Consult Note    Patient Name: Long Albarran  MRN: [id number]  Admission Date: 1/21/2024  Hospital Length of Stay: 0 days  Code Status: Full Code   Attending Provider: Jerry Hill MD   Consulting Provider: Ronel Molina MD    Primary Care Physician: Penny Watson DO  Principal Problem:Bradycardia    Patient information was obtained from past medical records and ER records.     Inpatient consult to Cardiology  Consult performed by: Niyah Molina MD  Consult ordered by: Jerry Hill MD      Inpatient consult to Cardiology  Consult performed by: Niyah Molina MD  Consult ordered by: Brooke Carrillo MD        Subjective:     Chief Complaint:  BRADYCARDIA     HPI:   HPI: Mr. Hines is a 89 yo [place] male with hx of Afib, CHF with diastolic dysfunction and hypertriglyceridemia presented with shortness of breathe and confusion per ER records.  In the ER, he was found to have elevated troponin x 2, bradycardia, and Covid 19+.  No family was at bedside and [facility] did not have a  available for [employer] therefore Dr. Brooke Carrillo (ER MD) assisted in translating.  Patient stated he was having shortness of breathe, but denied any chest pain.  He has left shoulder pain but could not say how long that has been going on.  He is also blind.  Patient is not requiring NC o2 and chest xray showed no acute finding.  Dr. Molina was consulted for bradycardia and elevated troponin.  He recommended that he be admitted for observations.  Of note, Dr. Cross's note from 11/9/23 did note patient has bradycardia.  He is not on BB or anticoagulation for afib.  Hospital medicine will admit for observation.     CARDS CONSULT OBTAINED IN ER DUE TO BRADYCARDIA PATIENT SEEMS TO HAVE H/O AFIB HAS WENCKEBACH ON OUTPATIENT EKG. IN ER HE HAD EPISODES OF WENCKEBACH 2:1 BLOCK HIGH GRADE BLOCK. NO AFIB NOTED. HE IS COVID POSITIVE .TROPONIN MILDY ELEVATED FLAT CURVE    Past Medical History:    Diagnosis Date    A-fib     Diastolic dysfunction 2017    Hypertriglyceridemia        Past Surgical History:   Procedure Laterality Date    ABDOMINAL SURGERY      appendix removal in [place] 3 years ago    CATARACT EXTRACTION, BILATERAL         Review of patient's allergies indicates:  No Known Allergies    No current facility-administered medications on file prior to encounter.     Current Outpatient Medications on File Prior to Encounter   Medication Sig    FLUAD QUAD ,65Y UP,,PF, 60 mcg (15 mcg x 4)/0.5 mL Syrg     albuterol (ACCUNEB) 0.63 mg/3 mL Nebu Take 3 mLs (0.63 mg total) by nebulization every 6 (six) hours as needed (wheezing or shortness of breath).    meloxicam (MOBIC) 15 MG tablet Take 1 tablet (15 mg total) by mouth daily as needed for Pain.     Family History    None       Tobacco Use    Smoking status: Former     Current packs/day: 0.00     Average packs/day: 1 pack/day for 61.0 years (61.0 ttl pk-yrs)     Types: Cigarettes     Start date:      Quit date:      Years since quittin.0     Passive exposure: Past    Smokeless tobacco: Never   Substance and Sexual Activity    Alcohol use: No     Alcohol/week: 0.0 standard drinks of alcohol    Drug use: No    Sexual activity: Not Currently     Review of Systems   Unable to perform ROS: Acuity of condition     Objective:     Vital Signs (Most Recent):  Temp: 97.9 °F (36.6 °C) (24 1510)  Pulse: 72 (24 1622)  Resp: 18 (24 1510)  BP: (!) 140/65 (24 1510)  SpO2: 96 % (24 1510) Vital Signs (24h Range):  Temp:  [97.9 °F (36.6 °C)-98.7 °F (37.1 °C)] 97.9 °F (36.6 °C)  Pulse:  [40-72] 72  Resp:  [12-30] 18  SpO2:  [94 %-98 %] 96 %  BP: (120-179)/(62-86) 140/65     Weight: 64.1 kg (141 lb 5 oz)  Body mass index is 25.03 kg/m².    SpO2: 96 %         Intake/Output Summary (Last 24 hours) at 2024 1649  Last data filed at 2024 0526  Gross per 24 hour   Intake --   Output 150 ml   Net -150 ml        Lines/Drains/Airways       Peripheral Intravenous Line  Duration                  Peripheral IV - Single Lumen 01/21/24 0314 Distal;Posterior;Right Forearm <1 day         Peripheral IV - Single Lumen 01/21/24 0315 22 G Left;Posterior Forearm <1 day                     Physical Exam   NOT PERFORMED  Significant Labs:   Recent Lab Results  (Last 5 results in the past 24 hours)        01/21/24  1421   01/21/24  0626   01/21/24  0520   01/21/24  0306   01/21/24  0300        Influenza A, Molecular         Negative       Influenza B, Molecular         Negative       Procalcitonin   0.14  Comment: A concentration < 0.25 ng/mL represents a low risk of bacterial   infection.  Procalcitonin may not be accurate among patients with localized   infection, recent trauma or major surgery, immunosuppressed state,   invasive fungal infection, renal dysfunction. Decisions regarding   initiation or continuation of antibiotic therapy should not be based   solely on procalcitonin levels.               Albumin       3.4         ALP       71         ALT       39         Anion Gap       7         Appearance, UA     Clear           AST       61         Bacteria, UA     None           Bilirubin (UA)     Negative           BILIRUBIN TOTAL       0.8  Comment: For infants and newborns, interpretation of results should be based  on gestational age, weight and in agreement with clinical  observations.    Premature Infant recommended reference ranges:  Up to 24 hours.............<8.0 mg/dL  Up to 48 hours............<12.0 mg/dL  3-5 days..................<15.0 mg/dL  6-29 days.................<15.0 mg/dL           BNP   354  Comment: Values of less than 100 pg/ml are consistent with non-CHF populations.             BUN       20         Calcium       8.8         Chloride       107         CO2       23         Color, UA     Yellow           Creatinine       0.9         eGFR       >60         Flu A & B Source         Nasal swab       Glucose        105         Glucose, UA     Negative           Hyaline Casts, UA     0           Ketones, UA     Negative           Lactate, Brendon   0.8  Comment: Falsely low lactic acid results can be found in samples   containing >=13.0 mg/dL total bilirubin and/or >=3.5 mg/dL   direct bilirubin.               Leukocytes, UA     Negative           Magnesium        1.8         Microscopic Comment     SEE COMMENT  Comment: Other formed elements not mentioned in the report are not   present in the microscopic examination.              NITRITE UA     Negative           Occult Blood UA     Negative           pH, UA     6.0           Potassium       4.9         PROTEIN TOTAL       7.7         Protein, UA     1+  Comment: Recommend a 24 hour urine protein or a urine   protein/creatinine ratio if globulin induced proteinuria is  clinically suspected.             RBC, UA     3           SARS-CoV-2 RNA, Amplification, Qual         Positive  Comment: This test utilizes isothermal nucleic acid amplification technology   to   detect the SARS-CoV-2 RdRp nucleic acid segment. The analytical   sensitivity   (limit of detection) is 500 copies/swab.    A POSITIVE result is indicative of the presence of SARS-CoV-2 RNA;   clinical   correlation with patient history and other diagnostic information is   necessary to determine patient infection status.    A NEGATIVE result means that SARS-CoV-2 nucleic acids are not present   above   the limit of detection. A NEGATIVE result should be treated as   presumptive.   It does not rule out the possibility of COVID-19 and should not be   the sole   basis for treatment decisions.    If COVID-19 is strongly suspected based on clinical and exposure   history,   re-testing using an alternate molecular assay should be considered.    This test is Food and Drug Administration (FDA) approved. Performance   characteristics of this has been independently verified by [facility] Department of Pathology  and Laboratory Medicine.         Sodium       137         Specific McBee, UA     1.025           Specimen UA     Urine, Clean Catch           Troponin I 0.068  Comment: The reference interval for Troponin I represents the 99th percentile   cutoff   for our facility and is consistent with 3rd generation assay   performance.     0.083  Comment: The reference interval for Troponin I represents the 99th percentile   cutoff   for our facility and is consistent with 3rd generation assay   performance.       0.096  Comment: The reference interval for Troponin I represents the 99th percentile   cutoff   for our facility and is consistent with 3rd generation assay   performance.           TSH       0.894         UROBILINOGEN UA     Negative           WBC, UA     0                                  Significant Imaging: Narrative & Impression  EXAMINATION:  XR CHEST AP PORTABLE     CLINICAL HISTORY:  sob;     TECHNIQUE:  Portable chest x-ray     COMPARISON:  12/06/2023     FINDINGS:  Lung fields are clear.  Heart is normal in size.  The aorta is atherosclerotic.     No pleural fluid or pneumothorax.     No adenopathy is identified.     No significant osseous abnormality.     Impression:     No acute findings.        Electronically signed by: Juancarlos Stovall  Date:                                            01/21/2024  Time:                                           07:36  Assessment and Plan:     * Bradycardia  PATIENT HAS VARIABLE CONDUCTION ABNORMALITY DENOTING SSS DUE TO HIS WENCKEBACH AND VARIATION IN 2:1 BLOCK AS WELL AS HIGH GRADE BLOCK WILL MONITOR IF REMAINS HEMODYNAMICALLY STABLE HE WILL BENEFIT FROM OUTPATIENT MONITOR AND FROM EP EVALUATION.    Chronic diastolic congestive heart failure      Echo    Interpretation Summary  · The left ventricle is normal in size with moderate concentric hypertrophy and normal systolic function.  · The estimated ejection fraction is 55%.  · Indeterminate left ventricular diastolic  function.  · Normal right ventricular size with normal right ventricular systolic function.  · Severe left atrial enlargement.  · Mild tricuspid regurgitation.  · Normal central venous pressure (3 mmHg).  · The estimated PA systolic pressure is 42 mmHg.  · There is pulmonary hypertension.      HE YHAS MILD ELEVATION OF BNP SECONDARY TO HIS ILLNESS AND BRADYCARDIA WILL MONITOR.    Atrial fibrillation  NONE CLINICALLY WILL MONITOR        VTE Risk Mitigation (From admission, onward)           Ordered     enoxaparin injection 40 mg  Daily         01/21/24 1047     IP VTE HIGH RISK PATIENT  Once         01/21/24 1047     Place sequential compression device  Until discontinued         01/21/24 1047                    Thank you for your consult. I will follow-up with patient. Please contact us if you have any additional questions.    Ronel Molina MD  Cardiology   [facility] - Telemetry ([facility])

## 2024-01-21 NOTE — ASSESSMENT & PLAN NOTE
- Cardiology consulted in in the ER due to bradycardia and elevated tropoinin   - Dr. Molina recommended observation  - tele monitoring

## 2024-01-21 NOTE — ED PROVIDER NOTES
SCRIBE #1 NOTE: I, Nimo Lentz, am scribing for, and in the presence of, Ruth Ivy DO. I have scribed the HPI, ROS, and PE.    SCRIBE #2 NOTE: I, Toni Choi, am scribing for, and in the presence of,  Brooke Carrillo MD. I have scribed the remaining portions of the note not scribed by Scribe #1.      History     Chief Complaint   Patient presents with    Shortness of Breath     Episodes of confusion x 2 days, daughter states he didn't recognize her      Review of patient's allergies indicates:  No Known Allergies      History of Present Illness     HPI    1/21/2024, 2:36 AM  History obtained from the patient's daughter who acted as a       History of Present Illness: Long Albarran is a 89 y.o. male patient with a PMHx of Hypertriglyceridemia and diastolic dysfunction who presents to the Emergency Department for evaluation of SOB which onset 2 weeks ago. Pt's daughter states the SOB has been intermittent. Per pt's daughter, pt had a subjective fever 5 days ago, did not measure, it has resolved. She also states he has been confused for the past 3 days. Symptoms are intermittent and moderate in severity. No mitigating or exacerbating factors reported. Associated sxs include blurry vision, confusion, cough, chills, sweats, dizziness, palpitations, and decreased appetite. Patient denies any N/V/D, CP, HA, and all other sxs at this time. No prior Tx reported. No further complaints or concerns at this time.       Arrival mode: Personal vehicle     PCP: Penny Watson DO        Past Medical History:  Past Medical History:   Diagnosis Date    A-fib     Diastolic dysfunction 12/2017    Hypertriglyceridemia        Past Surgical History:  Past Surgical History:   Procedure Laterality Date    ABDOMINAL SURGERY      appendix removal in [place] 3 years ago    CATARACT EXTRACTION, BILATERAL           Family History:  Family History   Problem Relation Age of Onset    Cancer Neg Hx     Diabetes Neg Hx      Heart disease Neg Hx     Hypertension Neg Hx     Kidney disease Neg Hx     Stroke Neg Hx        Social History:  Social History     Tobacco Use    Smoking status: Former     Current packs/day: 0.00     Average packs/day: 1 pack/day for 61.0 years (61.0 ttl pk-yrs)     Types: Cigarettes     Start date:      Quit date:      Years since quittin.0     Passive exposure: Past    Smokeless tobacco: Never   Substance and Sexual Activity    Alcohol use: No     Alcohol/week: 0.0 standard drinks of alcohol    Drug use: No    Sexual activity: Not Currently        Review of Systems     Review of Systems   Constitutional:  Positive for appetite change (decreased), chills and diaphoresis.   Eyes:  Positive for visual disturbance (blurry vision).   Respiratory:  Positive for cough and shortness of breath.    Cardiovascular:  Positive for palpitations. Negative for chest pain.   Gastrointestinal:  Negative for diarrhea, nausea and vomiting.   Neurological:  Positive for dizziness. Negative for headaches.   Psychiatric/Behavioral:  Positive for confusion.       Physical Exam     Initial Vitals [24 0153]   BP Pulse Resp Temp SpO2   132/67 (!) 53 (!) 30 98.1 °F (36.7 °C) 96 %      MAP       --          Physical Exam  Nursing Notes and Vital Signs Reviewed.  Constitutional: Patient is in no acute distress. Frail appearing.  Head: Atraumatic. Normocephalic.  Eyes: PERRL.  Arcus senilis.  Areas of cloudiness of the cornea.  EOM intact. Conjunctivae are not pale. No scleral icterus.  ENT: Mucous membranes are moist.   Neck: Supple. Full ROM.  Cardiovascular: Bradycardic. Irregular rhythm. No murmurs, rubs, or gallops.  Pulmonary/Chest: Tachypneic. Inspiratory wheezing. Expiratory rhonchi.  Abdominal: Soft and non-distended.  There is no tenderness.  No rebound, guarding, or rigidity.  Musculoskeletal: Moves all extremities. No obvious deformities. No edema. No calf tenderness.  Skin: Warm and dry.  Neurological:  Alert,  awake, and appropriate.  No acute focal neurological deficits are appreciated.       ED Course   Critical Care    Date/Time: 1/21/2024 11:31 AM    Performed by: Brooke Carrillo MD  Authorized by: Brooke Carrillo MD  Direct patient critical care time: 5 minutes  Additional history critical care time: 6 minutes  Ordering / reviewing critical care time: 5 minutes  Documentation critical care time: 7 minutes  Consulting other physicians critical care time: 5 minutes  Consult with family critical care time: 3 minutes  Other critical care time: 5 minutes  Total critical care time (exclusive of procedural time) : 36 minutes  Critical care time was exclusive of separately billable procedures and treating other patients and teaching time.  Critical care was necessary to treat or prevent imminent or life-threatening deterioration of the following conditions: Bradyarrhythmia.  Critical care was time spent personally by me on the following activities: blood draw for specimens, development of treatment plan with patient or surrogate, discussions with consultants, interpretation of cardiac output measurements, evaluation of patient's response to treatment, examination of patient, obtaining history from patient or surrogate, ordering and review of radiographic studies, ordering and review of laboratory studies, ordering and performing treatments and interventions, pulse oximetry, review of old charts and re-evaluation of patient's condition.        ED Vital Signs:  Vitals:    01/21/24 0306 01/21/24 0315 01/21/24 0411 01/21/24 0551   BP:   (!) 179/72 (!) 163/68   Pulse: (!) 40 (!) 51 65 (!) 47   Resp: 18 18 15 14   Temp:   98.5 °F (36.9 °C)    TempSrc:   Oral    SpO2: 96% 98% (!) 94% 96%   Weight:       Height:        01/21/24 0627 01/21/24 0802 01/21/24 0803 01/21/24 0852   BP: 120/86 137/62  (!) 158/68   Pulse: (!) 53 63  (!) 40   Resp: 12 16  16   Temp:   98.4 °F (36.9 °C)    TempSrc:   Oral    SpO2: 95% 95%  95%   Weight:        Height:        01/21/24 0857 01/21/24 0900 01/21/24 1100 01/21/24 1200   BP:  (!) 164/70 136/63 (!) 159/63   Pulse: (!) 48 (!) 55 (!) 53 (!) 59   Resp: 15 18 18 19   Temp:       TempSrc:       SpO2: 95% 97% (!) 94% 96%   Weight:       Height:        01/21/24 1400 01/21/24 1421 01/21/24 1510   BP: (!) 140/62 (!) 140/62 (!) 140/65   Pulse: 65 (!) 55 61   Resp: (!) 22 (!) 23 18   Temp:   97.9 °F (36.6 °C)   TempSrc:   Oral   SpO2: 95% 95% 96%   Weight:      Height:          Abnormal Lab Results:  Labs Reviewed   COMPREHENSIVE METABOLIC PANEL - Abnormal; Notable for the following components:       Result Value    Albumin 3.4 (*)     AST 61 (*)     Anion Gap 7 (*)     All other components within normal limits   TROPONIN I - Abnormal; Notable for the following components:    Troponin I 0.096 (*)     All other components within normal limits   URINALYSIS, REFLEX TO URINE CULTURE - Abnormal; Notable for the following components:    Protein, UA 1+ (*)     All other components within normal limits    Narrative:     Specimen Source->Urine   SARS-COV-2 RNA AMPLIFICATION, QUAL - Abnormal; Notable for the following components:    SARS-CoV-2 RNA, Amplification, Qual Positive (*)     All other components within normal limits   TROPONIN I - Abnormal; Notable for the following components:    Troponin I 0.083 (*)     All other components within normal limits   B-TYPE NATRIURETIC PEPTIDE - Abnormal; Notable for the following components:     (*)     All other components within normal limits   TROPONIN I - Abnormal; Notable for the following components:    Troponin I 0.068 (*)     All other components within normal limits   INFLUENZA A & B BY MOLECULAR   MAGNESIUM   TSH   URINALYSIS MICROSCOPIC    Narrative:     Specimen Source->Urine   PROCALCITONIN   LACTIC ACID, PLASMA        All Lab Results:  Results for orders placed or performed during the hospital encounter of 01/21/24   Influenza A & B by Molecular    Specimen:  Nasopharyngeal Swab   Result Value Ref Range    Influenza A, Molecular Negative Negative    Influenza B, Molecular Negative Negative    Flu A & B Source Nasal swab    Comprehensive metabolic panel   Result Value Ref Range    Sodium 137 136 - 145 mmol/L    Potassium 4.9 3.5 - 5.1 mmol/L    Chloride 107 95 - 110 mmol/L    CO2 23 23 - 29 mmol/L    Glucose 105 70 - 110 mg/dL    BUN 20 10 - 30 mg/dL    Creatinine 0.9 0.5 - 1.4 mg/dL    Calcium 8.8 8.7 - 10.5 mg/dL    Total Protein 7.7 6.0 - 8.4 g/dL    Albumin 3.4 (L) 3.5 - 5.2 g/dL    Total Bilirubin 0.8 0.1 - 1.0 mg/dL    Alkaline Phosphatase 71 55 - 135 U/L    AST 61 (H) 10 - 40 U/L    ALT 39 10 - 44 U/L    eGFR >60 >60 mL/min/1.73 m^2    Anion Gap 7 (L) 8 - 16 mmol/L   Magnesium   Result Value Ref Range    Magnesium 1.8 1.6 - 2.6 mg/dL   Troponin I   Result Value Ref Range    Troponin I 0.096 (H) 0.000 - 0.026 ng/mL   TSH   Result Value Ref Range    TSH 0.894 0.400 - 4.000 uIU/mL   Urinalysis, Reflex to Urine Culture Urine, Clean Catch    Specimen: Urine   Result Value Ref Range    Specimen UA Urine, Clean Catch     Color, UA Yellow Yellow, Straw, Francisca    Appearance, UA Clear Clear    pH, UA 6.0 5.0 - 8.0    Specific Gravity, UA 1.025 1.005 - 1.030    Protein, UA 1+ (A) Negative    Glucose, UA Negative Negative    Ketones, UA Negative Negative    Bilirubin (UA) Negative Negative    Occult Blood UA Negative Negative    Nitrite, UA Negative Negative    Urobilinogen, UA Negative <2.0 EU/dL    Leukocytes, UA Negative Negative   COVID-19 Rapid Screening   Result Value Ref Range    SARS-CoV-2 RNA, Amplification, Qual Positive (A) Negative   Urinalysis Microscopic   Result Value Ref Range    RBC, UA 3 0 - 4 /hpf    WBC, UA 0 0 - 5 /hpf    Bacteria None None-Occ /hpf    Hyaline Casts, UA 0 0-1/lpf /lpf    Microscopic Comment SEE COMMENT    Troponin I   Result Value Ref Range    Troponin I 0.083 (H) 0.000 - 0.026 ng/mL   Procalcitonin   Result Value Ref Range     Procalcitonin 0.14 <0.25 ng/mL   Lactic acid, plasma   Result Value Ref Range    Lactate (Lactic Acid) 0.8 0.5 - 2.2 mmol/L   Brain natriuretic peptide   Result Value Ref Range     (H) 0 - 99 pg/mL   Troponin I   Result Value Ref Range    Troponin I 0.068 (H) 0.000 - 0.026 ng/mL        Imaging Results:  Imaging Results              X-Ray Shoulder 1 View Left (Final result)  Result time 01/21/24 11:11:05      Final result by Juancarlos Stovall MD (01/21/24 11:11:05)                   Impression:      Limited single view.  No dislocation is identified.      Electronically signed by: Juancarlos Stovall  Date:    01/21/2024  Time:    11:11               Narrative:    EXAMINATION:  XR SHOULDER 1 VIEW LEFT    CLINICAL HISTORY:  shoulder pain;    TECHNIQUE:  Single view left shoulder series.    COMPARISON:  None    FINDINGS:  No dislocation noted on this single limited view.  Arthritic change AC joint.                                       X-Ray Chest AP Portable (Final result)  Result time 01/21/24 07:36:11      Final result by Juancarlos Stovall MD (01/21/24 07:36:11)                   Impression:      No acute findings.      Electronically signed by: Juancarlos Stovall  Date:    01/21/2024  Time:    07:36               Narrative:    EXAMINATION:  XR CHEST AP PORTABLE    CLINICAL HISTORY:  sob;    TECHNIQUE:  Portable chest x-ray    COMPARISON:  12/06/2023    FINDINGS:  Lung fields are clear.  Heart is normal in size.  The aorta is atherosclerotic.    No pleural fluid or pneumothorax.    No adenopathy is identified.    No significant osseous abnormality.                                       CT Head Without Contrast (Final result)  Result time 01/21/24 07:36:42      Final result by Juancarlos Stovall MD (01/21/24 07:36:42)                   Impression:      No acute findings.    All CT scans at this facility are performed  using dose modulation techniques as appropriate to performed exam including the following:  automated exposure control;  "adjustment of mA and/or kV according to the patients size (this includes techniques or standardized protocols for targeted exams where dose is matched to indication/reason for exam: i.e. extremities or head);  iterative reconstruction technique.      Electronically signed by: Juancarlos Stovall  Date:    01/21/2024  Time:    07:36               Narrative:    EXAMINATION:  CT HEAD WITHOUT CONTRAST    CLINICAL HISTORY:  Mental status change, unknown cause;    TECHNIQUE:  Noncontrast CT scan of the head    FINDINGS:  No intracranial hemorrhage or acute findings are demonstrated.  Small vessel ischemic change in the periventricular white matter.  Intracranial atherosclerosis.  The visualized paranasal sinuses are clear. The calvarium is intact.                                  CT Head without Intravenous Contrast  Radiologist: Greg Ernst MD  [facility] Impression: No evidence of acute intracranial abnormality.    The EKG was ordered, reviewed, and independently interpreted by the ED provider.  Interpretation time: 1:57  Rate: 56 BPM  Rhythm: Undetermined rhythm  Interpretation: Voltage criteria for left ventricular hypertrophy. ST and T wave abnormality, consider inferior ischemia. ST and T wave abnormality, consider anterolateral ischemia. Prolonged QT. Questionable complete heart block. No STEMI.    The EKG was ordered, reviewed, and independently interpreted by the ED provider.  Interpretation time: 2:15  Rate: 54 BPM  Rhythm: Undetermined rhythm  Interpretation: Moderate voltage criteria for LVH, may be normal variant. ST and T wave abnormality, consider inferior ischemia. ST and T wave abnormality, consider anterolateral ischemia. Prolonged QT. Questionable complete heart block. Abnormal ECG. No STEMI.           The Emergency Provider reviewed the vital signs and test results, which are outlined above.     ED Discussion     5:55 AM: Discussed pt's case with Dr. Molina (Interventional Cardiology) who stated "That looks " "like a wenckebach. Can u get me a long rythm strip please? He has a wenckebach on previous EKG. Is he having any symptoms.?"    6:24 AM: Discussed pt's case with Cristofer Catalan NP ([facility]) who stated "added on delta troponin and procalcitonin. Hemodynamically stable. Chest x-ray negative for infectious process. Will have day team follow-up."     6:15 AM: Discussed pt's case with Dr. Molina (Interventional Cardiology) who stated "he looks like in junctional rhythm now with high grade block. please order bnp lactic acid."    6:25 AM: Dr. Ivy transfers care of patient to Dr. Carrillo.    9:12 AM: Discussed case with Dr. Hill ([facility]). Dr. Hill agrees with current care and management of pt and accepts admission.   Admitting Service: [facility]  Admitting Physician: Dr. Hill  Admit to: obs/tele    9:15 AM: Re-evaluated pt. I have discussed test results, shared treatment plan, and the need for admission with patient and family at bedside. Pt and family express understanding at this time and agree with all information. All questions answered. Pt and family have no further questions or concerns at this time. Pt is ready for admit.              ED Course as of 01/21/24 1515   Sun Jan 21, 2024   0417 SARS-CoV-2 RNA, Amplification, Qual(!): Positive [NF]   0417 Troponin I(!): 0.096 [NF]   0556 89-year-old male presents with shortness of breath.  COVID-19 positive.  Bump in troponin.  Chest x-ray negative for pneumonia.  EKG shows first-degree AV block. [NF]      ED Course User Index  [NF] Ruth Ivy, DO     Medical Decision Making  Amount and/or Complexity of Data Reviewed  Labs: ordered. Decision-making details documented in ED Course.  Radiology: ordered. Decision-making details documented in ED Course.  ECG/medicine tests: ordered and independent interpretation performed. Decision-making details documented in ED Course.    Risk  Prescription drug management.                ED " Medication(s):  Medications   aspirin chewable tablet 81 mg (81 mg Oral Given 1/21/24 1421)   sodium chloride 0.9% flush 10 mL (has no administration in time range)   naloxone 0.4 mg/mL injection 0.02 mg (has no administration in time range)   glucose chewable tablet 16 g (has no administration in time range)   glucose chewable tablet 24 g (has no administration in time range)   glucagon (human recombinant) injection 1 mg (has no administration in time range)   enoxaparin injection 40 mg (has no administration in time range)   acetaminophen tablet 650 mg (has no administration in time range)   HYDROcodone-acetaminophen 5-325 mg per tablet 1 tablet (has no administration in time range)   polyethylene glycol packet 17 g (has no administration in time range)   ondansetron disintegrating tablet 8 mg (has no administration in time range)   prochlorperazine injection Soln 5 mg (has no administration in time range)   dextrose 10% bolus 125 mL 125 mL (has no administration in time range)   dextrose 10% bolus 250 mL 250 mL (has no administration in time range)   furosemide tablet 20 mg (20 mg Oral Given 1/21/24 1421)   albuterol-ipratropium 2.5 mg-0.5 mg/3 mL nebulizer solution 3 mL (3 mLs Nebulization Given 1/21/24 0315)       New Prescriptions    No medications on file               Scribe Attestation:   Scribe #1: I performed the above scribed service and the documentation accurately describes the services I performed. I attest to the accuracy of the note.     Attending:   Physician Attestation Statement for Scribe #1: I, Ruth Ivy DO, personally performed the services described in this documentation, as scribed by Nimo Lentz, in my presence, and it is both accurate and complete.       Scribe Attestation:   Scribe #2: I performed the above scribed service and the documentation accurately describes the services I performed. I attest to the accuracy of the note.    Attending Attestation:           Physician  Attestation for Scribe:    Physician Attestation Statement for Scribe #2: I, Brooke Carrillo MD, reviewed documentation, as scribed by Toni Choi in my presence, and it is both accurate and complete. I also acknowledge and confirm the content of the note done by Scribe #1.           Clinical Impression       ICD-10-CM ICD-9-CM   1. COVID-19  U07.1 079.89   2. SOB (shortness of breath)  R06.02 786.05   3. 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 NSTEMI (non-ST elevated myocardial infarction)  I21.4 410.70   5. Bradyarrhythmia  I49.8 427.89   6. Chest pain  R07.9 786.50       Disposition:   Disposition: Placed in Observation  Condition: Brooke Velasquez MD  01/21/24 6087

## 2024-01-21 NOTE — ASSESSMENT & PLAN NOTE
PATIENT HAS VARIABLE CONDUCTION ABNORMALITY DENOTING SSS DUE TO HIS WENCKEBACH AND VARIATION IN 2:1 BLOCK AS WELL AS HIGH GRADE BLOCK WILL MONITOR IF REMAINS HEMODYNAMICALLY STABLE HE WILL BENEFIT FROM OUTPATIENT MONITOR AND FROM EP EVALUATION.

## 2024-01-21 NOTE — HPI
Mr. Hines is a 89 yo [place] male with hx of Afib, CHF with diastolic dysfunction and hypertriglyceridemia presented with shortness of breathe and confusion per ER records.  In the ER, he was found to have elevated troponin x 2, bradycardia, and Covid 19+.  No family was at bedside and [facility] did not have a  available for [employer] therefore Dr. Brooke Carrillo (ER MD) assisted in translating.  Patient stated he was having shortness of breathe, but denied any chest pain.  He has left shoulder pain but could not say how long that has been going on.  He is also blind.  Patient is not requiring NC o2 and chest xray showed no acute finding.  Dr. Molina was consulted for bradycardia and elevated troponin.  He recommended that he be admitted for observations.  Of note, Dr. Cross's note from 11/9/23 did note patient has bradycardia.  He is not on BB or anticoagulation for afib.  Hospital medicine will admit for observation.

## 2024-01-21 NOTE — SUBJECTIVE & OBJECTIVE
Past Medical History:   Diagnosis Date    A-fib     Diastolic dysfunction 2017    Hypertriglyceridemia        Past Surgical History:   Procedure Laterality Date    ABDOMINAL SURGERY      appendix removal in [place] 3 years ago    CATARACT EXTRACTION, BILATERAL         Review of patient's allergies indicates:  No Known Allergies    Current Facility-Administered Medications on File Prior to Encounter   Medication    aspirin chewable tablet 81 mg     Current Outpatient Medications on File Prior to Encounter   Medication Sig    FLUAD QUAD ,65Y UP,,PF, 60 mcg (15 mcg x 4)/0.5 mL Syrg     albuterol (ACCUNEB) 0.63 mg/3 mL Nebu Take 3 mLs (0.63 mg total) by nebulization every 6 (six) hours as needed (wheezing or shortness of breath).    meloxicam (MOBIC) 15 MG tablet Take 1 tablet (15 mg total) by mouth daily as needed for Pain.     Family History    None       Tobacco Use    Smoking status: Former     Current packs/day: 0.00     Average packs/day: 1 pack/day for 61.0 years (61.0 ttl pk-yrs)     Types: Cigarettes     Start date:      Quit date:      Years since quittin.0     Passive exposure: Past    Smokeless tobacco: Never   Substance and Sexual Activity    Alcohol use: No     Alcohol/week: 0.0 standard drinks of alcohol    Drug use: No    Sexual activity: Not Currently     Review of Systems   Constitutional:  Negative for activity change, appetite change, chills, fatigue and fever.   Respiratory:  Positive for shortness of breath. Negative for apnea, cough and stridor.    Cardiovascular:  Negative for chest pain, palpitations and leg swelling.   Gastrointestinal:  Negative for abdominal distention, constipation, diarrhea, nausea and vomiting.   Genitourinary:  Negative for difficulty urinating, dysuria, frequency, hematuria and urgency.   Musculoskeletal:  Positive for joint swelling. Negative for arthralgias, back pain and gait problem.   Neurological:  Negative for dizziness, seizures, weakness,  light-headedness, numbness and headaches.   Psychiatric/Behavioral:  Negative for agitation, behavioral problems, confusion, decreased concentration, dysphoric mood and hallucinations.    All other systems reviewed and are negative.    Objective:     Vital Signs (Most Recent):  Temp: 98.4 °F (36.9 °C) (01/21/24 0803)  Pulse: (!) 55 (01/21/24 0900)  Resp: 18 (01/21/24 0900)  BP: (!) 164/70 (01/21/24 0900)  SpO2: 97 % (01/21/24 0900) Vital Signs (24h Range):  Temp:  [98.1 °F (36.7 °C)-98.7 °F (37.1 °C)] 98.4 °F (36.9 °C)  Pulse:  [40-65] 55  Resp:  [12-30] 18  SpO2:  [94 %-98 %] 97 %  BP: (120-179)/(62-86) 164/70     Weight: 64.1 kg (141 lb 5 oz)  Body mass index is 25.03 kg/m².     Physical Exam  Vitals and nursing note reviewed.   Constitutional:       Appearance: Normal appearance.   HENT:      Head: Normocephalic and atraumatic.      Nose: Nose normal.      Mouth/Throat:      Mouth: Mucous membranes are moist.   Eyes:      Extraocular Movements: Extraocular movements intact.      Conjunctiva/sclera: Conjunctivae normal.   Cardiovascular:      Rate and Rhythm: Bradycardia present. Rhythm irregular.      Pulses: Normal pulses.      Heart sounds: Normal heart sounds.   Pulmonary:      Effort: Pulmonary effort is normal.      Breath sounds: Normal breath sounds.   Abdominal:      General: Abdomen is flat. Bowel sounds are normal.      Palpations: Abdomen is soft.   Musculoskeletal:         General: Tenderness present. Normal range of motion.      Cervical back: Normal range of motion and neck supple.      Comments: Left shoulder tenderness to movement.   Skin:     General: Skin is warm.      Capillary Refill: Capillary refill takes less than 2 seconds.   Neurological:      Mental Status: He is alert and oriented to person, place, and time. Mental status is at baseline.   Psychiatric:         Mood and Affect: Mood normal.         Behavior: Behavior normal.                Significant Labs: All pertinent labs within the  past 24 hours have been reviewed.  Recent Lab Results         01/21/24  0626   01/21/24  0520   01/21/24  0306   01/21/24  0300        Influenza A, Molecular       Negative       Influenza B, Molecular       Negative       Procalcitonin 0.14  Comment: A concentration < 0.25 ng/mL represents a low risk of bacterial   infection.  Procalcitonin may not be accurate among patients with localized   infection, recent trauma or major surgery, immunosuppressed state,   invasive fungal infection, renal dysfunction. Decisions regarding   initiation or continuation of antibiotic therapy should not be based   solely on procalcitonin levels.               Albumin     3.4         ALP     71         ALT     39         Anion Gap     7         Appearance, UA   Clear           AST     61         Bacteria, UA   None           Bilirubin (UA)   Negative           BILIRUBIN TOTAL     0.8  Comment: For infants and newborns, interpretation of results should be based  on gestational age, weight and in agreement with clinical  observations.    Premature Infant recommended reference ranges:  Up to 24 hours.............<8.0 mg/dL  Up to 48 hours............<12.0 mg/dL  3-5 days..................<15.0 mg/dL  6-29 days.................<15.0 mg/dL             Comment: Values of less than 100 pg/ml are consistent with non-CHF populations.             BUN     20         Calcium     8.8         Chloride     107         CO2     23         Color, UA   Yellow           Creatinine     0.9         eGFR     >60         Flu A & B Source       Nasal swab       Glucose     105         Glucose, UA   Negative           Hyaline Casts, UA   0           Ketones, UA   Negative           Lactate, Brendon 0.8  Comment: Falsely low lactic acid results can be found in samples   containing >=13.0 mg/dL total bilirubin and/or >=3.5 mg/dL   direct bilirubin.               Leukocytes, UA   Negative           Magnesium      1.8         Microscopic Comment   SEE  COMMENT  Comment: Other formed elements not mentioned in the report are not   present in the microscopic examination.              NITRITE UA   Negative           Occult Blood UA   Negative           pH, UA   6.0           Potassium     4.9         PROTEIN TOTAL     7.7         Protein, UA   1+  Comment: Recommend a 24 hour urine protein or a urine   protein/creatinine ratio if globulin induced proteinuria is  clinically suspected.             RBC, UA   3           SARS-CoV-2 RNA, Amplification, Qual       Positive  Comment: This test utilizes isothermal nucleic acid amplification technology   to   detect the SARS-CoV-2 RdRp nucleic acid segment. The analytical   sensitivity   (limit of detection) is 500 copies/swab.    A POSITIVE result is indicative of the presence of SARS-CoV-2 RNA;   clinical   correlation with patient history and other diagnostic information is   necessary to determine patient infection status.    A NEGATIVE result means that SARS-CoV-2 nucleic acids are not present   above   the limit of detection. A NEGATIVE result should be treated as   presumptive.   It does not rule out the possibility of COVID-19 and should not be   the sole   basis for treatment decisions.    If COVID-19 is strongly suspected based on clinical and exposure   history,   re-testing using an alternate molecular assay should be considered.    This test is Food and Drug Administration (FDA) approved. Performance   characteristics of this has been independently verified by [facility] Department of Pathology and Laboratory Medicine.         Sodium     137         Specific [place], UA   1.025           Specimen UA   Urine, Clean Catch           Troponin I 0.083  Comment: The reference interval for Troponin I represents the 99th percentile   cutoff   for our facility and is consistent with 3rd generation assay   performance.       0.096  Comment: The reference interval for Troponin I represents the 99th percentile    cutoff   for our facility and is consistent with 3rd generation assay   performance.           TSH     0.894         UROBILINOGEN UA   Negative           WBC, UA   0                   Significant Imaging:   X-Ray Chest AP Portable   Final Result      No acute findings.         Electronically signed by: Juancarlos Stovall   Date:    01/21/2024   Time:    07:36      CT Head Without Contrast   Final Result      No acute findings.      All CT scans at this facility are performed  using dose modulation techniques as appropriate to performed exam including the following:  automated exposure control; adjustment of mA and/or kV according to the patients size (this includes techniques or standardized protocols for targeted exams where dose is matched to indication/reason for exam: i.e. extremities or head);  iterative reconstruction technique.         Electronically signed by: Juancarlos Stovall   Date:    01/21/2024   Time:    07:36      X-Ray Shoulder 1 View Left    (Results Pending)

## 2024-01-21 NOTE — H&P
[facility] - Emergency Dept.  Hospital Medicine  History & Physical    Patient Name: Long Albarran  MRN: [id number]  Patient Class: OP- Observation  Admission Date: 1/21/2024  Attending Physician: Jerry Hill MD   Primary Care Provider: Penny Watson DO         Patient information was obtained from patient, past medical records, and ER records.     Subjective:     Principal Problem:Bradycardia    Chief Complaint:   Chief Complaint   Patient presents with    Shortness of Breath     Episodes of confusion x 2 days, daughter states he didn't recognize her         HPI: Mr. Hines is a 89 yo [place] male with hx of Afib, CHF with diastolic dysfunction and hypertriglyceridemia presented with shortness of breathe and confusion per ER records.  In the ER, he was found to have elevated troponin x 2, bradycardia, and Covid 19+.  No family was at bedside and [facility] did not have a  available for [employer] therefore Dr. Brooke Carrillo (ER MD) assisted in translating.  Patient stated he was having shortness of breathe, but denied any chest pain.  He has left shoulder pain but could not say how long that has been going on.  He is also blind.  Patient is not requiring NC o2 and chest xray showed no acute finding.  Dr. Molina was consulted for bradycardia and elevated troponin.  He recommended that he be admitted for observations.  Of note, Dr. Cross's note from 11/9/23 did note patient has bradycardia.  He is not on BB or anticoagulation for afib.  Hospital medicine will admit for observation.    Past Medical History:   Diagnosis Date    A-fib     Diastolic dysfunction 12/2017    Hypertriglyceridemia        Past Surgical History:   Procedure Laterality Date    ABDOMINAL SURGERY      appendix removal in [place] 3 years ago    CATARACT EXTRACTION, BILATERAL         Review of patient's allergies indicates:  No Known Allergies    Current Facility-Administered Medications on File Prior to Encounter   Medication    aspirin chewable  tablet 81 mg     Current Outpatient Medications on File Prior to Encounter   Medication Sig    FLUAD QUAD ,65Y UP,,PF, 60 mcg (15 mcg x 4)/0.5 mL Syrg     albuterol (ACCUNEB) 0.63 mg/3 mL Nebu Take 3 mLs (0.63 mg total) by nebulization every 6 (six) hours as needed (wheezing or shortness of breath).    meloxicam (MOBIC) 15 MG tablet Take 1 tablet (15 mg total) by mouth daily as needed for Pain.     Family History    None       Tobacco Use    Smoking status: Former     Current packs/day: 0.00     Average packs/day: 1 pack/day for 61.0 years (61.0 ttl pk-yrs)     Types: Cigarettes     Start date:      Quit date:      Years since quittin.0     Passive exposure: Past    Smokeless tobacco: Never   Substance and Sexual Activity    Alcohol use: No     Alcohol/week: 0.0 standard drinks of alcohol    Drug use: No    Sexual activity: Not Currently     Review of Systems   Constitutional:  Negative for activity change, appetite change, chills, fatigue and fever.   Respiratory:  Positive for shortness of breath. Negative for apnea, cough and stridor.    Cardiovascular:  Negative for chest pain, palpitations and leg swelling.   Gastrointestinal:  Negative for abdominal distention, constipation, diarrhea, nausea and vomiting.   Genitourinary:  Negative for difficulty urinating, dysuria, frequency, hematuria and urgency.   Musculoskeletal:  Positive for joint swelling. Negative for arthralgias, back pain and gait problem.   Neurological:  Negative for dizziness, seizures, weakness, light-headedness, numbness and headaches.   Psychiatric/Behavioral:  Negative for agitation, behavioral problems, confusion, decreased concentration, dysphoric mood and hallucinations.    All other systems reviewed and are negative.    Objective:     Vital Signs (Most Recent):  Temp: 98.4 °F (36.9 °C) (24 08)  Pulse: (!) 55 (24)  Resp: 18 (24)  BP: (!) 164/70 (24)  SpO2: 97 % (24)  Vital Signs (24h Range):  Temp:  [98.1 °F (36.7 °C)-98.7 °F (37.1 °C)] 98.4 °F (36.9 °C)  Pulse:  [40-65] 55  Resp:  [12-30] 18  SpO2:  [94 %-98 %] 97 %  BP: (120-179)/(62-86) 164/70     Weight: 64.1 kg (141 lb 5 oz)  Body mass index is 25.03 kg/m².     Physical Exam  Vitals and nursing note reviewed.   Constitutional:       Appearance: Normal appearance.   HENT:      Head: Normocephalic and atraumatic.      Nose: Nose normal.      Mouth/Throat:      Mouth: Mucous membranes are moist.   Eyes:      Extraocular Movements: Extraocular movements intact.      Conjunctiva/sclera: Conjunctivae normal.   Cardiovascular:      Rate and Rhythm: Bradycardia present. Rhythm irregular.      Pulses: Normal pulses.      Heart sounds: Normal heart sounds.   Pulmonary:      Effort: Pulmonary effort is normal.      Breath sounds: Normal breath sounds.   Abdominal:      General: Abdomen is flat. Bowel sounds are normal.      Palpations: Abdomen is soft.   Musculoskeletal:         General: Tenderness present. Normal range of motion.      Cervical back: Normal range of motion and neck supple.      Comments: Left shoulder tenderness to movement.   Skin:     General: Skin is warm.      Capillary Refill: Capillary refill takes less than 2 seconds.   Neurological:      Mental Status: He is alert and oriented to person, place, and time. Mental status is at baseline.   Psychiatric:         Mood and Affect: Mood normal.         Behavior: Behavior normal.                Significant Labs: All pertinent labs within the past 24 hours have been reviewed.  Recent Lab Results         01/21/24  0626   01/21/24  0520   01/21/24  0306   01/21/24  0300        Influenza A, Molecular       Negative       Influenza B, Molecular       Negative       Procalcitonin 0.14  Comment: A concentration < 0.25 ng/mL represents a low risk of bacterial   infection.  Procalcitonin may not be accurate among patients with localized   infection, recent trauma or major  surgery, immunosuppressed state,   invasive fungal infection, renal dysfunction. Decisions regarding   initiation or continuation of antibiotic therapy should not be based   solely on procalcitonin levels.               Albumin     3.4         ALP     71         ALT     39         Anion Gap     7         Appearance, UA   Clear           AST     61         Bacteria, UA   None           Bilirubin (UA)   Negative           BILIRUBIN TOTAL     0.8  Comment: For infants and newborns, interpretation of results should be based  on gestational age, weight and in agreement with clinical  observations.    Premature Infant recommended reference ranges:  Up to 24 hours.............<8.0 mg/dL  Up to 48 hours............<12.0 mg/dL  3-5 days..................<15.0 mg/dL  6-29 days.................<15.0 mg/dL             Comment: Values of less than 100 pg/ml are consistent with non-CHF populations.             BUN     20         Calcium     8.8         Chloride     107         CO2     23         Color, UA   Yellow           Creatinine     0.9         eGFR     >60         Flu A & B Source       Nasal swab       Glucose     105         Glucose, UA   Negative           Hyaline Casts, UA   0           Ketones, UA   Negative           Lactate, Brendon 0.8  Comment: Falsely low lactic acid results can be found in samples   containing >=13.0 mg/dL total bilirubin and/or >=3.5 mg/dL   direct bilirubin.               Leukocytes, UA   Negative           Magnesium      1.8         Microscopic Comment   SEE COMMENT  Comment: Other formed elements not mentioned in the report are not   present in the microscopic examination.              NITRITE UA   Negative           Occult Blood UA   Negative           pH, UA   6.0           Potassium     4.9         PROTEIN TOTAL     7.7         Protein, UA   1+  Comment: Recommend a 24 hour urine protein or a urine   protein/creatinine ratio if globulin induced proteinuria is  clinically suspected.              RBC, UA   3           SARS-CoV-2 RNA, Amplification, Qual       Positive  Comment: This test utilizes isothermal nucleic acid amplification technology   to   detect the SARS-CoV-2 RdRp nucleic acid segment. The analytical   sensitivity   (limit of detection) is 500 copies/swab.    A POSITIVE result is indicative of the presence of SARS-CoV-2 RNA;   clinical   correlation with patient history and other diagnostic information is   necessary to determine patient infection status.    A NEGATIVE result means that SARS-CoV-2 nucleic acids are not present   above   the limit of detection. A NEGATIVE result should be treated as   presumptive.   It does not rule out the possibility of COVID-19 and should not be   the sole   basis for treatment decisions.    If COVID-19 is strongly suspected based on clinical and exposure   history,   re-testing using an alternate molecular assay should be considered.    This test is Food and Drug Administration (FDA) approved. Performance   characteristics of this has been independently verified by [facility] Department of Pathology and Laboratory Medicine.         Sodium     137         Specific [place], UA   1.025           Specimen UA   Urine, Clean Catch           Troponin I 0.083  Comment: The reference interval for Troponin I represents the 99th percentile   cutoff   for our facility and is consistent with 3rd generation assay   performance.       0.096  Comment: The reference interval for Troponin I represents the 99th percentile   cutoff   for our facility and is consistent with 3rd generation assay   performance.           TSH     0.894         UROBILINOGEN UA   Negative           WBC, UA   0                   Significant Imaging:   X-Ray Chest AP Portable   Final Result      No acute findings.         Electronically signed by: Juancarlos Stovall   Date:    01/21/2024   Time:    07:36      CT Head Without Contrast   Final Result      No acute findings.      All CT  scans at this facility are performed  using dose modulation techniques as appropriate to performed exam including the following:  automated exposure control; adjustment of mA and/or kV according to the patients size (this includes techniques or standardized protocols for targeted exams where dose is matched to indication/reason for exam: i.e. extremities or head);  iterative reconstruction technique.         Electronically signed by: Juancarlos Stovall   Date:    01/21/2024   Time:    07:36      X-Ray Shoulder 1 View Left    (Results Pending)      Assessment/Plan:     * Bradycardia  - Cardiology consulted in in the ER due to bradycardia and elevated tropoinin   - Dr. Molina recommended observation  - tele monitoring      COVID-19  - supportive management  - CXR negative  - On RA sating in the high 90s  - monitor for fevers    Chronic diastolic congestive heart failure  Patient is identified as having Diastolic (HFpEF) heart failure that is Chronic. CHF is currently controlled. Latest ECHO performed and demonstrates- Results for orders placed during the hospital encounter of 02/17/22    Echo    Interpretation Summary  · The left ventricle is normal in size with moderate concentric hypertrophy and normal systolic function.  · The estimated ejection fraction is 55%.  · Indeterminate left ventricular diastolic function.  · Normal right ventricular size with normal right ventricular systolic function.  · Severe left atrial enlargement.  · Mild tricuspid regurgitation.  · Normal central venous pressure (3 mmHg).  · The estimated PA systolic pressure is 42 mmHg.  · There is pulmonary hypertension.  . Continue Furosemide and monitor clinical status closely. Monitor on telemetry. Patient is off CHF pathway.  Monitor strict Is&Os and daily weights.  Place on fluid restriction of 1.5 L. Cardiology has been consulted. Continue to stress to patient importance of self efficacy and  on diet for CHF. Last BNP reviewed- and noted  below   Recent Labs   Lab 01/21/24  0626   *     - continue lasix 20mg PO daily (home meds)    Atrial fibrillation  Patient with Paroxysmal (<7 days) atrial fibrillation which is controlled currently with  no meds 2/2 bradycardia . Patient is currently in atrial fibrillation.[id number] Score: 2. Anticoagulation not indicated due to age and risk of falls .      VTE Risk Mitigation (From admission, onward)           Ordered     enoxaparin injection 40 mg  Daily         01/21/24 1047     IP VTE HIGH RISK PATIENT  Once         01/21/24 1047     Place sequential compression device  Until discontinued         01/21/24 1047                       On 01/21/2024, patient should be placed in hospital observation services under my care.             Jerry Hill MD  Department of Hospital Medicine  [facility] - Emergency Dept.

## 2024-01-21 NOTE — SUBJECTIVE & OBJECTIVE
Past Medical History:   Diagnosis Date    A-fib     Diastolic dysfunction 2017    Hypertriglyceridemia        Past Surgical History:   Procedure Laterality Date    ABDOMINAL SURGERY      appendix removal in [place] 3 years ago    CATARACT EXTRACTION, BILATERAL         Review of patient's allergies indicates:  No Known Allergies    No current facility-administered medications on file prior to encounter.     Current Outpatient Medications on File Prior to Encounter   Medication Sig    FLUAD QUAD ,65Y UP,,PF, 60 mcg (15 mcg x 4)/0.5 mL Syrg     albuterol (ACCUNEB) 0.63 mg/3 mL Nebu Take 3 mLs (0.63 mg total) by nebulization every 6 (six) hours as needed (wheezing or shortness of breath).    meloxicam (MOBIC) 15 MG tablet Take 1 tablet (15 mg total) by mouth daily as needed for Pain.     Family History    None       Tobacco Use    Smoking status: Former     Current packs/day: 0.00     Average packs/day: 1 pack/day for 61.0 years (61.0 ttl pk-yrs)     Types: Cigarettes     Start date:      Quit date:      Years since quittin.0     Passive exposure: Past    Smokeless tobacco: Never   Substance and Sexual Activity    Alcohol use: No     Alcohol/week: 0.0 standard drinks of alcohol    Drug use: No    Sexual activity: Not Currently     Review of Systems   Unable to perform ROS: Acuity of condition     Objective:     Vital Signs (Most Recent):  Temp: 97.9 °F (36.6 °C) (24 1510)  Pulse: 72 (24 1622)  Resp: 18 (24 1510)  BP: (!) 140/65 (24 1510)  SpO2: 96 % (24 1510) Vital Signs (24h Range):  Temp:  [97.9 °F (36.6 °C)-98.7 °F (37.1 °C)] 97.9 °F (36.6 °C)  Pulse:  [40-72] 72  Resp:  [12-30] 18  SpO2:  [94 %-98 %] 96 %  BP: (120-179)/(62-86) 140/65     Weight: 64.1 kg (141 lb 5 oz)  Body mass index is 25.03 kg/m².    SpO2: 96 %         Intake/Output Summary (Last 24 hours) at 2024 1649  Last data filed at 2024 0526  Gross per 24 hour   Intake --   Output 150 ml   Net  -150 ml       Lines/Drains/Airways       Peripheral Intravenous Line  Duration                  Peripheral IV - Single Lumen 01/21/24 0314 Distal;Posterior;Right Forearm <1 day         Peripheral IV - Single Lumen 01/21/24 0315 22 G Left;Posterior Forearm <1 day                     Physical Exam   NOT PERFORMED  Significant Labs:   Recent Lab Results  (Last 5 results in the past 24 hours)        01/21/24  1421   01/21/24  0626   01/21/24  0520   01/21/24  0306   01/21/24  0300        Influenza A, Molecular         Negative       Influenza B, Molecular         Negative       Procalcitonin   0.14  Comment: A concentration < 0.25 ng/mL represents a low risk of bacterial   infection.  Procalcitonin may not be accurate among patients with localized   infection, recent trauma or major surgery, immunosuppressed state,   invasive fungal infection, renal dysfunction. Decisions regarding   initiation or continuation of antibiotic therapy should not be based   solely on procalcitonin levels.               Albumin       3.4         ALP       71         ALT       39         Anion Gap       7         Appearance, UA     Clear           AST       61         Bacteria, UA     None           Bilirubin (UA)     Negative           BILIRUBIN TOTAL       0.8  Comment: For infants and newborns, interpretation of results should be based  on gestational age, weight and in agreement with clinical  observations.    Premature Infant recommended reference ranges:  Up to 24 hours.............<8.0 mg/dL  Up to 48 hours............<12.0 mg/dL  3-5 days..................<15.0 mg/dL  6-29 days.................<15.0 mg/dL           BNP   354  Comment: Values of less than 100 pg/ml are consistent with non-CHF populations.             BUN       20         Calcium       8.8         Chloride       107         CO2       23         Color, UA     Yellow           Creatinine       0.9         eGFR       >60         Flu A & B Source         Nasal swab        Glucose       105         Glucose, UA     Negative           Hyaline Casts, UA     0           Ketones, UA     Negative           Lactate, Brendon   0.8  Comment: Falsely low lactic acid results can be found in samples   containing >=13.0 mg/dL total bilirubin and/or >=3.5 mg/dL   direct bilirubin.               Leukocytes, UA     Negative           Magnesium        1.8         Microscopic Comment     SEE COMMENT  Comment: Other formed elements not mentioned in the report are not   present in the microscopic examination.              NITRITE UA     Negative           Occult Blood UA     Negative           pH, UA     6.0           Potassium       4.9         PROTEIN TOTAL       7.7         Protein, UA     1+  Comment: Recommend a 24 hour urine protein or a urine   protein/creatinine ratio if globulin induced proteinuria is  clinically suspected.             RBC, UA     3           SARS-CoV-2 RNA, Amplification, Qual         Positive  Comment: This test utilizes isothermal nucleic acid amplification technology   to   detect the SARS-CoV-2 RdRp nucleic acid segment. The analytical   sensitivity   (limit of detection) is 500 copies/swab.    A POSITIVE result is indicative of the presence of SARS-CoV-2 RNA;   clinical   correlation with patient history and other diagnostic information is   necessary to determine patient infection status.    A NEGATIVE result means that SARS-CoV-2 nucleic acids are not present   above   the limit of detection. A NEGATIVE result should be treated as   presumptive.   It does not rule out the possibility of COVID-19 and should not be   the sole   basis for treatment decisions.    If COVID-19 is strongly suspected based on clinical and exposure   history,   re-testing using an alternate molecular assay should be considered.    This test is Food and Drug Administration (FDA) approved. Performance   characteristics of this has been independently verified by [facility] Department of  Pathology and Laboratory Medicine.         Sodium       137         Specific [place], UA     1.025           Specimen UA     Urine, Clean Catch           Troponin I 0.068  Comment: The reference interval for Troponin I represents the 99th percentile   cutoff   for our facility and is consistent with 3rd generation assay   performance.     0.083  Comment: The reference interval for Troponin I represents the 99th percentile   cutoff   for our facility and is consistent with 3rd generation assay   performance.       0.096  Comment: The reference interval for Troponin I represents the 99th percentile   cutoff   for our facility and is consistent with 3rd generation assay   performance.           TSH       0.894         UROBILINOGEN UA     Negative           WBC, UA     0                                  Significant Imaging: Narrative & Impression  EXAMINATION:  XR CHEST AP PORTABLE     CLINICAL HISTORY:  sob;     TECHNIQUE:  Portable chest x-ray     COMPARISON:  12/06/2023     FINDINGS:  Lung fields are clear.  Heart is normal in size.  The aorta is atherosclerotic.     No pleural fluid or pneumothorax.     No adenopathy is identified.     No significant osseous abnormality.     Impression:     No acute findings.        Electronically signed by: Juancarlos Stovall  Date:                                            01/21/2024  Time:                                           07:36

## 2024-01-21 NOTE — ASSESSMENT & PLAN NOTE
Patient is identified as having Diastolic (HFpEF) heart failure that is Chronic. CHF is currently controlled. Latest ECHO performed and demonstrates- Results for orders placed during the hospital encounter of 02/17/22    Echo    Interpretation Summary  · The left ventricle is normal in size with moderate concentric hypertrophy and normal systolic function.  · The estimated ejection fraction is 55%.  · Indeterminate left ventricular diastolic function.  · Normal right ventricular size with normal right ventricular systolic function.  · Severe left atrial enlargement.  · Mild tricuspid regurgitation.  · Normal central venous pressure (3 mmHg).  · The estimated PA systolic pressure is 42 mmHg.  · There is pulmonary hypertension.  . Continue Furosemide and monitor clinical status closely. Monitor on telemetry. Patient is off CHF pathway.  Monitor strict Is&Os and daily weights.  Place on fluid restriction of 1.5 L. Cardiology has been consulted. Continue to stress to patient importance of self efficacy and  on diet for CHF. Last BNP reviewed- and noted below   Recent Labs   Lab 01/21/24  0626   *     - continue lasix 20mg PO daily (home meds)

## 2024-01-21 NOTE — ASSESSMENT & PLAN NOTE
Patient with Paroxysmal (<7 days) atrial fibrillation which is controlled currently with  no meds 2/2 bradycardia . Patient is currently in atrial fibrillation.[id number] Score: 2. Anticoagulation not indicated due to age and risk of falls .

## 2024-01-21 NOTE — ASSESSMENT & PLAN NOTE
Echo    Interpretation Summary  · The left ventricle is normal in size with moderate concentric hypertrophy and normal systolic function.  · The estimated ejection fraction is 55%.  · Indeterminate left ventricular diastolic function.  · Normal right ventricular size with normal right ventricular systolic function.  · Severe left atrial enlargement.  · Mild tricuspid regurgitation.  · Normal central venous pressure (3 mmHg).  · The estimated PA systolic pressure is 42 mmHg.  · There is pulmonary hypertension.      HE YHAS MILD ELEVATION OF BNP SECONDARY TO HIS ILLNESS AND BRADYCARDIA WILL MONITOR.

## 2024-01-22 LAB
ALBUMIN SERPL BCP-MCNC: 3.4 G/DL (ref 3.5–5.2)
ALP SERPL-CCNC: 67 U/L (ref 55–135)
ALT SERPL W/O P-5'-P-CCNC: 35 U/L (ref 10–44)
ANION GAP SERPL CALC-SCNC: 9 MMOL/L (ref 8–16)
APTT PPP: 30.6 SEC (ref 21–32)
AST SERPL-CCNC: 52 U/L (ref 10–40)
BASOPHILS # BLD AUTO: 0.01 K/UL (ref 0–0.2)
BASOPHILS NFR BLD: 0.3 % (ref 0–1.9)
BILIRUB SERPL-MCNC: 1.1 MG/DL (ref 0.1–1)
BUN SERPL-MCNC: 22 MG/DL (ref 10–30)
CALCIUM SERPL-MCNC: 8.7 MG/DL (ref 8.7–10.5)
CHLORIDE SERPL-SCNC: 106 MMOL/L (ref 95–110)
CK SERPL-CCNC: 109 U/L (ref 20–200)
CO2 SERPL-SCNC: 21 MMOL/L (ref 23–29)
CREAT SERPL-MCNC: 0.8 MG/DL (ref 0.5–1.4)
D DIMER PPP IA.FEU-MCNC: 1.1 MG/L FEU
DIFFERENTIAL METHOD BLD: ABNORMAL
EOSINOPHIL # BLD AUTO: 0 K/UL (ref 0–0.5)
EOSINOPHIL NFR BLD: 0.8 % (ref 0–8)
ERYTHROCYTE [DISTWIDTH] IN BLOOD BY AUTOMATED COUNT: 11.7 % (ref 11.5–14.5)
ERYTHROCYTE [SEDIMENTATION RATE] IN BLOOD BY WESTERGREN METHOD: 50 MM/HR (ref 0–10)
EST. GFR  (NO RACE VARIABLE): >60 ML/MIN/1.73 M^2
FERRITIN SERPL-MCNC: 2013 NG/ML (ref 20–300)
GLUCOSE SERPL-MCNC: 102 MG/DL (ref 70–110)
HCT VFR BLD AUTO: 46.4 % (ref 40–54)
HGB BLD-MCNC: 16.5 G/DL (ref 14–18)
IMM GRANULOCYTES # BLD AUTO: 0.01 K/UL (ref 0–0.04)
IMM GRANULOCYTES NFR BLD AUTO: 0.3 % (ref 0–0.5)
INR PPP: 1.1 (ref 0.8–1.2)
LDH SERPL L TO P-CCNC: 305 U/L (ref 110–260)
LYMPHOCYTES # BLD AUTO: 1.5 K/UL (ref 1–4.8)
LYMPHOCYTES NFR BLD: 40.1 % (ref 18–48)
MCH RBC QN AUTO: 30.8 PG (ref 27–31)
MCHC RBC AUTO-ENTMCNC: 35.6 G/DL (ref 32–36)
MCV RBC AUTO: 87 FL (ref 82–98)
MONOCYTES # BLD AUTO: 0.6 K/UL (ref 0.3–1)
MONOCYTES NFR BLD: 15.2 % (ref 4–15)
NEUTROPHILS # BLD AUTO: 1.7 K/UL (ref 1.8–7.7)
NEUTROPHILS NFR BLD: 43.3 % (ref 38–73)
NRBC BLD-RTO: 0 /100 WBC
PLATELET # BLD AUTO: 131 K/UL (ref 150–450)
PMV BLD AUTO: 11.4 FL (ref 9.2–12.9)
POTASSIUM SERPL-SCNC: 4.3 MMOL/L (ref 3.5–5.1)
PROT SERPL-MCNC: 7.8 G/DL (ref 6–8.4)
PROTHROMBIN TIME: 11.5 SEC (ref 9–12.5)
RBC # BLD AUTO: 5.35 M/UL (ref 4.6–6.2)
SODIUM SERPL-SCNC: 136 MMOL/L (ref 136–145)
TROPONIN I SERPL DL<=0.01 NG/ML-MCNC: 0.07 NG/ML (ref 0–0.03)
WBC # BLD AUTO: 3.82 K/UL (ref 3.9–12.7)

## 2024-01-22 PROCEDURE — 27000221 HC OXYGEN, UP TO 24 HOURS: Mod: HCNC

## 2024-01-22 PROCEDURE — 25000003 PHARM REV CODE 250: Mod: HCNC | Performed by: FAMILY MEDICINE

## 2024-01-22 PROCEDURE — 94640 AIRWAY INHALATION TREATMENT: CPT | Mod: HCNC

## 2024-01-22 PROCEDURE — 36415 COLL VENOUS BLD VENIPUNCTURE: CPT | Mod: HCNC,XB | Performed by: FAMILY MEDICINE

## 2024-01-22 PROCEDURE — 25000242 PHARM REV CODE 250 ALT 637 W/ HCPCS: Mod: HCNC | Performed by: FAMILY MEDICINE

## 2024-01-22 PROCEDURE — 96372 THER/PROPH/DIAG INJ SC/IM: CPT | Performed by: FAMILY MEDICINE

## 2024-01-22 PROCEDURE — 85379 FIBRIN DEGRADATION QUANT: CPT | Mod: HCNC | Performed by: FAMILY MEDICINE

## 2024-01-22 PROCEDURE — 94640 AIRWAY INHALATION TREATMENT: CPT | Mod: HCNC,XB

## 2024-01-22 PROCEDURE — 82728 ASSAY OF FERRITIN: CPT | Mod: HCNC | Performed by: FAMILY MEDICINE

## 2024-01-22 PROCEDURE — 36415 COLL VENOUS BLD VENIPUNCTURE: CPT | Mod: HCNC | Performed by: FAMILY MEDICINE

## 2024-01-22 PROCEDURE — 99213 OFFICE O/P EST LOW 20 MIN: CPT | Mod: HCNC,,, | Performed by: INTERNAL MEDICINE

## 2024-01-22 PROCEDURE — 85025 COMPLETE CBC W/AUTO DIFF WBC: CPT | Mod: HCNC | Performed by: FAMILY MEDICINE

## 2024-01-22 PROCEDURE — 84484 ASSAY OF TROPONIN QUANT: CPT | Mod: HCNC | Performed by: FAMILY MEDICINE

## 2024-01-22 PROCEDURE — 85651 RBC SED RATE NONAUTOMATED: CPT | Mod: HCNC | Performed by: FAMILY MEDICINE

## 2024-01-22 PROCEDURE — 85610 PROTHROMBIN TIME: CPT | Mod: HCNC | Performed by: FAMILY MEDICINE

## 2024-01-22 PROCEDURE — 83615 LACTATE (LD) (LDH) ENZYME: CPT | Mod: HCNC | Performed by: FAMILY MEDICINE

## 2024-01-22 PROCEDURE — 63600175 PHARM REV CODE 636 W HCPCS: Mod: HCNC | Performed by: FAMILY MEDICINE

## 2024-01-22 PROCEDURE — 80053 COMPREHEN METABOLIC PANEL: CPT | Mod: HCNC | Performed by: FAMILY MEDICINE

## 2024-01-22 PROCEDURE — XW033E5 INTRODUCTION OF REMDESIVIR ANTI-INFECTIVE INTO PERIPHERAL VEIN, PERCUTANEOUS APPROACH, NEW TECHNOLOGY GROUP 5: ICD-10-PCS | Performed by: FAMILY MEDICINE

## 2024-01-22 PROCEDURE — 85730 THROMBOPLASTIN TIME PARTIAL: CPT | Mod: HCNC | Performed by: FAMILY MEDICINE

## 2024-01-22 PROCEDURE — 82550 ASSAY OF CK (CPK): CPT | Mod: HCNC | Performed by: FAMILY MEDICINE

## 2024-01-22 PROCEDURE — G0378 HOSPITAL OBSERVATION PER HR: HCPCS | Mod: HCNC

## 2024-01-22 PROCEDURE — 99900035 HC TECH TIME PER 15 MIN (STAT): Mod: HCNC

## 2024-01-22 PROCEDURE — 94761 N-INVAS EAR/PLS OXIMETRY MLT: CPT | Mod: HCNC

## 2024-01-22 RX ORDER — ASCORBIC ACID 500 MG
500 TABLET ORAL 2 TIMES DAILY
Status: DISCONTINUED | OUTPATIENT
Start: 2024-01-22 | End: 2024-01-26 | Stop reason: HOSPADM

## 2024-01-22 RX ORDER — SODIUM CHLORIDE 0.9 % (FLUSH) 0.9 %
10 SYRINGE (ML) INJECTION
Status: DISCONTINUED | OUTPATIENT
Start: 2024-01-22 | End: 2024-01-26 | Stop reason: HOSPADM

## 2024-01-22 RX ORDER — ENOXAPARIN SODIUM 100 MG/ML
1 INJECTION SUBCUTANEOUS EVERY 12 HOURS
Status: DISCONTINUED | OUTPATIENT
Start: 2024-01-22 | End: 2024-01-22

## 2024-01-22 RX ORDER — IBUPROFEN 200 MG
16 TABLET ORAL
Status: DISCONTINUED | OUTPATIENT
Start: 2024-01-22 | End: 2024-01-26 | Stop reason: HOSPADM

## 2024-01-22 RX ORDER — ENOXAPARIN SODIUM 100 MG/ML
1 INJECTION SUBCUTANEOUS 2 TIMES DAILY
Status: DISCONTINUED | OUTPATIENT
Start: 2024-01-22 | End: 2024-01-26 | Stop reason: HOSPADM

## 2024-01-22 RX ORDER — GLUCAGON 1 MG
1 KIT INJECTION
Status: DISCONTINUED | OUTPATIENT
Start: 2024-01-22 | End: 2024-01-26 | Stop reason: HOSPADM

## 2024-01-22 RX ORDER — ALBUTEROL SULFATE 90 UG/1
2 AEROSOL, METERED RESPIRATORY (INHALATION) EVERY 8 HOURS
Status: DISCONTINUED | OUTPATIENT
Start: 2024-01-22 | End: 2024-01-24

## 2024-01-22 RX ORDER — IBUPROFEN 200 MG
24 TABLET ORAL
Status: DISCONTINUED | OUTPATIENT
Start: 2024-01-22 | End: 2024-01-26 | Stop reason: HOSPADM

## 2024-01-22 RX ADMIN — OXYCODONE HYDROCHLORIDE AND ACETAMINOPHEN 500 MG: 500 TABLET ORAL at 09:01

## 2024-01-22 RX ADMIN — ALBUTEROL SULFATE 2 PUFF: 90 AEROSOL, METERED RESPIRATORY (INHALATION) at 11:01

## 2024-01-22 RX ADMIN — ENOXAPARIN SODIUM 60 MG: 60 INJECTION SUBCUTANEOUS at 09:01

## 2024-01-22 RX ADMIN — FUROSEMIDE 20 MG: 20 TABLET ORAL at 08:01

## 2024-01-22 RX ADMIN — ALBUTEROL SULFATE 2 PUFF: 90 AEROSOL, METERED RESPIRATORY (INHALATION) at 04:01

## 2024-01-22 RX ADMIN — POLYETHYLENE GLYCOL 3350 17 G: 17 POWDER, FOR SOLUTION ORAL at 08:01

## 2024-01-22 RX ADMIN — ASPIRIN 81 MG CHEWABLE TABLET 81 MG: 81 TABLET CHEWABLE at 08:01

## 2024-01-22 NOTE — ASSESSMENT & PLAN NOTE
Patient with Paroxysmal (<7 days) atrial fibrillation which is controlled currently with  no meds 2/2 bradycardia . Patient is currently in atrial fibrillation.[id number] Score: 2. Anticoagulation not indicated due to age and risk of falls .

## 2024-01-22 NOTE — PROGRESS NOTES
[facility] - Telemetry ([facility])  [facility] Medicine  Progress Note    Patient Name: Long Albarran  MRN: [id number]  Patient Class: OP- Observation   Admission Date: 1/21/2024  Length of Stay: 0 days  Attending Physician: Betzy Bush MD  Primary Care Provider: Penny Watson DO        Subjective:     Principal Problem:Bradycardia        HPI:  Mr. Hines is a 89 yo [place] male with hx of Afib, CHF with diastolic dysfunction and hypertriglyceridemia presented with shortness of breathe and confusion per ER records.  In the ER, he was found to have elevated troponin x 2, bradycardia, and Covid 19+.  No family was at bedside and [facility] did not have a  available for [place] therefore Dr. Brooke Carrillo (ER MD) assisted in translating.  Patient stated he was having shortness of breathe, but denied any chest pain.  He has left shoulder pain but could not say how long that has been going on.  He is also blind.  Patient is not requiring NC o2 and chest xray showed no acute finding.  Dr. Molina was consulted for bradycardia and elevated troponin.  He recommended that he be admitted for observations.  Of note, Dr. Cross's note from 11/9/23 did note patient has bradycardia.  He is not on BB or anticoagulation for afib.  Hospital medicine will admit for observation.    Overview/Hospital Course:  1/22 admitted for bradycardia and confusion. Cardiology recommending treatment for covid and outpatient event cardiac monitor. Complains of fatigue, dyspnea, poor appetite. Continue covid treatment protocol. Physical/occupational therapy consulted    Interval History: See hospital course for today      Review of Systems   Constitutional:  Positive for activity change, appetite change and fatigue.   Eyes:  Positive for visual disturbance.   Respiratory:  Positive for shortness of breath.    Gastrointestinal:  Negative for nausea and vomiting.   Neurological:  Positive for weakness.   Psychiatric/Behavioral:  Positive for decreased  concentration. Negative for agitation, behavioral problems and confusion.      Objective:     Vital Signs (Most Recent):  Temp: 98.1 °F (36.7 °C) (01/22/24 1158)  Pulse: 75 (01/22/24 1154)  Resp: 20 (01/22/24 1154)  BP: (!) 150/70 (01/22/24 1154)  SpO2: 97 % (01/22/24 1154) Vital Signs (24h Range):  Temp:  [97.2 °F (36.2 °C)-98.4 °F (36.9 °C)] 98.1 °F (36.7 °C)  Pulse:  [53-75] 75  Resp:  [15-20] 20  SpO2:  [93 %-97 %] 97 %  BP: (111-157)/(54-70) 150/70     Weight: 64.1 kg (141 lb 5 oz)  Body mass index is 25.03 kg/m².    Intake/Output Summary (Last 24 hours) at 1/22/2024 1552  Last data filed at 1/22/2024 1418  Gross per 24 hour   Intake 480 ml   Output 350 ml   Net 130 ml         Physical Exam  Vitals and nursing note reviewed. Exam conducted with a chaperone present (daughter, ).   Constitutional:       General: He is awake. He is not in acute distress.     Appearance: He is ill-appearing. He is not toxic-appearing.      Interventions: Nasal cannula in place.   HENT:      Head: Normocephalic and atraumatic.   Cardiovascular:      Rate and Rhythm: Normal rate.   Pulmonary:      Effort: Tachypnea and respiratory distress present.   Abdominal:      Palpations: Abdomen is soft.      Tenderness: There is no abdominal tenderness.   Musculoskeletal:      Right lower leg: No edema.      Left lower leg: No edema.   Skin:     General: Skin is warm.   Neurological:      Mental Status: He is disoriented.      Motor: Weakness present.   Psychiatric:         Behavior: Behavior is cooperative.             Significant Labs: All pertinent labs within the past 24 hours have been reviewed.  CBC:   Recent Labs   Lab 01/22/24  0520   WBC 3.82*   HGB 16.5   HCT 46.4   *     CMP:   Recent Labs   Lab 01/21/24  0306 01/22/24  0520    136   K 4.9 4.3    106   CO2 23 21*    102   BUN 20 22   CREATININE 0.9 0.8   CALCIUM 8.8 8.7   PROT 7.7 7.8   ALBUMIN 3.4* 3.4*   BILITOT 0.8 1.1*   ALKPHOS 71 67   AST  61* 52*   ALT 39 35   ANIONGAP 7* 9     Troponin:   Recent Labs   Lab 01/21/24  0306 01/21/24  0626 01/21/24  1421   TROPONINI 0.096* 0.083* 0.068*       Significant Imaging: I have reviewed all pertinent imaging results/findings within the past 24 hours.  CT: I have reviewed all pertinent results/findings within the past 24 hours and my personal findings are:  no acute findings  CXR: I have reviewed all pertinent results/findings within the past 24 hours and my personal findings are:  no acute findings    Assessment/Plan:      * Bradycardia  - Cardiology consulted in in the ER due to bradycardia and elevated tropoinin   - Dr. Molina recommended observation  - tele monitoring    Follow up outpatient with event monitoring after covid recovery    COVID-19  - supportive management  - CXR negative  - On RA sating in the high 90s  - monitor for fevers    Complains of dyspnea and fatigue  Covid treatment protocol with dexamethasone, inhaler, remdesivir     Chronic diastolic congestive heart failure  Patient is identified as having Diastolic (HFpEF) heart failure that is Chronic. CHF is currently controlled. Latest ECHO performed and demonstrates- Results for orders placed during the hospital encounter of 02/17/22    Echo    Interpretation Summary  · The left ventricle is normal in size with moderate concentric hypertrophy and normal systolic function.  · The estimated ejection fraction is 55%.  · Indeterminate left ventricular diastolic function.  · Normal right ventricular size with normal right ventricular systolic function.  · Severe left atrial enlargement.  · Mild tricuspid regurgitation.  · Normal central venous pressure (3 mmHg).  · The estimated PA systolic pressure is 42 mmHg.  · There is pulmonary hypertension.  . Continue Furosemide and monitor clinical status closely. Monitor on telemetry. Patient is off CHF pathway.  Monitor strict Is&Os and daily weights.  Place on fluid restriction of 1.5 L. Cardiology has  been consulted. Continue to stress to patient importance of self efficacy and  on diet for CHF. Last BNP reviewed- and noted below   Recent Labs   Lab 01/21/24  0626   *       - continue lasix 20mg PO daily (home meds)    Atrial fibrillation  Patient with Paroxysmal (<7 days) atrial fibrillation which is controlled currently with  no meds 2/2 bradycardia . Patient is currently in atrial fibrillation.[id number] Score: 2. Anticoagulation not indicated due to age and risk of falls .      VTE Risk Mitigation (From admission, onward)           Ordered     enoxaparin injection 60 mg  2 times daily         01/22/24 1551     IP VTE HIGH RISK PATIENT  Once         01/21/24 1047     Place sequential compression device  Until discontinued         01/21/24 1047                 service utilized to facilitate exam  Ihsan #[id number]    Discharge Planning   MARLY: 1/22/2024     Code Status: Full Code   Is the patient medically ready for discharge?:     Reason for patient still in hospital (select all that apply): Patient trending condition, Laboratory test, Treatment, PT / OT recommendations, and Pending disposition  Discharge Plan A: Home with family   Discharge Delays: None known at this time              Betzy Bush MD  Department of Hospital Medicine   [facility] - Telemetry ([facility])

## 2024-01-22 NOTE — SUBJECTIVE & OBJECTIVE
Interval History: See hospital course for today      Review of Systems   Constitutional:  Positive for activity change, appetite change and fatigue.   Eyes:  Positive for visual disturbance.   Respiratory:  Positive for shortness of breath.    Gastrointestinal:  Negative for nausea and vomiting.   Neurological:  Positive for weakness.   Psychiatric/Behavioral:  Positive for decreased concentration. Negative for agitation, behavioral problems and confusion.      Objective:     Vital Signs (Most Recent):  Temp: 98.1 °F (36.7 °C) (01/22/24 1158)  Pulse: 75 (01/22/24 1154)  Resp: 20 (01/22/24 1154)  BP: (!) 150/70 (01/22/24 1154)  SpO2: 97 % (01/22/24 1154) Vital Signs (24h Range):  Temp:  [97.2 °F (36.2 °C)-98.4 °F (36.9 °C)] 98.1 °F (36.7 °C)  Pulse:  [53-75] 75  Resp:  [15-20] 20  SpO2:  [93 %-97 %] 97 %  BP: (111-157)/(54-70) 150/70     Weight: 64.1 kg (141 lb 5 oz)  Body mass index is 25.03 kg/m².    Intake/Output Summary (Last 24 hours) at 1/22/2024 1552  Last data filed at 1/22/2024 1418  Gross per 24 hour   Intake 480 ml   Output 350 ml   Net 130 ml         Physical Exam  Vitals and nursing note reviewed. Exam conducted with a chaperone present (daughter, ).   Constitutional:       General: He is awake. He is not in acute distress.     Appearance: He is ill-appearing. He is not toxic-appearing.      Interventions: Nasal cannula in place.   HENT:      Head: Normocephalic and atraumatic.   Cardiovascular:      Rate and Rhythm: Normal rate.   Pulmonary:      Effort: Tachypnea and respiratory distress present.   Abdominal:      Palpations: Abdomen is soft.      Tenderness: There is no abdominal tenderness.   Musculoskeletal:      Right lower leg: No edema.      Left lower leg: No edema.   Skin:     General: Skin is warm.   Neurological:      Mental Status: He is disoriented.      Motor: Weakness present.   Psychiatric:         Behavior: Behavior is cooperative.             Significant Labs: All pertinent  labs within the past 24 hours have been reviewed.  CBC:   Recent Labs   Lab 01/22/24  0520   WBC 3.82*   HGB 16.5   HCT 46.4   *     CMP:   Recent Labs   Lab 01/21/24  0306 01/22/24  0520    136   K 4.9 4.3    106   CO2 23 21*    102   BUN 20 22   CREATININE 0.9 0.8   CALCIUM 8.8 8.7   PROT 7.7 7.8   ALBUMIN 3.4* 3.4*   BILITOT 0.8 1.1*   ALKPHOS 71 67   AST 61* 52*   ALT 39 35   ANIONGAP 7* 9     Troponin:   Recent Labs   Lab 01/21/24  0306 01/21/24  0626 01/21/24  1421   TROPONINI 0.096* 0.083* 0.068*       Significant Imaging: I have reviewed all pertinent imaging results/findings within the past 24 hours.  CT: I have reviewed all pertinent results/findings within the past 24 hours and my personal findings are:  no acute findings  CXR: I have reviewed all pertinent results/findings within the past 24 hours and my personal findings are:  no acute findings

## 2024-01-22 NOTE — ASSESSMENT & PLAN NOTE
- supportive management  - CXR negative  - On RA sating in the high 90s  - monitor for fevers    Complains of dyspnea and fatigue  Covid treatment protocol with dexamethasone, inhaler, remdesivir

## 2024-01-22 NOTE — PLAN OF CARE
[facility] - Telemetry (Hospital)  Discharge Final Note    Primary Care Provider: Penny Watson DO    Expected Discharge Date: 1/22/2024    Final Discharge Note (most recent)       Final Note - 01/22/24 1245          Final Note    Assessment Type Final Discharge Note     Anticipated Discharge Disposition Home or Self Care     Hospital Resources/Appts/Education Provided Appointments scheduled and added to AVS        Post-Acute Status    Discharge Delays None known at this time                     Important Message from Medicare             DC Disposition: home with family  Family Notified: Patient by MD/ RN at bedside  Transportation: personal transportation    Patient had no equipment or placement needs from .     Patient has PCP hospital follow up with Penny Watson DO, on 1/26/24 at 9 am.

## 2024-01-22 NOTE — SUBJECTIVE & OBJECTIVE
Interval History:     Review of Systems   Constitutional: Negative. Negative for weight gain.   HENT: Negative.     Eyes: Negative.    Cardiovascular: Negative.  Negative for chest pain, leg swelling and palpitations.   Respiratory: Negative.  Negative for shortness of breath.    Endocrine: Negative.    Hematologic/Lymphatic: Negative.    Skin: Negative.    Musculoskeletal:  Negative for muscle weakness.   Gastrointestinal: Negative.    Genitourinary: Negative.    Neurological: Negative.  Negative for dizziness.   Psychiatric/Behavioral: Negative.     Allergic/Immunologic: Negative.    All other systems reviewed and are negative.    Objective:     Vital Signs (Most Recent):  Temp: 97.6 °F (36.4 °C) (01/22/24 0452)  Pulse: 61 (01/22/24 0452)  Resp: 16 (01/22/24 0452)  BP: (Abnormal) 122/56 (01/22/24 0452)  SpO2: 96 % (01/22/24 0452) Vital Signs (24h Range):  Temp:  [97.2 °F (36.2 °C)-98.4 °F (36.9 °C)] 97.6 °F (36.4 °C)  Pulse:  [48-72] 61  Resp:  [15-23] 16  SpO2:  [93 %-97 %] 96 %  BP: (111-164)/(56-70) 122/56     Weight: 64.1 kg (141 lb 5 oz)  Body mass index is 25.03 kg/m².     SpO2: 96 %         Intake/Output Summary (Last 24 hours) at 1/22/2024 0853  Last data filed at 1/21/2024 1804  Gross per 24 hour   Intake 240 ml   Output 350 ml   Net -110 ml       Lines/Drains/Airways       Peripheral Intravenous Line       Name Duration         Peripheral IV - Single Lumen 01/21/24 0314 Distal;Posterior;Right Forearm 1 day         Peripheral IV - Single Lumen 01/21/24 0315 22 G Left;Posterior Forearm 1 day                       Physical Exam       Significant Labs: All pertinent lab results from the last 24 hours have been reviewed.    Significant Imaging: X-Ray: CXR: X-Ray Chest 1 View (CXR): No results found for this visit on 01/21/24.

## 2024-01-22 NOTE — HOSPITAL COURSE
1-22-24 paroxysmal afib, 40s while sleeping bit 60-70s awake, NO high grade AV block , can go home on asa, follow up cardiology 1 week and we will schedule 1 week event monitor    1-25-24 still most of bradycardia at rest/sleeping , while in room waking hHR 50-70, I will come back later when family is there. We can walk him on treadmill ( when cleared for Covid) to test chronotropic response. In meantime I             will add imdur( causes reflex tachycardia)

## 2024-01-22 NOTE — NURSING
Received in bed awake and alert, resp even and unlabored with expiratory wheezes noted. Assessment per flowsheet, denies any pain or discomfort at this time. Plan of care discussed. Pt family verbalized understanding.

## 2024-01-22 NOTE — PROGRESS NOTES
[facility] - Telemetry ([facility])  Cardiology  Progress Note    Patient Name: Long Albarran  MRN: [id number]  Admission Date: 1/21/2024  Hospital Length of Stay: 0 days  Code Status: Full Code   Attending Physician: Betzy Bush MD   Primary Care Physician: Penny Watson DO  Expected Discharge Date:   Principal Problem:Bradycardia    Subjective:     Hospital Course:   1-22-24 paroxysmal afib, 40s while sleeping bit 60-70s awake, NO high grade AV block , can go home on asa, follow up cardiology 1 week and we will schedule 1 week event monitor    Interval History:     Review of Systems   Constitutional: Negative. Negative for weight gain.   HENT: Negative.     Eyes: Negative.    Cardiovascular: Negative.  Negative for chest pain, leg swelling and palpitations.   Respiratory: Negative.  Negative for shortness of breath.    Endocrine: Negative.    Hematologic/Lymphatic: Negative.    Skin: Negative.    Musculoskeletal:  Negative for muscle weakness.   Gastrointestinal: Negative.    Genitourinary: Negative.    Neurological: Negative.  Negative for dizziness.   Psychiatric/Behavioral: Negative.     Allergic/Immunologic: Negative.    All other systems reviewed and are negative.    Objective:     Vital Signs (Most Recent):  Temp: 97.6 °F (36.4 °C) (01/22/24 0452)  Pulse: 61 (01/22/24 0452)  Resp: 16 (01/22/24 0452)  BP: (Abnormal) 122/56 (01/22/24 0452)  SpO2: 96 % (01/22/24 0452) Vital Signs (24h Range):  Temp:  [97.2 °F (36.2 °C)-98.4 °F (36.9 °C)] 97.6 °F (36.4 °C)  Pulse:  [48-72] 61  Resp:  [15-23] 16  SpO2:  [93 %-97 %] 96 %  BP: (111-164)/(56-70) 122/56     Weight: 64.1 kg (141 lb 5 oz)  Body mass index is 25.03 kg/m².     SpO2: 96 %         Intake/Output Summary (Last 24 hours) at 1/22/2024 0818  Last data filed at 1/21/2024 1804  Gross per 24 hour   Intake 240 ml   Output 350 ml   Net -110 ml       Lines/Drains/Airways       Peripheral Intravenous Line       Name Duration         Peripheral IV - Single Lumen 01/21/24  0314 Distal;Posterior;Right Forearm 1 day         Peripheral IV - Single Lumen 01/21/24 0315 22 G Left;Posterior Forearm 1 day                       Physical Exam       Significant Labs: All pertinent lab results from the last 24 hours have been reviewed.    Significant Imaging: X-Ray: CXR: X-Ray Chest 1 View (CXR): No results found for this visit on 01/21/24.  Assessment and Plan:     Brief HPI:     * Bradycardia  PATIENT HAS VARIABLE CONDUCTION ABNORMALITY DENOTING SSS DUE TO HIS W[place]KEBACH AND VARIATION IN 2:1 BLOCK AS WELL AS HIGH GRADE BLOCK WILL MONITOR IF REMAINS HEMODYNAMICALLY STABLE HE WILL BENEFIT FROM OUTPATIENT MONITOR AND FROM EP EVALUATION.    Chronic diastolic congestive heart failure      Echo    Interpretation Summary  · The left ventricle is normal in size with moderate concentric hypertrophy and normal systolic function.  · The estimated ejection fraction is 55%.  · Indeterminate left ventricular diastolic function.  · Normal right ventricular size with normal right ventricular systolic function.  · Severe left atrial enlargement.  · Mild tricuspid regurgitation.  · Normal central venous pressure (3 mmHg).  · The estimated PA systolic pressure is 42 mmHg.  · There is pulmonary hypertension.      HE YHAS MILD ELEVATION OF BNP SECONDARY TO HIS ILLNESS AND BRADYCARDIA WILL MONITOR.    Atrial fibrillation  NONE CLINICALLY WILL MONITOR        VTE Risk Mitigation (From admission, onward)           Ordered     enoxaparin injection 40 mg  Daily         01/21/24 1047     IP VTE HIGH RISK PATIENT  Once         01/21/24 1047     Place sequential compression device  Until discontinued         01/21/24 1047                    Herbert Guzman MD  Cardiology  [facility] - Telemetry ([facility])

## 2024-01-22 NOTE — PLAN OF CARE
[facility] - Telemetry (Hospital)  Discharge Assessment    Primary Care Provider: Penny Watson DO     Discharge Assessment (most recent)       BRIEF DISCHARGE ASSESSMENT - 01/22/24 0936          Discharge Planning    Assessment Type Discharge Planning Brief Assessment     Resource/Environmental Concerns none     Support Systems Children     Equipment Currently Used at Home none     Current Living Arrangements home     Patient/Family Anticipates Transition to home     Patient/Family Anticipated Services at Transition none     DME Needed Upon Discharge  none     Discharge Plan A Home with family

## 2024-01-22 NOTE — ASSESSMENT & PLAN NOTE
- Cardiology consulted in in the ER due to bradycardia and elevated tropoinin   - Dr. Molina recommended observation  - tele monitoring    Follow up outpatient with event monitoring after covid recovery

## 2024-01-23 LAB
ALBUMIN SERPL BCP-MCNC: 3.3 G/DL (ref 3.5–5.2)
ALP SERPL-CCNC: 63 U/L (ref 55–135)
ALT SERPL W/O P-5'-P-CCNC: 30 U/L (ref 10–44)
ANION GAP SERPL CALC-SCNC: 13 MMOL/L (ref 8–16)
AST SERPL-CCNC: 52 U/L (ref 10–40)
BASOPHILS # BLD AUTO: 0.02 K/UL (ref 0–0.2)
BASOPHILS NFR BLD: 0.4 % (ref 0–1.9)
BILIRUB SERPL-MCNC: 1 MG/DL (ref 0.1–1)
BUN SERPL-MCNC: 22 MG/DL (ref 10–30)
CALCIUM SERPL-MCNC: 8.4 MG/DL (ref 8.7–10.5)
CHLORIDE SERPL-SCNC: 105 MMOL/L (ref 95–110)
CO2 SERPL-SCNC: 19 MMOL/L (ref 23–29)
CREAT SERPL-MCNC: 0.9 MG/DL (ref 0.5–1.4)
DIFFERENTIAL METHOD BLD: ABNORMAL
EOSINOPHIL # BLD AUTO: 0 K/UL (ref 0–0.5)
EOSINOPHIL NFR BLD: 0.7 % (ref 0–8)
ERYTHROCYTE [DISTWIDTH] IN BLOOD BY AUTOMATED COUNT: 11.6 % (ref 11.5–14.5)
EST. GFR  (NO RACE VARIABLE): >60 ML/MIN/1.73 M^2
GLUCOSE SERPL-MCNC: 102 MG/DL (ref 70–110)
HCT VFR BLD AUTO: 45.1 % (ref 40–54)
HGB BLD-MCNC: 16.1 G/DL (ref 14–18)
IMM GRANULOCYTES # BLD AUTO: 0.01 K/UL (ref 0–0.04)
IMM GRANULOCYTES NFR BLD AUTO: 0.2 % (ref 0–0.5)
LYMPHOCYTES # BLD AUTO: 1.6 K/UL (ref 1–4.8)
LYMPHOCYTES NFR BLD: 34.5 % (ref 18–48)
MCH RBC QN AUTO: 30.5 PG (ref 27–31)
MCHC RBC AUTO-ENTMCNC: 35.7 G/DL (ref 32–36)
MCV RBC AUTO: 85 FL (ref 82–98)
MONOCYTES # BLD AUTO: 0.7 K/UL (ref 0.3–1)
MONOCYTES NFR BLD: 14.5 % (ref 4–15)
NEUTROPHILS # BLD AUTO: 2.3 K/UL (ref 1.8–7.7)
NEUTROPHILS NFR BLD: 49.7 % (ref 38–73)
NRBC BLD-RTO: 0 /100 WBC
PLATELET # BLD AUTO: 139 K/UL (ref 150–450)
PMV BLD AUTO: 10.9 FL (ref 9.2–12.9)
POTASSIUM SERPL-SCNC: 3.9 MMOL/L (ref 3.5–5.1)
PROT SERPL-MCNC: 7.5 G/DL (ref 6–8.4)
RBC # BLD AUTO: 5.28 M/UL (ref 4.6–6.2)
SODIUM SERPL-SCNC: 137 MMOL/L (ref 136–145)
WBC # BLD AUTO: 4.61 K/UL (ref 3.9–12.7)

## 2024-01-23 PROCEDURE — 97530 THERAPEUTIC ACTIVITIES: CPT | Mod: HCNC

## 2024-01-23 PROCEDURE — 36415 COLL VENOUS BLD VENIPUNCTURE: CPT | Mod: HCNC | Performed by: FAMILY MEDICINE

## 2024-01-23 PROCEDURE — 63600175 PHARM REV CODE 636 W HCPCS: Mod: HCNC | Performed by: FAMILY MEDICINE

## 2024-01-23 PROCEDURE — 85025 COMPLETE CBC W/AUTO DIFF WBC: CPT | Mod: HCNC | Performed by: FAMILY MEDICINE

## 2024-01-23 PROCEDURE — 80053 COMPREHEN METABOLIC PANEL: CPT | Mod: HCNC | Performed by: FAMILY MEDICINE

## 2024-01-23 PROCEDURE — 94761 N-INVAS EAR/PLS OXIMETRY MLT: CPT | Mod: HCNC

## 2024-01-23 PROCEDURE — 94640 AIRWAY INHALATION TREATMENT: CPT | Mod: HCNC

## 2024-01-23 PROCEDURE — 27000207 HC ISOLATION: Mod: HCNC

## 2024-01-23 PROCEDURE — 25000003 PHARM REV CODE 250: Mod: HCNC | Performed by: FAMILY MEDICINE

## 2024-01-23 PROCEDURE — 97166 OT EVAL MOD COMPLEX 45 MIN: CPT | Mod: HCNC

## 2024-01-23 PROCEDURE — 21400001 HC TELEMETRY ROOM: Mod: HCNC

## 2024-01-23 PROCEDURE — 94618 PULMONARY STRESS TESTING: CPT | Mod: HCNC

## 2024-01-23 PROCEDURE — 97161 PT EVAL LOW COMPLEX 20 MIN: CPT | Mod: HCNC

## 2024-01-23 RX ADMIN — ASPIRIN 81 MG CHEWABLE TABLET 81 MG: 81 TABLET CHEWABLE at 10:01

## 2024-01-23 RX ADMIN — ALBUTEROL SULFATE 2 PUFF: 90 AEROSOL, METERED RESPIRATORY (INHALATION) at 08:01

## 2024-01-23 RX ADMIN — ENOXAPARIN SODIUM 60 MG: 60 INJECTION SUBCUTANEOUS at 09:01

## 2024-01-23 RX ADMIN — POLYETHYLENE GLYCOL 3350 17 G: 17 POWDER, FOR SOLUTION ORAL at 10:01

## 2024-01-23 RX ADMIN — FUROSEMIDE 20 MG: 20 TABLET ORAL at 10:01

## 2024-01-23 RX ADMIN — DEXAMETHASONE 6 MG: 4 TABLET ORAL at 10:01

## 2024-01-23 RX ADMIN — OXYCODONE HYDROCHLORIDE AND ACETAMINOPHEN 500 MG: 500 TABLET ORAL at 09:01

## 2024-01-23 RX ADMIN — REMDESIVIR 100 MG: 100 INJECTION, POWDER, LYOPHILIZED, FOR SOLUTION INTRAVENOUS at 10:01

## 2024-01-23 RX ADMIN — ALBUTEROL SULFATE 2 PUFF: 90 AEROSOL, METERED RESPIRATORY (INHALATION) at 04:01

## 2024-01-23 RX ADMIN — THERA TABS 1 TABLET: TAB at 10:01

## 2024-01-23 NOTE — PLAN OF CARE
Problem: Infection  Goal: Absence of Infection Signs and Symptoms  Outcome: Ongoing, Progressing  Intervention: Prevent or Manage Infection  Flowsheets (Taken 1/23/2024 1613)  Fever Reduction/Comfort Measures:   lightweight clothing   lightweight bedding  Infection Management: aseptic technique maintained  Isolation Precautions:   precautions maintained   airborne   contact   droplet     Problem: Fall Injury Risk  Goal: Absence of Fall and Fall-Related Injury  Outcome: Ongoing, Progressing  Intervention: Identify and Manage Contributors  Flowsheets (Taken 1/23/2024 1613)  Self-Care Promotion:   independence encouraged   BADL personal objects within reach   BADL personal routines maintained   meal set-up provided   safe use of adaptive equipment encouraged  Medication Review/Management: medications reviewed     Problem: Skin Injury Risk Increased  Goal: Skin Health and Integrity  Outcome: Ongoing, Progressing  Intervention: Optimize Skin Protection  Flowsheets (Taken 1/23/2024 1613)  Pressure Reduction Techniques: frequent weight shift encouraged  Skin Protection: adhesive use limited  Head of Bed (HOB) Positioning: HOB at 15 degrees

## 2024-01-23 NOTE — PT/OT/SLP EVAL
"Occupational Therapy Evaluation and Treatment    Name: Long Albarran  MRN: [id number]  Admitting Diagnosis: Bradycardia  Recent Surgery: * No surgery found *      Recommendations:     Discharge Recommendations: Low Intensity Therapy (24/7 SPV and A)  Level of Assistance Recommended: 24 hours significant assistance  Discharge Equipment Recommendations: walker, rolling, bath bench  Barriers to discharge: None    Assessment:     Long Albarran is a 89 y.o. male with a medical diagnosis of Bradycardia. He presents with performance deficits affecting function including weakness, impaired endurance, impaired self care skills, impaired functional mobility, impaired balance, impaired cardiopulmonary response to activity, decreased safety awareness.    Rehab Prognosis: Fair; patient would benefit from acute OT services to address these deficits and reach maximum level of function.    Plan:     Patient to be seen 2 x/week to address the above listed problems via self-care/home management, therapeutic activities, therapeutic exercises  Plan of Care Expires: 02/06/24  Plan of Care Reviewed with: patient, daughter, spouse    Subjective     Chief Complaint: Daughter reported "He is doing ok now. It just doesn't make sense that he gets dizzy."  Patient Comments/Goals: none reported  Pain/Comfort:  Decline overt discomfort  Daughter reported intermittent c/o chest pain    Daughter present and serving as  throughout session.    Social History:  Living Environment: Patient lives with their spouse and daughter and son in a single story home with  no steps/stairs to enter home.  Children provide 24/7 SPV and A.  Prior Level of Function: Prior to admission, patient was modified independent with ADLs and household distance ambulation via SPC. Family with intermittent A as needed.  Roles and Routines: Patient was not driving and not working prior to admission.  Equipment Used at Home: cane, straight  DME owned (not currently " used): none  Assistance Upon Discharge: family    Objective:     Communicated with nurse, Indira, prior to session. Patient found supine with peripheral IV, telemetry upon OT entry to room.    General Precautions: Standard, fall, airborne, contact, droplet   Orthopedic Precautions: N/A   Braces: N/A    Respiratory Status: Room air    Occupational Performance    Bed Mobility:   Supine to sit from right side of bed with minimum assistance    Functional Mobility/Transfers:  Sit <> Stand Transfer with minimum assistance with hand-held assist  Bed <> Chair Transfer using Step Transfer technique with minimum assistance with hand-held assist  Functional Mobility: Patient completed x20ft functional mobility with min HHA to increase dynamic standing balance and activity tolerance needed for ADL completion.  Daughter present and actively engaged in mobility.  Declined dizziness with HR 70 or > throughout.    Activities of Daily Living:  Grooming: set up assistance  Lower Body Dressing: daughter donning socks for patient while seated EOB    Cognitive/Visual Perceptual:  Cognitive/Psychosocial Skills:    -     following daughters commands without difficulty  Grossly appropriate, formal orientation not completed  Able to decline dizziness throughout session.    Physical Exam:  Balance:    -     Sitting: stand by assistance  -     Standing: minimum assistance  Upper Extremity Range of Motion:     -       Right Upper Extremity: WFL  -       Left Upper Extremity: WFL  Upper Extremity Strength:    -       Right Upper Extremity: Deficits: grossly 4-/5  -       Left Upper Extremity: Deficits: grossly 4-/5   Strength:    -       Right Upper Extremity: Deficits: fair  -       Left Upper Extremity: Deficits: fair    AMPAC 6 Click ADL:  AMPAC Total Score: 16    Treatment & Education:  Therapist provided facilitation and instruction of proper body mechanics, energy conservation, and fall prevention strategies during tasks listed  above  Patient educated on role of OT, POC, and goals for therapy  Patient educated on importance of OOB activities with staff member assistance and sitting OOB majority of the day  Encouraged completion of B UE AROM therex throughout the day to increase functional strength and activity tolerance needed for ADL completion.    Daughter stated understanding of all education.    Patient left up in chair with all lines intact, call button in reach, chair alarm on, and daughter and spouse present. Nurse made aware of progression of treatment and mobility needs.    GOALS:   Multidisciplinary Problems       Occupational Therapy Goals          Problem: Occupational Therapy    Goal Priority Disciplines Outcome Interventions   Occupational Therapy Goal     OT, PT/OT     Description: Goals to be met by: 2/6/24     Patient will increase functional independence with ADLs by performing:    Toileting from toilet with Contact Guard Assistance for hygiene and clothing management.   Toilet transfer to toilet with Contact Guard Assistance.  Increased functional strength in B UE grossly by 1/2 MM grade.                         History:     Past Medical History:   Diagnosis Date    A-fib     Diastolic dysfunction 12/2017    Hypertriglyceridemia          Past Surgical History:   Procedure Laterality Date    ABDOMINAL SURGERY      appendix removal in [place] 3 years ago    CATARACT EXTRACTION, BILATERAL         Time Tracking:     OT Date of Treatment: 01/23/24  OT Start Time: 1025  OT Stop Time: 1050  OT Total Time (min): 25 min    Billable Minutes: Evaluation 15 and Therapeutic Activity 10    Miladis Hernandez, OT  1/23/2024

## 2024-01-23 NOTE — RESPIRATORY THERAPY
Home Oxygen Evaluation - [facility] - Cardiopulmonary Department      Date Performed: 1/23/2024      1) Patient's Home O2 Sat on room air, while at rest: Room Air SpO2 At Rest: 97 %        If O2 sats on room air at rest are 88% or below, patient qualifies.  Document O2 liter flow needed in Step 2.  If O2 sats are 89% or above, complete Step 3.        2)  If patient is not ambulated and O2 sats are <88%, what is the O2 liter flow required to meet ordered saturation? Home O2 Eval Comments: does not qualify for home oxygen    If O2 sats on room air while exercising remain 89% or above patient does not qualify, no further testing needed Document N/A in step 3. If O2 sats on room air while exercising are 88% or below, continue to Step 4.    3) Patient's O2 Sat on room air while exercising: Room Air SpO2 During Ambulation: 95 %        4) Patient's O2 Sat while exercising on O2:   at           (Must show improvement from #4 for patients to qualify)

## 2024-01-23 NOTE — PT/OT/SLP EVAL
Physical Therapy Evaluation    Patient Name:  Long Albarran   MRN:  [id number]    Recommendations:     Discharge Recommendations: Low Intensity Therapy (WITH CONT. 24 HOUR CARE)   Discharge Equipment Recommendations: walker, rolling, bath bench   Barriers to discharge: None    Assessment:     Long Albarran is a 89 y.o. male admitted with a medical diagnosis of Bradycardia.  He presents with the following impairments/functional limitations: weakness, impaired endurance, impaired functional mobility, gait instability, impaired balance, decreased safety awareness, decreased coordination.    The mobility limitation cannot be sufficiently resolved by the use of a cane.   Patient's functional mobility deficit can be sufficiently resolved with the use of a rolling walker. Patient's mobility limitation significantly impairs their ability to participate in one of more activities of daily living. The use of a rolling walker will significantly improve the patient's ability to participate in MRADLS and the patient will use it on regular basis in the home.     This patient has a mobility limitation that   Prevents them entirely  from accomplishing MRADL's in customary locations in the home   Places them at reasonable determined heightened risk of mobility or mortality secondary to attempts to perform an MRADL   Prevents them from completing MRADL's in a reasonable time frame  This patient requires a bedside commode / 3 in 1 commode because they are physically incapable of utilizing their regular toilet facility for a 30-90 day period due to current non-ambulatory status. The patient will be confined to a single room or to one level of the home and there is no toilet on that level, or there are no toilet facilities in the home.        Rehab Prognosis: Good; patient would benefit from acute skilled PT services to address these deficits and reach maximum level of function.    Recent Surgery: * No surgery found *     Plan:      During this hospitalization, patient to be seen 3 x/week to address the identified rehab impairments via gait training, therapeutic activities, therapeutic exercises and progress toward the following goals:    Plan of Care Expires:  02/05/24    Subjective     Chief Complaint: NONE, AGREEABLE TO TX  Patient/Family Comments/goals:   Pain/Comfort:  Pain Rating 1: 0/10    Patients cultural, spiritual, Uatsdin conflicts given the current situation:      Living Environment:  PT LIVES WITH SPOUSE AND DAUGHTER, 1 STORY HOUSE NO STEPS, AMB INDEP OR USING SPC HOUSEHOLD DISTANCES, DOES NOT DRIVE OR WORK, INDEP WITH ADL'S  Prior to admission, patients level of function was DIEGO WITH HOUSEHOLD DISTANCES.  Equipment used at home: cane, straight.  DME owned (not currently used): none.  Upon discharge, patient will have assistance from FAMILY.    Objective:     Communicated with NURSE MEGAN prior to session.  Patient found supine with telemetry, peripheral IV  upon PT entry to room.    General Precautions: Standard, airborne, contact, droplet, fall, PT IS NON-ENGLISH SPEAKING, DAUGHTER PRESENT TO TRANSLATE  Orthopedic Precautions:N/A   Braces: N/A  Respiratory Status: Room air    Exams:  Cognitive Exam:  Patient is oriented to Person, Place, and Time  Postural Exam:  Patient presented with the following abnormalities:    -       Rounded shoulders  Sensation:    -       Intact  RLE ROM: WFL  RLE Strength: WFL  LLE ROM: WFL  LLE Strength: WFL    Functional Mobility:  Bed Mobility:     Rolling Left:  minimum assistance  Scooting: minimum assistance  Supine to Sit: minimum assistance  Transfers:     Sit to Stand:  minimum assistance with no AD and hand-held assist  Bed to Chair: minimum assistance with  no AD and hand-held assist  using  Step Transfer  Gait: PT AMB 25' IN ROOM WITH HAND HELD ASSIST/FAWN, NO LOB OR SOB ON ROOM AIR, PT DENIES DIZZINESS   Balance: GOOD SITTING BALANCE, POOR+ DYNAMIC BALANCE DURING GAIT  HR IN  "SUPINE AT RES: 63 BPM, DURING EXERTION IN ROOM: 73 BPM    AM-PAC 6 CLICK MOBILITY  Total Score:16     Treatment & Education:  PT EDUCATION:  - ROLE OF P.T. AND POC IN ACUTE CARE HOSPITAL SETTING  - ENCOURAGED TO INCREASE TIME OOB IN CHAIR TO TOLERANCE   - TO CONTINUE THERAPUETIC EXERCISES THROUGHOUT THE DAY TO INCREASE ACTIVITY TOLERANCE AND DECREASE RISK FOR PNEUMONIA AND BLOOD CLOTS: HIP FLEX/EXT, HIP ABD/ADD, QUAD SET, HEEL SLIDE, AP  - RISK FOR FALLS DUE TO GENERALIZED WEAKNESS, EDUCATED ON "CALL DON'T FALL", ENCOURAGED TO CALL FOR ASSISTANCE WITH ALL NEEDS SUCH AS BED<>CHAIR TRANSFERS OR TRIPS TO BATHROOM, PT AGREEABLE TO SAFETY PRECAUTIONS    Patient left up in chair with all lines intact, call button in reach, chair alarm on, NURSE notified, and FAMILY present.    GOALS:   Multidisciplinary Problems       Physical Therapy Goals          Problem: Physical Therapy    Goal Priority Disciplines Outcome Goal Variances Interventions   Physical Therapy Goal     PT, PT/OT      Description: LTG'S TO BE MET IN 14 DAYS (2-6-24)  PT WILL REQUIRE SPV FOR BED MOBILITY  PT WILL REQUIRE SBA FOR BED<>CHAIR TF'S  PT WILL  FEET WITH OR WITHOUT APPROPRIATE AD WITH SBA  PT WILL INC AMPAC SCORE BY 2 POINTS TO PROGRESS GROSS FUNC MOBILITY                         History:     Past Medical History:   Diagnosis Date    A-fib     Diastolic dysfunction 12/2017    Hypertriglyceridemia        Past Surgical History:   Procedure Laterality Date    ABDOMINAL SURGERY      appendix removal in [place] 3 years ago    CATARACT EXTRACTION, BILATERAL         Time Tracking:     PT Received On: 01/23/24  PT Start Time: 1030     PT Stop Time: 1055  PT Total Time (min): 25 min     Billable Minutes: Evaluation 15 and Therapeutic Activity 10    01/23/2024  "

## 2024-01-23 NOTE — ASSESSMENT & PLAN NOTE
- Cardiology consulted in in the ER due to bradycardia and elevated tropoinin   - Dr. Molina recommended observation  - tele monitoring    Improving  Follow up outpatient with event monitoring after covid recovery

## 2024-01-23 NOTE — PLAN OF CARE
OT noir completed. Sup>sit min A, sit>stand min A, functional mobility x20ft with min HHA, step>pivot to bedside chair with min HHA. Recommending low intensity intervention and 24/7 SPV and A at d/c.

## 2024-01-23 NOTE — PLAN OF CARE
A235/A235 DEVI Albarran is a 89 y.o.male admitted on 1/21/2024 for Bradycardia   Code Status: Full Code MRN: [id number]   Review of patient's allergies indicates:  No Known Allergies  Past Medical History:   Diagnosis Date    A-fib     Diastolic dysfunction 12/2017    Hypertriglyceridemia       PRN meds    acetaminophen, 650 mg, Q4H PRN  dextrose 10%, 12.5 g, PRN  dextrose 10%, 12.5 g, PRN  dextrose 10%, 25 g, PRN  dextrose 10%, 25 g, PRN  glucagon (human recombinant), 1 mg, PRN  glucagon (human recombinant), 1 mg, PRN  glucose, 16 g, PRN  glucose, 16 g, PRN  glucose, 24 g, PRN  glucose, 24 g, PRN  HYDROcodone-acetaminophen, 1 tablet, Q6H PRN  naloxone, 0.02 mg, PRN  ondansetron, 8 mg, Q8H PRN  prochlorperazine, 5 mg, Q6H PRN  sodium chloride 0.9%, 10 mL, Q12H PRN  sodium chloride 0.9%, 10 mL, PRN      Chart check completed. Will continue plan of care.      Orientation: oriented x 4  [place] Coma Scale Score: 15     Lead Monitored: Lead II Rhythm: atrial rhythm    Cardiac/Telemetry Box Number: [id number]  VTE Required Core Measure: Pharmacological prophylaxis initiated/maintained Last Bowel Movement: 01/21/24  Diet Adult Regular (IDDSI Level 7)  Voiding Characteristics: voids spontaneously without difficulty  Danny Score: 19  Fall Risk Score: 11  Accucheck []   Freq?      Lines/Drains/Airways       Peripheral Intravenous Line  Duration                  Peripheral IV - Single Lumen 01/21/24 0314 Distal;Posterior;Right Forearm 2 days         Peripheral IV - Single Lumen 01/21/24 0315 22 G Left;Posterior Forearm 2 days

## 2024-01-23 NOTE — PLAN OF CARE
Inpatient Upgrade Note    Long Albarran has warranted treatment spanning two or more midnights of hospital level care for the management of  Covid-19 and Bradycardia . He continues to require supplemental oxygen, monitoring of vital signs, further evaluation by consultants, and IV Remdesivir, dexamethasone and inhaler . His condition is also complicated by the following comorbidities:  CHF, and A-Fib .

## 2024-01-23 NOTE — ASSESSMENT & PLAN NOTE
Patient with Paroxysmal (<7 days) atrial fibrillation which is controlled currently with  no meds 2/2 bradycardia . Patient is currently in atrial fibrillation.[id number] Score: 2. Anticoagulation not indicated due to age and risk of falls .  Repeat electrocardiography in am

## 2024-01-23 NOTE — SUBJECTIVE & OBJECTIVE
Interval History: See hospital course for today      Review of Systems   Constitutional:  Positive for activity change (improved), appetite change (improved) and fatigue (improved). Negative for fever.   HENT:  Positive for hearing loss.    Respiratory:  Positive for shortness of breath (improved).    Gastrointestinal:  Negative for abdominal pain, nausea and vomiting.   Neurological:  Positive for syncope (per daughter off and on) and weakness (improved).   Psychiatric/Behavioral:  Negative for agitation, behavioral problems, confusion and decreased concentration. The patient is not nervous/anxious.      Objective:     Vital Signs (Most Recent):  Temp: 97.9 °F (36.6 °C) (01/23/24 1013)  Pulse: 73 (01/23/24 1620)  Resp: 20 (01/23/24 1620)  BP: 135/74 (01/23/24 1013)  SpO2: 96 % (01/23/24 1620) Vital Signs (24h Range):  Temp:  [97.9 °F (36.6 °C)-98.7 °F (37.1 °C)] 97.9 °F (36.6 °C)  Pulse:  [61-77] 73  Resp:  [18-20] 20  SpO2:  [96 %-99 %] 96 %  BP: (127-145)/(62-74) 135/74     Weight: 58.2 kg (128 lb 4.9 oz)  Body mass index is 22.73 kg/m².    Intake/Output Summary (Last 24 hours) at 1/23/2024 1633  Last data filed at 1/23/2024 0400  Gross per 24 hour   Intake --   Output 500 ml   Net -500 ml         Physical Exam  Vitals and nursing note reviewed. Exam conducted with a chaperone present (daughter).   Constitutional:       General: He is not in acute distress.     Appearance: He is ill-appearing. He is not toxic-appearing.   HENT:      Head: Normocephalic and atraumatic.   Cardiovascular:      Rate and Rhythm: Normal rate.   Pulmonary:      Effort: Pulmonary effort is normal. No respiratory distress.      Breath sounds: Decreased air movement present. Wheezing present.   Abdominal:      Palpations: Abdomen is soft.      Tenderness: There is no abdominal tenderness.   Musculoskeletal:      Right lower leg: No edema.      Left lower leg: No edema.   Skin:     General: Skin is warm.   Neurological:      Mental Status:  He is alert. Mental status is at baseline.      Motor: Weakness present.             Significant Labs: All pertinent labs within the past 24 hours have been reviewed.  CBC:   Recent Labs   Lab 01/22/24  0520 01/23/24  0448   WBC 3.82* 4.61   HGB 16.5 16.1   HCT 46.4 45.1   * 139*     CMP:   Recent Labs   Lab 01/22/24  0520 01/23/24 0448    137   K 4.3 3.9    105   CO2 21* 19*    102   BUN 22 22   CREATININE 0.8 0.9   CALCIUM 8.7 8.4*   PROT 7.8 7.5   ALBUMIN 3.4* 3.3*   BILITOT 1.1* 1.0   ALKPHOS 67 63   AST 52* 52*   ALT 35 30   ANIONGAP 9 13       Significant Imaging: I have reviewed all pertinent imaging results/findings within the past 24 hours.

## 2024-01-23 NOTE — PLAN OF CARE
P.T. EVAL COMPLETE.  PT CURRENTLY REQUIRES FAWN FOR BED MOBILITY, TF'S, AND GAIT WITH HHA.  P.T. RECOMMENDS LOW INTENSITY THERAPY UPON DISCHARGE

## 2024-01-23 NOTE — PROGRESS NOTES
[facility] ([facility])  [facility] Medicine  Progress Note    Patient Name: Long Albarran  MRN: [id number]  Patient Class: IP- Inpatient   Admission Date: 1/21/2024  Length of Stay: 0 days  Attending Physician: Betzy Bush MD  Primary Care Provider: Penny Watson DO        Subjective:     Principal Problem:Bradycardia        HPI:  Mr. Hines is a 89 yo [place] male with hx of Afib, CHF with diastolic dysfunction and hypertriglyceridemia presented with shortness of breathe and confusion per ER records.  In the ER, he was found to have elevated troponin x 2, bradycardia, and Covid 19+.  No family was at bedside and YAYA did not have a  available for [employer] therefore Dr. Brooke Carrillo (ER MD) assisted in translating.  Patient stated he was having shortness of breathe, but denied any chest pain.  He has left shoulder pain but could not say how long that has been going on.  He is also blind.  Patient is not requiring NC o2 and chest xray showed no acute finding.  Dr. Molina was consulted for bradycardia and elevated troponin.  He recommended that he be admitted for observations.  Of note, Dr. Cross's note from 11/9/23 did note patient has bradycardia.  He is not on BB or anticoagulation for afib.  Hospital medicine will admit for observation.    Overview/Hospital Course:  1/22 admitted for bradycardia and confusion. Cardiology recommending treatment for covid and outpatient event cardiac monitor. Complains of fatigue, dyspnea, poor appetite. Continue covid treatment protocol. Physical/occupational therapy consulted  1/23 notes improvement in breathing and fatigue. Does not qualify for home oxygen based on ambulatory oxygen evaluation. Physical/occupational therapy recommending homehealth physical/occupational therapy. Anticipate discharge 1-2 days. Bradycardia improved.    Interval History: See hospital course for today      Review of Systems   Constitutional:  Positive for activity change (improved),  appetite change (improved) and fatigue (improved). Negative for fever.   HENT:  Positive for hearing loss.    Respiratory:  Positive for shortness of breath (improved).    Gastrointestinal:  Negative for abdominal pain, nausea and vomiting.   Neurological:  Positive for syncope (per daughter off and on) and weakness (improved).   Psychiatric/Behavioral:  Negative for agitation, behavioral problems, confusion and decreased concentration. The patient is not nervous/anxious.      Objective:     Vital Signs (Most Recent):  Temp: 97.9 °F (36.6 °C) (01/23/24 1013)  Pulse: 73 (01/23/24 1620)  Resp: 20 (01/23/24 1620)  BP: 135/74 (01/23/24 1013)  SpO2: 96 % (01/23/24 1620) Vital Signs (24h Range):  Temp:  [97.9 °F (36.6 °C)-98.7 °F (37.1 °C)] 97.9 °F (36.6 °C)  Pulse:  [61-77] 73  Resp:  [18-20] 20  SpO2:  [96 %-99 %] 96 %  BP: (127-145)/(62-74) 135/74     Weight: 58.2 kg (128 lb 4.9 oz)  Body mass index is 22.73 kg/m².    Intake/Output Summary (Last 24 hours) at 1/23/2024 1633  Last data filed at 1/23/2024 0400  Gross per 24 hour   Intake --   Output 500 ml   Net -500 ml         Physical Exam  Vitals and nursing note reviewed. Exam conducted with a chaperone present (daughter).   Constitutional:       General: He is not in acute distress.     Appearance: He is ill-appearing. He is not toxic-appearing.   HENT:      Head: Normocephalic and atraumatic.   Cardiovascular:      Rate and Rhythm: Normal rate.   Pulmonary:      Effort: Pulmonary effort is normal. No respiratory distress.      Breath sounds: Decreased air movement present. Wheezing present.   Abdominal:      Palpations: Abdomen is soft.      Tenderness: There is no abdominal tenderness.   Musculoskeletal:      Right lower leg: No edema.      Left lower leg: No edema.   Skin:     General: Skin is warm.   Neurological:      Mental Status: He is alert. Mental status is at baseline.      Motor: Weakness present.             Significant Labs: All pertinent labs within  the past 24 hours have been reviewed.  CBC:   Recent Labs   Lab 01/22/24  0520 01/23/24  0448   WBC 3.82* 4.61   HGB 16.5 16.1   HCT 46.4 45.1   * 139*     CMP:   Recent Labs   Lab 01/22/24  0520 01/23/24  0448    137   K 4.3 3.9    105   CO2 21* 19*    102   BUN 22 22   CREATININE 0.8 0.9   CALCIUM 8.7 8.4*   PROT 7.8 7.5   ALBUMIN 3.4* 3.3*   BILITOT 1.1* 1.0   ALKPHOS 67 63   AST 52* 52*   ALT 35 30   ANIONGAP 9 13       Significant Imaging: I have reviewed all pertinent imaging results/findings within the past 24 hours.    Assessment/Plan:      * Bradycardia  - Cardiology consulted in in the ER due to bradycardia and elevated tropoinin   - Dr. Molina recommended observation  - tele monitoring    Improving  Follow up outpatient with event monitoring after covid recovery    COVID-19  - supportive management  - CXR negative  - On RA sating in the high 90s  - monitor for fevers    Complains of dyspnea and fatigue  Covid treatment protocol with dexamethasone, inhaler, remdesivir     Chronic diastolic congestive heart failure  Patient is identified as having Diastolic (HFpEF) heart failure that is Chronic. CHF is currently controlled. Latest ECHO performed and demonstrates- Results for orders placed during the hospital encounter of 02/17/22    Echo    Interpretation Summary  · The left ventricle is normal in size with moderate concentric hypertrophy and normal systolic function.  · The estimated ejection fraction is 55%.  · Indeterminate left ventricular diastolic function.  · Normal right ventricular size with normal right ventricular systolic function.  · Severe left atrial enlargement.  · Mild tricuspid regurgitation.  · Normal central venous pressure (3 mmHg).  · The estimated PA systolic pressure is 42 mmHg.  · There is pulmonary hypertension.  . Continue Furosemide and monitor clinical status closely. Monitor on telemetry. Patient is off CHF pathway.  Monitor strict Is&Os and daily  weights.  Place on fluid restriction of 1.5 L. Cardiology has been consulted. Continue to stress to patient importance of self efficacy and  on diet for CHF. Last BNP reviewed- and noted below   Recent Labs   Lab 01/21/24  0626   *       - continue lasix 20mg PO daily (home meds)    Atrial fibrillation  Patient with Paroxysmal (<7 days) atrial fibrillation which is controlled currently with  no meds 2/2 bradycardia . Patient is currently in atrial fibrillation.[id number] Score: 2. Anticoagulation not indicated due to age and risk of falls .  Repeat electrocardiography in am      VTE Risk Mitigation (From admission, onward)           Ordered     enoxaparin injection 60 mg  2 times daily         01/22/24 1551     IP VTE HIGH RISK PATIENT  Once         01/21/24 1047     Place sequential compression device  Until discontinued         01/21/24 1047                    Discharge Planning   MARLY: 1/22/2024     Code Status: Full Code   Is the patient medically ready for discharge?:     Reason for patient still in hospital (select all that apply): Patient trending condition, Treatment, Consult recommendations, PT / OT recommendations, and Pending disposition  Discharge Plan A: Home with family   Discharge Delays: None known at this time              Betzy Bush MD  Department of Hospital Medicine   [facility] - Tele[facility] ([facility])

## 2024-01-24 ENCOUNTER — PATIENT OUTREACH (OUTPATIENT)
Dept: ADMINISTRATIVE | Facility: HOSPITAL | Age: 89
End: 2024-01-24
Payer: MEDICARE

## 2024-01-24 LAB
ALBUMIN SERPL BCP-MCNC: 3.3 G/DL (ref 3.5–5.2)
ALP SERPL-CCNC: 68 U/L (ref 55–135)
ALT SERPL W/O P-5'-P-CCNC: 27 U/L (ref 10–44)
ANION GAP SERPL CALC-SCNC: 7 MMOL/L (ref 8–16)
AST SERPL-CCNC: 37 U/L (ref 10–40)
BASOPHILS # BLD AUTO: 0 K/UL (ref 0–0.2)
BASOPHILS NFR BLD: 0 % (ref 0–1.9)
BILIRUB SERPL-MCNC: 0.8 MG/DL (ref 0.1–1)
BUN SERPL-MCNC: 28 MG/DL (ref 10–30)
CALCIUM SERPL-MCNC: 9 MG/DL (ref 8.7–10.5)
CHLORIDE SERPL-SCNC: 107 MMOL/L (ref 95–110)
CO2 SERPL-SCNC: 21 MMOL/L (ref 23–29)
CREAT SERPL-MCNC: 0.9 MG/DL (ref 0.5–1.4)
D DIMER PPP IA.FEU-MCNC: 3.85 MG/L FEU
DIFFERENTIAL METHOD BLD: ABNORMAL
EOSINOPHIL # BLD AUTO: 0 K/UL (ref 0–0.5)
EOSINOPHIL NFR BLD: 0 % (ref 0–8)
ERYTHROCYTE [DISTWIDTH] IN BLOOD BY AUTOMATED COUNT: 11.3 % (ref 11.5–14.5)
EST. GFR  (NO RACE VARIABLE): >60 ML/MIN/1.73 M^2
FERRITIN SERPL-MCNC: 1353 NG/ML (ref 20–300)
GLUCOSE SERPL-MCNC: 135 MG/DL (ref 70–110)
HCT VFR BLD AUTO: 44.2 % (ref 40–54)
HGB BLD-MCNC: 15.7 G/DL (ref 14–18)
IMM GRANULOCYTES # BLD AUTO: 0 K/UL (ref 0–0.04)
IMM GRANULOCYTES NFR BLD AUTO: 0 % (ref 0–0.5)
LDH SERPL L TO P-CCNC: 408 U/L (ref 110–260)
LYMPHOCYTES # BLD AUTO: 1.2 K/UL (ref 1–4.8)
LYMPHOCYTES NFR BLD: 39.2 % (ref 18–48)
MCH RBC QN AUTO: 29.8 PG (ref 27–31)
MCHC RBC AUTO-ENTMCNC: 35.5 G/DL (ref 32–36)
MCV RBC AUTO: 84 FL (ref 82–98)
MONOCYTES # BLD AUTO: 0.4 K/UL (ref 0.3–1)
MONOCYTES NFR BLD: 12 % (ref 4–15)
NEUTROPHILS # BLD AUTO: 1.5 K/UL (ref 1.8–7.7)
NEUTROPHILS NFR BLD: 48.8 % (ref 38–73)
NRBC BLD-RTO: 0 /100 WBC
PLATELET # BLD AUTO: 149 K/UL (ref 150–450)
PMV BLD AUTO: 11.1 FL (ref 9.2–12.9)
POTASSIUM SERPL-SCNC: 4 MMOL/L (ref 3.5–5.1)
PROT SERPL-MCNC: 7.8 G/DL (ref 6–8.4)
RBC # BLD AUTO: 5.27 M/UL (ref 4.6–6.2)
SODIUM SERPL-SCNC: 135 MMOL/L (ref 136–145)
WBC # BLD AUTO: 3.16 K/UL (ref 3.9–12.7)

## 2024-01-24 PROCEDURE — 36415 COLL VENOUS BLD VENIPUNCTURE: CPT | Mod: HCNC | Performed by: FAMILY MEDICINE

## 2024-01-24 PROCEDURE — 85025 COMPLETE CBC W/AUTO DIFF WBC: CPT | Mod: HCNC | Performed by: FAMILY MEDICINE

## 2024-01-24 PROCEDURE — 93005 ELECTROCARDIOGRAM TRACING: CPT | Mod: HCNC

## 2024-01-24 PROCEDURE — 83615 LACTATE (LD) (LDH) ENZYME: CPT | Mod: HCNC | Performed by: FAMILY MEDICINE

## 2024-01-24 PROCEDURE — 93010 ELECTROCARDIOGRAM REPORT: CPT | Mod: HCNC,,, | Performed by: INTERNAL MEDICINE

## 2024-01-24 PROCEDURE — 25000003 PHARM REV CODE 250: Mod: HCNC | Performed by: FAMILY MEDICINE

## 2024-01-24 PROCEDURE — 80053 COMPREHEN METABOLIC PANEL: CPT | Mod: HCNC | Performed by: FAMILY MEDICINE

## 2024-01-24 PROCEDURE — 85379 FIBRIN DEGRADATION QUANT: CPT | Mod: HCNC | Performed by: FAMILY MEDICINE

## 2024-01-24 PROCEDURE — 36415 COLL VENOUS BLD VENIPUNCTURE: CPT | Mod: HCNC,XB | Performed by: FAMILY MEDICINE

## 2024-01-24 PROCEDURE — 94640 AIRWAY INHALATION TREATMENT: CPT | Mod: HCNC

## 2024-01-24 PROCEDURE — 94761 N-INVAS EAR/PLS OXIMETRY MLT: CPT | Mod: HCNC

## 2024-01-24 PROCEDURE — 63600175 PHARM REV CODE 636 W HCPCS: Mod: JZ,TB,HCNC | Performed by: FAMILY MEDICINE

## 2024-01-24 PROCEDURE — 21400001 HC TELEMETRY ROOM: Mod: HCNC

## 2024-01-24 PROCEDURE — 82728 ASSAY OF FERRITIN: CPT | Mod: HCNC | Performed by: FAMILY MEDICINE

## 2024-01-24 PROCEDURE — 27000207 HC ISOLATION: Mod: HCNC

## 2024-01-24 RX ORDER — ALBUTEROL SULFATE 90 UG/1
2 AEROSOL, METERED RESPIRATORY (INHALATION)
Status: DISCONTINUED | OUTPATIENT
Start: 2024-01-24 | End: 2024-01-25

## 2024-01-24 RX ADMIN — POLYETHYLENE GLYCOL 3350 17 G: 17 POWDER, FOR SOLUTION ORAL at 09:01

## 2024-01-24 RX ADMIN — ENOXAPARIN SODIUM 60 MG: 60 INJECTION SUBCUTANEOUS at 09:01

## 2024-01-24 RX ADMIN — THERA TABS 1 TABLET: TAB at 09:01

## 2024-01-24 RX ADMIN — ALBUTEROL SULFATE 2 PUFF: 90 AEROSOL, METERED RESPIRATORY (INHALATION) at 08:01

## 2024-01-24 RX ADMIN — DEXAMETHASONE 6 MG: 4 TABLET ORAL at 09:01

## 2024-01-24 RX ADMIN — REMDESIVIR 100 MG: 100 INJECTION, POWDER, LYOPHILIZED, FOR SOLUTION INTRAVENOUS at 09:01

## 2024-01-24 RX ADMIN — ALBUTEROL SULFATE 2 PUFF: 90 AEROSOL, METERED RESPIRATORY (INHALATION) at 12:01

## 2024-01-24 RX ADMIN — OXYCODONE HYDROCHLORIDE AND ACETAMINOPHEN 500 MG: 500 TABLET ORAL at 09:01

## 2024-01-24 RX ADMIN — ASPIRIN 81 MG CHEWABLE TABLET 81 MG: 81 TABLET CHEWABLE at 09:01

## 2024-01-24 RX ADMIN — ALBUTEROL SULFATE 2 PUFF: 90 AEROSOL, METERED RESPIRATORY (INHALATION) at 07:01

## 2024-01-24 RX ADMIN — ALBUTEROL SULFATE 2 PUFF: 90 AEROSOL, METERED RESPIRATORY (INHALATION) at 02:01

## 2024-01-24 RX ADMIN — FUROSEMIDE 20 MG: 20 TABLET ORAL at 09:01

## 2024-01-24 NOTE — PLAN OF CARE
A235/A235 DEVI Albarran is a 89 y.o.male admitted on 1/21/2024 for Bradycardia   Code Status: Full Code MRN: [id number]   Review of patient's allergies indicates:  No Known Allergies  Past Medical History:   Diagnosis Date    A-fib     Diastolic dysfunction 12/2017    Hypertriglyceridemia       PRN meds    acetaminophen, 650 mg, Q4H PRN  dextrose 10%, 12.5 g, PRN  dextrose 10%, 12.5 g, PRN  dextrose 10%, 25 g, PRN  dextrose 10%, 25 g, PRN  glucagon (human recombinant), 1 mg, PRN  glucagon (human recombinant), 1 mg, PRN  glucose, 16 g, PRN  glucose, 16 g, PRN  glucose, 24 g, PRN  glucose, 24 g, PRN  HYDROcodone-acetaminophen, 1 tablet, Q6H PRN  naloxone, 0.02 mg, PRN  ondansetron, 8 mg, Q8H PRN  prochlorperazine, 5 mg, Q6H PRN  sodium chloride 0.9%, 10 mL, Q12H PRN  sodium chloride 0.9%, 10 mL, PRN      Chart check completed. Will continue plan of care.      Orientation: oriented x 4  [place] Coma Scale Score: 15     Lead Monitored: Lead II Rhythm: atrial rhythm    Cardiac/Telemetry Box Number: [id number]  VTE Required Core Measure: Pharmacological prophylaxis initiated/maintained Last Bowel Movement: 01/23/24  Diet Adult Regular (IDDSI Level 7)  Voiding Characteristics: voids spontaneously without difficulty  Danny Score: 19  Fall Risk Score: 11  Accucheck []   Freq?      Lines/Drains/Airways       Peripheral Intravenous Line  Duration                  Peripheral IV - Single Lumen 01/21/24 0314 Distal;Posterior;Right Forearm 3 days         Peripheral IV - Single Lumen 01/21/24 0315 22 G Left;Posterior Forearm 3 days

## 2024-01-24 NOTE — PT/OT/SLP PROGRESS
Physical Therapy      Patient Name:  Long Albarran   MRN:  [id number]    Chart review complete. Presented to pt's room at 1420. Patient not seen today secondary to pt unavailable, meeting with staff. Will continue efforts.    Ju Allen, PT, DPT  1/24/2024   1420

## 2024-01-24 NOTE — ASSESSMENT & PLAN NOTE
- Cardiology consulted in in the ER due to bradycardia and elevated tropoinin   - Dr. Molina recommended observation  - tele monitoring    Intermittent episodes of bradycardia  Family reports syncope and collapse at home  Cardiology following

## 2024-01-24 NOTE — PROGRESS NOTES
[facility] - Tele[facility] ([facility])  [facility] Medicine  Progress Note    Patient Name: Long Albarran  MRN: [id number]  Patient Class: IP- Inpatient   Admission Date: 1/21/2024  Length of Stay: 1 days  Attending Physician: Betzy Bush MD  Primary Care Provider: Penny Watson DO        Subjective:     Principal Problem:Bradycardia        HPI:  Mr. Hines is a 89 yo [place] male with hx of Afib, CHF with diastolic dysfunction and hypertriglyceridemia presented with shortness of breathe and confusion per ER records.  In the ER, he was found to have elevated troponin x 2, bradycardia, and Covid 19+.  No family was at bedside and YAYA did not have a  available for [employer] therefore Dr. Brooke Carrillo (ER MD) assisted in translating.  Patient stated he was having shortness of breathe, but denied any chest pain.  He has left shoulder pain but could not say how long that has been going on.  He is also blind.  Patient is not requiring NC o2 and chest xray showed no acute finding.  Dr. Molina was consulted for bradycardia and elevated troponin.  He recommended that he be admitted for observations.  Of note, Dr. Cross's note from 11/9/23 did note patient has bradycardia.  He is not on BB or anticoagulation for afib.  Hospital medicine will admit for observation.    Overview/Hospital Course:  1/22 admitted for bradycardia and confusion. Cardiology recommending treatment for covid and outpatient event cardiac monitor. Complains of fatigue, dyspnea, poor appetite. Continue covid treatment protocol. Physical/occupational therapy consulted  1/23 notes improvement in breathing and fatigue. Does not qualify for home oxygen based on ambulatory oxygen evaluation. Physical/occupational therapy recommending homehealth physical/occupational therapy. Anticipate discharge 1-2 days. Bradycardia improved.  1/24 bradycardia persists. Hypoxia improved, not requiring supplemental oxygen. Electrocardiography pending.    Interval History: See  hospital course for today      Review of Systems   Reason unable to perform ROS: collateral provided by family.   Constitutional:  Positive for activity change and fatigue.   Respiratory:  Negative for shortness of breath.    Cardiovascular:  Negative for chest pain.   Neurological:  Positive for syncope and weakness.     Objective:     Vital Signs (Most Recent):  Temp: 97.6 °F (36.4 °C) (01/24/24 1553)  Pulse: (!) 49 (01/24/24 1553)  Resp: 16 (01/24/24 1553)  BP: (!) 137/58 (01/24/24 1553)  SpO2: (!) 94 % (01/24/24 1553) Vital Signs (24h Range):  Temp:  [97.4 °F (36.3 °C)-98.1 °F (36.7 °C)] 97.6 °F (36.4 °C)  Pulse:  [49-78] 49  Resp:  [16-20] 16  SpO2:  [94 %-97 %] 94 %  BP: (131-152)/(58-75) 137/58     Weight: 58.2 kg (128 lb 4.9 oz)  Body mass index is 22.73 kg/m².  No intake or output data in the 24 hours ending 01/24/24 1630      Physical Exam  Vitals and nursing note reviewed.   Constitutional:       General: He is not in acute distress.     Appearance: He is ill-appearing. He is not toxic-appearing.   HENT:      Head: Normocephalic and atraumatic.   Cardiovascular:      Rate and Rhythm: Bradycardia present.   Pulmonary:      Effort: Pulmonary effort is normal. No respiratory distress.      Breath sounds: No wheezing or rales.   Abdominal:      Palpations: Abdomen is soft.      Tenderness: There is no abdominal tenderness.   Musculoskeletal:      Right lower leg: No edema.      Left lower leg: No edema.   Skin:     General: Skin is warm.   Neurological:      Mental Status: Mental status is at baseline.      Motor: Weakness present.             Significant Labs: All pertinent labs within the past 24 hours have been reviewed.  CBC:   Recent Labs   Lab 01/23/24  0448 01/24/24  0431   WBC 4.61 3.16*   HGB 16.1 15.7   HCT 45.1 44.2   * 149*     CMP:   Recent Labs   Lab 01/23/24  0448 01/24/24  0431    135*   K 3.9 4.0    107   CO2 19* 21*    135*   BUN 22 28   CREATININE 0.9 0.9   CALCIUM  8.4* 9.0   PROT 7.5 7.8   ALBUMIN 3.3* 3.3*   BILITOT 1.0 0.8   ALKPHOS 63 68   AST 52* 37   ALT 30 27   ANIONGAP 13 7*       Significant Imaging: I have reviewed all pertinent imaging results/findings within the past 24 hours.  EKG: I have reviewed all pertinent results/findings within the past 24 hours and my personal findings are: pending    Assessment/Plan:      * Bradycardia  - Cardiology consulted in in the ER due to bradycardia and elevated tropoinin   - Dr. Molina recommended observation  - tele monitoring    Intermittent episodes of bradycardia  Family reports syncope and collapse at home  Cardiology following    COVID-19  - supportive management  - CXR negative  - On RA sating in the high 90s  - monitor for fevers    Improving, on room air  Covid treatment protocol with dexamethasone, inhaler, remdesivir     Chronic diastolic congestive heart failure  Patient is identified as having Diastolic (HFpEF) heart failure that is Chronic. CHF is currently controlled. Latest ECHO performed and demonstrates- Results for orders placed during the hospital encounter of 02/17/22    Echo    Interpretation Summary  · The left ventricle is normal in size with moderate concentric hypertrophy and normal systolic function.  · The estimated ejection fraction is 55%.  · Indeterminate left ventricular diastolic function.  · Normal right ventricular size with normal right ventricular systolic function.  · Severe left atrial enlargement.  · Mild tricuspid regurgitation.  · Normal central venous pressure (3 mmHg).  · The estimated PA systolic pressure is 42 mmHg.  · There is pulmonary hypertension.  . Continue Furosemide and monitor clinical status closely. Monitor on telemetry. Patient is off CHF pathway.  Monitor strict Is&Os and daily weights.  Place on fluid restriction of 1.5 L. Cardiology has been consulted. Continue to stress to patient importance of self efficacy and  on diet for CHF. Last BNP reviewed- and noted  below   Recent Labs   Lab 01/21/24  0626   *       - continue lasix 20mg PO daily (home meds)    Atrial fibrillation  Patient with Paroxysmal (<7 days) atrial fibrillation which is controlled currently with  no meds 2/2 bradycardia . Patient is currently in atrial fibrillation.[id number] Score: 2. Anticoagulation not indicated due to age and risk of falls .  Repeat electrocardiography in am      VTE Risk Mitigation (From admission, onward)           Ordered     enoxaparin injection 60 mg  2 times daily         01/22/24 1551     IP VTE HIGH RISK PATIENT  Once         01/21/24 1047     Place sequential compression device  Until discontinued         01/21/24 1047                    Discharge Planning   MARLY: 1/22/2024     Code Status: Full Code   Is the patient medically ready for discharge?:     Reason for patient still in hospital (select all that apply): Patient trending condition, Treatment, Imaging, Consult recommendations, and PT / OT recommendations  Discharge Plan A: Home with family   Discharge Delays: None known at this time              Betzy Bush MD  Department of Hospital Medicine   [facility] - Telemetry ([facility])

## 2024-01-24 NOTE — ASSESSMENT & PLAN NOTE
Patient with Paroxysmal (<7 days) atrial fibrillation which is controlled currently with  no meds 2/2 bradycardia . Patient is currently in atrial fibrillation.[id number] Score: 2. Anticoagulation not indicated due to age and risk of falls .  Repeat electrocardiography in am

## 2024-01-24 NOTE — PROGRESS NOTES
Called patient to confirm hospital follow up scheduled on 1/26/2024.   Spoke with Ms Hines (daughter) to confirm appt. States he is still in the hospital and not sure when he is going to be discharged.

## 2024-01-24 NOTE — PLAN OF CARE
Problem: Infection  Goal: Absence of Infection Signs and Symptoms  Outcome: Ongoing, Progressing     Problem: Fall Injury Risk  Goal: Absence of Fall and Fall-Related Injury  Outcome: Ongoing, Progressing     Problem: Skin Injury Risk Increased  Goal: Skin Health and Integrity  Outcome: Ongoing, Progressing

## 2024-01-24 NOTE — ASSESSMENT & PLAN NOTE
- supportive management  - CXR negative  - On RA sating in the high 90s  - monitor for fevers    Improving, on room air  Covid treatment protocol with dexamethasone, inhaler, remdesivir

## 2024-01-24 NOTE — SUBJECTIVE & OBJECTIVE
Interval History: See hospital course for today      Review of Systems   Reason unable to perform ROS: collateral provided by family.   Constitutional:  Positive for activity change and fatigue.   Respiratory:  Negative for shortness of breath.    Cardiovascular:  Negative for chest pain.   Neurological:  Positive for syncope and weakness.     Objective:     Vital Signs (Most Recent):  Temp: 97.6 °F (36.4 °C) (01/24/24 1553)  Pulse: (!) 49 (01/24/24 1553)  Resp: 16 (01/24/24 1553)  BP: (!) 137/58 (01/24/24 1553)  SpO2: (!) 94 % (01/24/24 1553) Vital Signs (24h Range):  Temp:  [97.4 °F (36.3 °C)-98.1 °F (36.7 °C)] 97.6 °F (36.4 °C)  Pulse:  [49-78] 49  Resp:  [16-20] 16  SpO2:  [94 %-97 %] 94 %  BP: (131-152)/(58-75) 137/58     Weight: 58.2 kg (128 lb 4.9 oz)  Body mass index is 22.73 kg/m².  No intake or output data in the 24 hours ending 01/24/24 1630      Physical Exam  Vitals and nursing note reviewed.   Constitutional:       General: He is not in acute distress.     Appearance: He is ill-appearing. He is not toxic-appearing.   HENT:      Head: Normocephalic and atraumatic.   Cardiovascular:      Rate and Rhythm: Bradycardia present.   Pulmonary:      Effort: Pulmonary effort is normal. No respiratory distress.      Breath sounds: No wheezing or rales.   Abdominal:      Palpations: Abdomen is soft.      Tenderness: There is no abdominal tenderness.   Musculoskeletal:      Right lower leg: No edema.      Left lower leg: No edema.   Skin:     General: Skin is warm.   Neurological:      Mental Status: Mental status is at baseline.      Motor: Weakness present.             Significant Labs: All pertinent labs within the past 24 hours have been reviewed.  CBC:   Recent Labs   Lab 01/23/24  0448 01/24/24  0431   WBC 4.61 3.16*   HGB 16.1 15.7   HCT 45.1 44.2   * 149*     CMP:   Recent Labs   Lab 01/23/24  0448 01/24/24  0431    135*   K 3.9 4.0    107   CO2 19* 21*    135*   BUN 22 28    CREATININE 0.9 0.9   CALCIUM 8.4* 9.0   PROT 7.5 7.8   ALBUMIN 3.3* 3.3*   BILITOT 1.0 0.8   ALKPHOS 63 68   AST 52* 37   ALT 30 27   ANIONGAP 13 7*       Significant Imaging: I have reviewed all pertinent imaging results/findings within the past 24 hours.  EKG: I have reviewed all pertinent results/findings within the past 24 hours and my personal findings are: pending

## 2024-01-25 LAB
ANISOCYTOSIS BLD QL SMEAR: SLIGHT
BASOPHILS # BLD AUTO: 0 K/UL (ref 0–0.2)
BASOPHILS NFR BLD: 0 % (ref 0–1.9)
DIFFERENTIAL METHOD BLD: ABNORMAL
EOSINOPHIL # BLD AUTO: 0 K/UL (ref 0–0.5)
EOSINOPHIL NFR BLD: 0 % (ref 0–8)
ERYTHROCYTE [DISTWIDTH] IN BLOOD BY AUTOMATED COUNT: 11.4 % (ref 11.5–14.5)
HCT VFR BLD AUTO: 41.1 % (ref 40–54)
HGB BLD-MCNC: 14.6 G/DL (ref 14–18)
IMM GRANULOCYTES # BLD AUTO: 0.03 K/UL (ref 0–0.04)
IMM GRANULOCYTES NFR BLD AUTO: 0.5 % (ref 0–0.5)
LYMPHOCYTES # BLD AUTO: 1.3 K/UL (ref 1–4.8)
LYMPHOCYTES NFR BLD: 20.3 % (ref 18–48)
MCH RBC QN AUTO: 29.9 PG (ref 27–31)
MCHC RBC AUTO-ENTMCNC: 35.5 G/DL (ref 32–36)
MCV RBC AUTO: 84 FL (ref 82–98)
MONOCYTES # BLD AUTO: 0.7 K/UL (ref 0.3–1)
MONOCYTES NFR BLD: 11.3 % (ref 4–15)
NEUTROPHILS # BLD AUTO: 4.3 K/UL (ref 1.8–7.7)
NEUTROPHILS NFR BLD: 67.9 % (ref 38–73)
NRBC BLD-RTO: 0 /100 WBC
PLATELET # BLD AUTO: 149 K/UL (ref 150–450)
PLATELET BLD QL SMEAR: ABNORMAL
PMV BLD AUTO: 11 FL (ref 9.2–12.9)
RBC # BLD AUTO: 4.89 M/UL (ref 4.6–6.2)
WBC # BLD AUTO: 6.3 K/UL (ref 3.9–12.7)

## 2024-01-25 PROCEDURE — 94761 N-INVAS EAR/PLS OXIMETRY MLT: CPT | Mod: HCNC

## 2024-01-25 PROCEDURE — 94640 AIRWAY INHALATION TREATMENT: CPT | Mod: HCNC

## 2024-01-25 PROCEDURE — 97530 THERAPEUTIC ACTIVITIES: CPT | Mod: HCNC

## 2024-01-25 PROCEDURE — 21400001 HC TELEMETRY ROOM: Mod: HCNC

## 2024-01-25 PROCEDURE — 27000207 HC ISOLATION: Mod: HCNC

## 2024-01-25 PROCEDURE — 99900035 HC TECH TIME PER 15 MIN (STAT): Mod: HCNC

## 2024-01-25 PROCEDURE — 63600175 PHARM REV CODE 636 W HCPCS: Mod: JZ,TB,HCNC | Performed by: FAMILY MEDICINE

## 2024-01-25 PROCEDURE — 85025 COMPLETE CBC W/AUTO DIFF WBC: CPT | Mod: HCNC | Performed by: FAMILY MEDICINE

## 2024-01-25 PROCEDURE — 36415 COLL VENOUS BLD VENIPUNCTURE: CPT | Mod: HCNC | Performed by: FAMILY MEDICINE

## 2024-01-25 PROCEDURE — 25000003 PHARM REV CODE 250: Mod: HCNC | Performed by: FAMILY MEDICINE

## 2024-01-25 PROCEDURE — 97110 THERAPEUTIC EXERCISES: CPT | Mod: HCNC

## 2024-01-25 PROCEDURE — 25000003 PHARM REV CODE 250: Mod: HCNC | Performed by: INTERNAL MEDICINE

## 2024-01-25 RX ORDER — ALBUTEROL SULFATE 90 UG/1
2 AEROSOL, METERED RESPIRATORY (INHALATION)
Status: DISCONTINUED | OUTPATIENT
Start: 2024-01-25 | End: 2024-01-26 | Stop reason: HOSPADM

## 2024-01-25 RX ORDER — ISOSORBIDE MONONITRATE 30 MG/1
30 TABLET, EXTENDED RELEASE ORAL DAILY
Status: DISCONTINUED | OUTPATIENT
Start: 2024-01-25 | End: 2024-01-26 | Stop reason: HOSPADM

## 2024-01-25 RX ADMIN — FUROSEMIDE 20 MG: 20 TABLET ORAL at 09:01

## 2024-01-25 RX ADMIN — OXYCODONE HYDROCHLORIDE AND ACETAMINOPHEN 500 MG: 500 TABLET ORAL at 08:01

## 2024-01-25 RX ADMIN — ASPIRIN 81 MG CHEWABLE TABLET 81 MG: 81 TABLET CHEWABLE at 09:01

## 2024-01-25 RX ADMIN — DEXAMETHASONE 6 MG: 4 TABLET ORAL at 09:01

## 2024-01-25 RX ADMIN — ALBUTEROL SULFATE 2 PUFF: 90 AEROSOL, METERED RESPIRATORY (INHALATION) at 07:01

## 2024-01-25 RX ADMIN — ALBUTEROL SULFATE 2 PUFF: 90 AEROSOL, METERED RESPIRATORY (INHALATION) at 01:01

## 2024-01-25 RX ADMIN — ENOXAPARIN SODIUM 60 MG: 60 INJECTION SUBCUTANEOUS at 09:01

## 2024-01-25 RX ADMIN — ISOSORBIDE MONONITRATE 30 MG: 30 TABLET, EXTENDED RELEASE ORAL at 09:01

## 2024-01-25 RX ADMIN — THERA TABS 1 TABLET: TAB at 09:01

## 2024-01-25 RX ADMIN — OXYCODONE HYDROCHLORIDE AND ACETAMINOPHEN 500 MG: 500 TABLET ORAL at 09:01

## 2024-01-25 RX ADMIN — ENOXAPARIN SODIUM 60 MG: 60 INJECTION SUBCUTANEOUS at 08:01

## 2024-01-25 RX ADMIN — ALBUTEROL SULFATE 2 PUFF: 90 AEROSOL, METERED RESPIRATORY (INHALATION) at 08:01

## 2024-01-25 RX ADMIN — REMDESIVIR 100 MG: 100 INJECTION, POWDER, LYOPHILIZED, FOR SOLUTION INTRAVENOUS at 10:01

## 2024-01-25 NOTE — PT/OT/SLP PROGRESS
Physical Therapy Treatment    Patient Name:  Long Albarran   MRN:  [id number]    Recommendations:     Discharge Recommendations: Low Intensity Therapy (with 24/7 SPV and A)  Discharge Equipment Recommendations: walker, rolling, bath bench  Barriers to discharge: None    Assessment:     Long Albarran is a 89 y.o. male admitted with a medical diagnosis of Bradycardia.  He presents with the following impairments/functional limitations: weakness, impaired endurance, impaired functional mobility, gait instability, impaired balance, decreased safety awareness, decreased coordination.    Rehab Prognosis: Good; patient would benefit from acute skilled PT services to address these deficits and reach maximum level of function.    Recent Surgery: * No surgery found *      Plan:     During this hospitalization, patient to be seen 3 x/week to address the identified rehab impairments via gait training, therapeutic activities, therapeutic exercises and progress toward the following goals:    Plan of Care Expires:  02/06/24    Subjective     Chief Complaint: Pt is motivated to participate  Patient/Family Comments/goals: none stated  Pain/Comfort:  Pain Rating 1: 0/10      Objective:     Communicated with nurse Claribel AZUL and [facility] chart review prior to session.  Patient found HOB elevated with peripheral IV, telemetry upon PT entry to room.     General Precautions: Standard, fall, airborne, contact, droplet  Orthopedic Precautions: N/A  Braces: N/A  Respiratory Status: Room air     Functional Mobility:  Gait belt applied - Yes  Bed Mobility  Rolling Right: contact guard assistance  Scooting: contact guard assistance  Supine to Sit: contact guard assistance  Sit to Supine: contact guard assistance  Supine Scooting: SBA  Transfers  Sit to Stand: minimum assistance with hand-held assist  Bed to Chair: pt refused at this time  Gait  Patient ambulated 20ft with rolling walker and  holding IV pole .   Patient demonstrates occasional unsteady  "gait.  No c/o dizziness or SOB, no gross LOB  All lines remained intact throughout ambulation trail.  Balance  Sitting: contact guard assistance  Standing: contact guard assistance    AM-PAC 6 CLICK MOBILITY  Turning over in bed (including adjusting bedclothes, sheets and blankets)?: 3  Sitting down on and standing up from a chair with arms (e.g., wheelchair, bedside commode, etc.): 3  Moving from lying on back to sitting on the side of the bed?: 3  Moving to and from a bed to a chair (including a wheelchair)?: 3  Need to walk in hospital room?: 3  Climbing 3-5 steps with a railing?: 1 (NT)  Basic Mobility Total Score: 16       Treatment & Education:  Reviewed role of PT in acute care and POC. Pt tolerated interventions well. Patient performed 1 set(s) of 10 repetitions of the following bed level exercises: ankle pumps, quad sets, and heel slides for bilateral LE; shoulder flexion, bicep curls, hand squeezes for B UE. Patient required skilled PT for instruction of exercises and appropriate cues to perform exercises safely and appropriately. Reviewed importance of OOB activities, activity pacing, and HEP (marching/hip flex, hip abd, heel slides/LAQ, quad sets, ankle pumps) in order to maintain/regain strength. Encouraged to sit up in chair for all meals. Reviewed "call don't fall" policy and increased risk of falling due to weakness, instructed to utilize call bell for assistance with all transfers. Pt agreeable to all requests.    Patient left HOB elevated with all lines intact, call button in reach, and bed alarm on..    GOALS:   Multidisciplinary Problems       Physical Therapy Goals          Problem: Physical Therapy    Goal Priority Disciplines Outcome Goal Variances Interventions   Physical Therapy Goal     PT, PT/OT Ongoing, Progressing     Description: LTG'S TO BE MET IN 14 DAYS (2-6-24)  PT WILL REQUIRE SPV FOR BED MOBILITY  PT WILL REQUIRE SBA FOR BED<>CHAIR TF'S  PT WILL  FEET WITH OR WITHOUT " APPROPRIATE AD WITH SBA  PT WILL INC AMPAC SCORE BY 2 POINTS TO PROGRESS GROSS FUNC MOBILITY                         Time Tracking:     PT Received On: 01/25/24  PT Start Time: 1011     PT Stop Time: 1036  PT Total Time (min): 25 min     Billable Minutes: Therapeutic Activity 15min and Therapeutic Exercise 10min    Treatment Type: Treatment  PT/PTA: PT     Number of PTA visits since last PT visit: 0     01/25/2024

## 2024-01-25 NOTE — PLAN OF CARE
Pt tolerated interventions well. Required CGA for bed mobility, ambulated 20ft CGA holding onto IV pole. Recommending low intensity therapy with 24/7 SPV and A upon d/c.

## 2024-01-25 NOTE — ASSESSMENT & PLAN NOTE
- Cardiology consulted in in the ER due to bradycardia and elevated tropoinin   - Dr. Molina recommended observation  - tele monitoring    Intermittent episodes of bradycardia  Family reports syncope and collapse at home  Cardiology added imdur  Follow up outpatient for bradycardia workup

## 2024-01-25 NOTE — SUBJECTIVE & OBJECTIVE
Interval History: See hospital course for today      Review of Systems   Constitutional:  Positive for activity change (improved) and fatigue. Negative for appetite change.   Respiratory:  Positive for shortness of breath (improved).    Gastrointestinal:  Negative for abdominal pain, nausea and vomiting.   Neurological:  Positive for weakness.   Psychiatric/Behavioral:  Negative for agitation, behavioral problems, confusion, decreased concentration and dysphoric mood. The patient is not nervous/anxious.      Objective:     Vital Signs (Most Recent):  Temp: 98.2 °F (36.8 °C) (01/25/24 1132)  Pulse: (!) 57 (01/25/24 1500)  Resp: 20 (01/25/24 1355)  BP: 137/62 (01/25/24 1132)  SpO2: 97 % (01/25/24 1500) Vital Signs (24h Range):  Temp:  [97.3 °F (36.3 °C)-98.5 °F (36.9 °C)] 98.2 °F (36.8 °C)  Pulse:  [39-92] 57  Resp:  [16-20] 20  SpO2:  [95 %-97 %] 97 %  BP: (135-169)/(62-88) 137/62     Weight: 58.9 kg (129 lb 13.6 oz)  Body mass index is 23 kg/m².  No intake or output data in the 24 hours ending 01/25/24 1704      Physical Exam  Vitals and nursing note reviewed. Exam conducted with a chaperone present.   Constitutional:       General: He is not in acute distress.     Appearance: He is ill-appearing. He is not toxic-appearing.   HENT:      Head: Normocephalic and atraumatic.   Cardiovascular:      Rate and Rhythm: Bradycardia present.   Pulmonary:      Effort: Pulmonary effort is normal. No respiratory distress.      Breath sounds: Wheezing present.   Abdominal:      Palpations: Abdomen is soft.      Tenderness: There is no abdominal tenderness.   Musculoskeletal:      Right lower leg: No edema.      Left lower leg: No edema.   Skin:     General: Skin is warm.   Neurological:      Mental Status: He is alert. Mental status is at baseline.      Motor: Weakness present.   Psychiatric:         Mood and Affect: Mood normal.         Behavior: Behavior normal.             Significant Labs: All pertinent labs within the past  24 hours have been reviewed.  CBC:   Recent Labs   Lab 01/24/24  0431 01/25/24  0409   WBC 3.16* 6.30   HGB 15.7 14.6   HCT 44.2 41.1   * 149*     CMP:   Recent Labs   Lab 01/24/24  0431   *   K 4.0      CO2 21*   *   BUN 28   CREATININE 0.9   CALCIUM 9.0   PROT 7.8   ALBUMIN 3.3*   BILITOT 0.8   ALKPHOS 68   AST 37   ALT 27   ANIONGAP 7*       Significant Imaging: I have reviewed all pertinent imaging results/findings within the past 24 hours.  EKG: I have reviewed all pertinent results/findings within the past 24 hours and my personal findings are: afib

## 2024-01-25 NOTE — ASSESSMENT & PLAN NOTE
Patient with Paroxysmal (<7 days) atrial fibrillation which is controlled currently with  no meds 2/2 bradycardia . Patient is currently in atrial fibrillation.[id number] Score: 2. Anticoagulation not indicated due to age and risk of falls .  Repeat electrocardiography with afib slow ventricular response

## 2024-01-25 NOTE — SUBJECTIVE & OBJECTIVE
Interval History:     Review of Systems   Constitutional: Negative. Negative for weight gain.   HENT: Negative.     Eyes: Negative.    Cardiovascular: Negative.  Negative for chest pain, leg swelling and palpitations.   Respiratory: Negative.  Negative for shortness of breath.    Endocrine: Negative.    Hematologic/Lymphatic: Negative.    Skin: Negative.    Musculoskeletal:  Negative for muscle weakness.   Gastrointestinal: Negative.    Genitourinary: Negative.    Neurological: Negative.  Negative for dizziness.   Psychiatric/Behavioral: Negative.     Allergic/Immunologic: Negative.    All other systems reviewed and are negative.    Objective:     Vital Signs (Most Recent):  Temp: 97.7 °F (36.5 °C) (01/25/24 0750)  Pulse: 60 (01/25/24 0847)  Resp: 18 (01/25/24 0847)  BP: (Abnormal) 169/74 (01/25/24 0750)  SpO2: 96 % (01/25/24 0847) Vital Signs (24h Range):  Temp:  [97.3 °F (36.3 °C)-98.5 °F (36.9 °C)] 97.7 °F (36.5 °C)  Pulse:  [39-75] 60  Resp:  [16-20] 18  SpO2:  [94 %-97 %] 96 %  BP: (135-169)/(58-88) 169/74     Weight: 58.9 kg (129 lb 13.6 oz)  Body mass index is 23 kg/m².     SpO2: 96 %       No intake or output data in the 24 hours ending 01/25/24 0941    Lines/Drains/Airways       Peripheral Intravenous Line       Name Duration         Peripheral IV - Single Lumen 01/21/24 0314 Distal;Posterior;Right Forearm 4 days         Peripheral IV - Single Lumen 01/21/24 0315 22 G Left;Posterior Forearm 4 days                       Physical Exam       Significant Labs: All pertinent lab results from the last 24 hours have been reviewed.    Significant Imaging: X-Ray: CXR: X-Ray Chest 1 View (CXR): No results found for this visit on 01/21/24.

## 2024-01-25 NOTE — PROGRESS NOTES
[facility] - Telemetry ([facility])  [facility] Medicine  Progress Note    Patient Name: Long Albarran  MRN: [id number]  Patient Class: IP- Inpatient   Admission Date: 1/21/2024  Length of Stay: 2 days  Attending Physician: Betzy Bush MD  Primary Care Provider: Penny Watson DO        Subjective:     Principal Problem:Bradycardia        HPI:  Mr. Hines is a 89 yo [place] male with hx of Afib, CHF with diastolic dysfunction and hypertriglyceridemia presented with shortness of breathe and confusion per ER records.  In the ER, he was found to have elevated troponin x 2, bradycardia, and Covid 19+.  No family was at bedside and [facility] did not have a  available for [employer] therefore Dr. Brooke Carrillo (ER MD) assisted in translating.  Patient stated he was having shortness of breathe, but denied any chest pain.  He has left shoulder pain but could not say how long that has been going on.  He is also blind.  Patient is not requiring NC o2 and chest xray showed no acute finding.  Dr. Molina was consulted for bradycardia and elevated troponin.  He recommended that he be admitted for observations.  Of note, Dr. Cross's note from 11/9/23 did note patient has bradycardia.  He is not on BB or anticoagulation for afib.  Hospital medicine will admit for observation.    Overview/Hospital Course:  1/22 admitted for bradycardia and confusion. Cardiology recommending treatment for covid and outpatient event cardiac monitor. Complains of fatigue, dyspnea, poor appetite. Continue covid treatment protocol. Physical/occupational therapy consulted  1/23 notes improvement in breathing and fatigue. Does not qualify for home oxygen based on ambulatory oxygen evaluation. Physical/occupational therapy recommending homehealth physical/occupational therapy. Anticipate discharge 1-2 days. Bradycardia improved.  1/24 bradycardia persists. Hypoxia improved, not requiring supplemental oxygen. Electrocardiography pending.  1/25 cardiology added  imdur for reflex tachycardia. Completes covid treatment tomorrow. Endorses improvement in dyspnea symptoms but complains of fatigue.    Interval History: See hospital course for today      Review of Systems   Constitutional:  Positive for activity change (improved) and fatigue. Negative for appetite change.   Respiratory:  Positive for shortness of breath (improved).    Gastrointestinal:  Negative for abdominal pain, nausea and vomiting.   Neurological:  Positive for weakness.   Psychiatric/Behavioral:  Negative for agitation, behavioral problems, confusion, decreased concentration and dysphoric mood. The patient is not nervous/anxious.      Objective:     Vital Signs (Most Recent):  Temp: 98.2 °F (36.8 °C) (01/25/24 1132)  Pulse: (!) 57 (01/25/24 1500)  Resp: 20 (01/25/24 1355)  BP: 137/62 (01/25/24 1132)  SpO2: 97 % (01/25/24 1500) Vital Signs (24h Range):  Temp:  [97.3 °F (36.3 °C)-98.5 °F (36.9 °C)] 98.2 °F (36.8 °C)  Pulse:  [39-92] 57  Resp:  [16-20] 20  SpO2:  [95 %-97 %] 97 %  BP: (135-169)/(62-88) 137/62     Weight: 58.9 kg (129 lb 13.6 oz)  Body mass index is 23 kg/m².  No intake or output data in the 24 hours ending 01/25/24 1704      Physical Exam  Vitals and nursing note reviewed. Exam conducted with a chaperone present.   Constitutional:       General: He is not in acute distress.     Appearance: He is ill-appearing. He is not toxic-appearing.   HENT:      Head: Normocephalic and atraumatic.   Cardiovascular:      Rate and Rhythm: Bradycardia present.   Pulmonary:      Effort: Pulmonary effort is normal. No respiratory distress.      Breath sounds: Wheezing present.   Abdominal:      Palpations: Abdomen is soft.      Tenderness: There is no abdominal tenderness.   Musculoskeletal:      Right lower leg: No edema.      Left lower leg: No edema.   Skin:     General: Skin is warm.   Neurological:      Mental Status: He is alert. Mental status is at baseline.      Motor: Weakness present.   Psychiatric:          Mood and Affect: Mood normal.         Behavior: Behavior normal.             Significant Labs: All pertinent labs within the past 24 hours have been reviewed.  CBC:   Recent Labs   Lab 01/24/24  0431 01/25/24  0409   WBC 3.16* 6.30   HGB 15.7 14.6   HCT 44.2 41.1   * 149*     CMP:   Recent Labs   Lab 01/24/24  0431   *   K 4.0      CO2 21*   *   BUN 28   CREATININE 0.9   CALCIUM 9.0   PROT 7.8   ALBUMIN 3.3*   BILITOT 0.8   ALKPHOS 68   AST 37   ALT 27   ANIONGAP 7*       Significant Imaging: I have reviewed all pertinent imaging results/findings within the past 24 hours.  EKG: I have reviewed all pertinent results/findings within the past 24 hours and my personal findings are: afib    Assessment/Plan:      * Bradycardia  - Cardiology consulted in in the ER due to bradycardia and elevated tropoinin   - Dr. Molina recommended observation  - tele monitoring    Intermittent episodes of bradycardia  Family reports syncope and collapse at home  Cardiology added imdur  Follow up outpatient for bradycardia workup    COVID-19  - supportive management  - CXR negative  - On RA sating in the high 90s  - monitor for fevers    Improving, on room air  Covid treatment protocol with dexamethasone, inhaler, remdesivir     Chronic diastolic congestive heart failure  Patient is identified as having Diastolic (HFpEF) heart failure that is Chronic. CHF is currently controlled. Latest ECHO performed and demonstrates- Results for orders placed during the hospital encounter of 02/17/22    Echo    Interpretation Summary  · The left ventricle is normal in size with moderate concentric hypertrophy and normal systolic function.  · The estimated ejection fraction is 55%.  · Indeterminate left ventricular diastolic function.  · Normal right ventricular size with normal right ventricular systolic function.  · Severe left atrial enlargement.  · Mild tricuspid regurgitation.  · Normal central venous pressure (3  mmHg).  · The estimated PA systolic pressure is 42 mmHg.  · There is pulmonary hypertension.  . Continue Furosemide and monitor clinical status closely. Monitor on telemetry. Patient is off CHF pathway.  Monitor strict Is&Os and daily weights.  Place on fluid restriction of 1.5 L. Cardiology has been consulted. Continue to stress to patient importance of self efficacy and  on diet for CHF. Last BNP reviewed- and noted below   Recent Labs   Lab 01/21/24  0626   *       - continue lasix 20mg PO daily (home meds)    Atrial fibrillation  Patient with Paroxysmal (<7 days) atrial fibrillation which is controlled currently with  no meds 2/2 bradycardia . Patient is currently in atrial fibrillation.[id number] Score: 2. Anticoagulation not indicated due to age and risk of falls .  Repeat electrocardiography with afib slow ventricular response      VTE Risk Mitigation (From admission, onward)           Ordered     enoxaparin injection 60 mg  2 times daily         01/22/24 1551     IP VTE HIGH RISK PATIENT  Once         01/21/24 1047     Place sequential compression device  Until discontinued         01/21/24 1047                 service used to facilitate exam  [facility] # [id number]    Discharge Planning   MARLY: 1/22/2024     Code Status: Full Code   Is the patient medically ready for discharge?:     Reason for patient still in hospital (select all that apply): Patient trending condition, Laboratory test, Treatment, Consult recommendations, and Pending disposition  Discharge Plan A: Home with family   Discharge Delays: None known at this time              Betzy Bush MD  Department of Hospital Medicine   [facility] - Telemetry ([facility])

## 2024-01-26 VITALS
TEMPERATURE: 98 F | HEIGHT: 63 IN | SYSTOLIC BLOOD PRESSURE: 139 MMHG | RESPIRATION RATE: 20 BRPM | OXYGEN SATURATION: 96 % | DIASTOLIC BLOOD PRESSURE: 68 MMHG | WEIGHT: 130.06 LBS | HEART RATE: 57 BPM | BODY MASS INDEX: 23.04 KG/M2

## 2024-01-26 DIAGNOSIS — U07.1 COVID-19 VIRUS DETECTED: ICD-10-CM

## 2024-01-26 PROCEDURE — 25000003 PHARM REV CODE 250: Mod: HCNC | Performed by: INTERNAL MEDICINE

## 2024-01-26 PROCEDURE — 63600175 PHARM REV CODE 636 W HCPCS: Mod: JZ,TB,HCNC | Performed by: FAMILY MEDICINE

## 2024-01-26 PROCEDURE — 25000003 PHARM REV CODE 250: Mod: HCNC | Performed by: FAMILY MEDICINE

## 2024-01-26 PROCEDURE — 94761 N-INVAS EAR/PLS OXIMETRY MLT: CPT | Mod: HCNC

## 2024-01-26 PROCEDURE — 94640 AIRWAY INHALATION TREATMENT: CPT | Mod: HCNC

## 2024-01-26 PROCEDURE — 97116 GAIT TRAINING THERAPY: CPT | Mod: HCNC

## 2024-01-26 PROCEDURE — 97530 THERAPEUTIC ACTIVITIES: CPT | Mod: HCNC

## 2024-01-26 RX ORDER — ISOSORBIDE MONONITRATE 30 MG/1
30 TABLET, EXTENDED RELEASE ORAL DAILY
Qty: 30 TABLET | Refills: 0 | Status: SHIPPED | OUTPATIENT
Start: 2024-01-26 | End: 2024-05-20

## 2024-01-26 RX ORDER — DEXAMETHASONE 6 MG/1
6 TABLET ORAL DAILY
Qty: 6 TABLET | Refills: 0 | Status: SHIPPED | OUTPATIENT
Start: 2024-01-26 | End: 2024-02-01

## 2024-01-26 RX ORDER — FUROSEMIDE 20 MG/1
20 TABLET ORAL DAILY
Qty: 30 TABLET | Refills: 0 | Status: SHIPPED | OUTPATIENT
Start: 2024-01-26 | End: 2024-02-06 | Stop reason: SDUPTHER

## 2024-01-26 RX ADMIN — POLYETHYLENE GLYCOL 3350 17 G: 17 POWDER, FOR SOLUTION ORAL at 09:01

## 2024-01-26 RX ADMIN — REMDESIVIR 100 MG: 100 INJECTION, POWDER, LYOPHILIZED, FOR SOLUTION INTRAVENOUS at 09:01

## 2024-01-26 RX ADMIN — DEXAMETHASONE 6 MG: 4 TABLET ORAL at 09:01

## 2024-01-26 RX ADMIN — FUROSEMIDE 20 MG: 20 TABLET ORAL at 09:01

## 2024-01-26 RX ADMIN — ALBUTEROL SULFATE 2 PUFF: 90 AEROSOL, METERED RESPIRATORY (INHALATION) at 08:01

## 2024-01-26 RX ADMIN — OXYCODONE HYDROCHLORIDE AND ACETAMINOPHEN 500 MG: 500 TABLET ORAL at 09:01

## 2024-01-26 RX ADMIN — ISOSORBIDE MONONITRATE 30 MG: 30 TABLET, EXTENDED RELEASE ORAL at 09:01

## 2024-01-26 RX ADMIN — ENOXAPARIN SODIUM 60 MG: 60 INJECTION SUBCUTANEOUS at 09:01

## 2024-01-26 RX ADMIN — THERA TABS 1 TABLET: TAB at 09:01

## 2024-01-26 RX ADMIN — ASPIRIN 81 MG CHEWABLE TABLET 81 MG: 81 TABLET CHEWABLE at 09:01

## 2024-01-26 NOTE — PT/OT/SLP PROGRESS
Physical Therapy Treatment    Patient Name:  Long Albarran   MRN:  [id number]    Recommendations:     Discharge Recommendations: Low Intensity Therapy (with 24/7 care)  Discharge Equipment Recommendations: walker, rolling, bath bench  Barriers to discharge: None    Assessment:     Long Albarran is a 89 y.o. male admitted with a medical diagnosis of Bradycardia.  He presents with the following impairments/functional limitations: weakness, impaired endurance, impaired functional mobility, gait instability, impaired balance, decreased safety awareness, decreased lower extremity function.    Rehab Prognosis: Good; patient would benefit from acute skilled PT services to address these deficits and reach maximum level of function.    Recent Surgery: * No surgery found *      Plan:     During this hospitalization, patient to be seen 3 x/week to address the identified rehab impairments via gait training, therapeutic activities, therapeutic exercises and progress toward the following goals:    Plan of Care Expires:  02/06/24    Subjective     Chief Complaint: Pt is motivated to participate  Patient/Family Comments/goals: none stated  Pain/Comfort:  Pain Rating 1: 0/10      Objective:     Communicated with nurse Hodgson and epic chart review prior to session.  Patient found sitting edge of bed with  (none, pt awaiting d/c) upon PT entry to room.     General Precautions: Standard, airborne, contact, droplet, fall  Orthopedic Precautions: N/A  Braces: N/A  Respiratory Status: Room air     Functional Mobility:  Gait belt applied - Yes  Bed Mobility  Seated EOB at start of session and returned to chair  Transfers  Sit to Stand: supervision with no AD  Gait  Patient ambulated 120ft with no AD and supervision.   Patient demonstrates steady gait.  No c/o dizziness or SOB  All lines remained intact throughout ambulation trail.  Balance  Sitting: supervision  Standing: supervision    AM-PAC 6 CLICK MOBILITY  Turning over in bed  "(including adjusting bedclothes, sheets and blankets)?: 1 (NT)  Sitting down on and standing up from a chair with arms (e.g., wheelchair, bedside commode, etc.): 4  Moving from lying on back to sitting on the side of the bed?: 1 (NT)  Moving to and from a bed to a chair (including a wheelchair)?: 4  Need to walk in hospital room?: 4  Climbing 3-5 steps with a railing?: 1 (NT)  Basic Mobility Total Score: 15       Treatment & Education:  Reviewed role of PT in acute care and POC. Pt tolerated interventions well. Patient performed 1 set(s) of 5 repetitions of the following seated exercises: ankle pumps, long arc quads, and marches for bilateral LE. Patient required skilled PT for instruction of exercises and appropriate cues to perform exercises safely and appropriately. Reviewed importance of OOB activities, activity pacing, and HEP (marching/hip flex, hip abd, heel slides/LAQ, quad sets, ankle pumps) in order to maintain/regain strength. Encouraged to sit up in chair for all meals. Reviewed proper use of RW for safety and to reduce risk of falling. Reviewed "call don't fall" policy and increased risk of falling due to weakness, instructed to utilize call bell for assistance with all transfers. Pt agreeable to all requests.    Patient left sitting edge of bed with all lines intact, call button in reach, nurse notified, and family present..    GOALS:   Multidisciplinary Problems       Physical Therapy Goals          Problem: Physical Therapy    Goal Priority Disciplines Outcome Goal Variances Interventions   Physical Therapy Goal     PT, PT/OT Ongoing, Progressing     Description: LTG'S TO BE MET IN 14 DAYS (2-6-24)  PT WILL REQUIRE SPV FOR BED MOBILITY  PT WILL REQUIRE SBA FOR BED<>CHAIR TF'S  PT WILL  FEET WITH OR WITHOUT APPROPRIATE AD WITH SBA  PT WILL INC AMPAC SCORE BY 2 POINTS TO PROGRESS GROSS FUNC MOBILITY                         Time Tracking:     PT Received On: 01/26/24  PT Start Time: 1140     PT " Stop Time: 1155  PT Total Time (min): 15 min     Billable Minutes: Gait Training 15min    Treatment Type: Treatment  PT/PTA: PT     Number of PTA visits since last PT visit: 0     01/26/2024

## 2024-01-26 NOTE — PT/OT/SLP PROGRESS
Occupational Therapy   Treatment    Name: Long Albarran  MRN: [id number]  Admitting Diagnosis:  Bradycardia       Recommendations:     Discharge Recommendations: Low Intensity Therapy (with 24/7 assist)  Discharge Equipment Recommendations:  walker, rolling, bath bench  Barriers to discharge:  None    Assessment:     Long Albarran is a 89 y.o. male with a medical diagnosis of Bradycardia.  He presents with the following performance deficits affecting function are weakness, impaired endurance, impaired self care skills, impaired functional mobility, gait instability, impaired balance, decreased safety awareness, impaired cardiopulmonary response to activity.     Rehab Prognosis:  Good; patient would benefit from acute skilled OT services to address these deficits and reach maximum level of function.       Plan:     Patient to be seen 2 x/week to address the above listed problems via self-care/home management, therapeutic activities, therapeutic exercises  Plan of Care Expires: 02/06/24  Plan of Care Reviewed with: patient    Subjective     Chief Complaint: none reported  Patient/Family Comments/goals: ready to go home  Pain/Comfort:  Pain Rating 1: 0/10    Objective:     Communicated with: Nurse and epic chart review prior to session.  Patient found sitting edge of bed with Other (comments) (NONE) upon OT entry to room. Pt preparing for d/c from hospital.    General Precautions: Standard, fall, airborne, contact, droplet    Orthopedic Precautions:N/A  Braces: N/A  Respiratory Status: Room air     Occupational Performance:     Bed Mobility:    Patient completed Scooting/Bridging with independence     Functional Mobility/Transfers:  Patient completed Sit <> Stand Transfer with supervision  with  no assistive device   Functional Mobility: Patient completed x120ft functional mobility with SPV and no AD to increase dynamic standing balance and activity tolerance needed for ADL completion.   Stand pivot t/f to EOB with SPV  and no AD.    [facility] 6 Click ADL: 20    Treatment & Education:  Reviewed BUE AROM therex, pt demonstrated understanding by performing x5 reps:  Shoulder flexion  Horizontal abduction/adduction  Elbow flexion/ext  Digit flexion/ext  Educated on techniques to use to increase independence and decrease fall risk with functional transfers. Educated on importance of OOB activity and calling for A to meet needs. Encouraged completion of B UE AROM therex throughout the day to tolerance to increase functional strength and activity tolerance. Educated patient on importance of increased tolerance to upright position and direct impact on CV endurance and strength. Patient encouraged to sit up in chair for a minimum of 2 consecutive hours per day. Patient stated understanding and in agreement with POC.     Patient left sitting edge of bed with call button in reach and family member present    GOALS:   Multidisciplinary Problems       Occupational Therapy Goals          Problem: Occupational Therapy    Goal Priority Disciplines Outcome Interventions   Occupational Therapy Goal     OT, PT/OT Ongoing, Progressing    Description: Goals to be met by: 2/6/24     Patient will increase functional independence with ADLs by performing:    Toileting from toilet with Contact Guard Assistance for hygiene and clothing management.   Toilet transfer to toilet with Contact Guard Assistance.  Increased functional strength in B UE grossly by 1/2 MM grade.                         Time Tracking:     OT Date of Treatment: 01/26/24  OT Start Time: 1140  OT Stop Time: 1155  OT Total Time (min): 15 min    Billable Minutes:Therapeutic Activity 15    OT/MELINDA: OT      Dasha Lucero, OT     1/26/2024

## 2024-01-26 NOTE — PLAN OF CARE
01/26/24 0950   Medicare Message   Important Message from Medicare regarding Discharge Appeal Rights Explained to patient/caregiver   Date IMM was signed 01/26/24   Time IMM was signed 0940       Completed IMM over the phone with Patient's daughter, Marino Hines.  Daughter verbalized understanding.

## 2024-01-26 NOTE — PLAN OF CARE
[facility] - Telemetry (Hospital)  Discharge Final Note    Primary Care Provider: Penny Watson DO    Expected Discharge Date: 1/26/2024    Final Discharge Note (most recent)       Final Note - 01/26/24 0950          Final Note    Assessment Type Final Discharge Note     Anticipated Discharge Disposition Home-Health Care [facility]     Hospital Resources/Appts/Education Provided Appointments scheduled and added to AVS        Post-Acute Status    Post-Acute Authorization Home Health     Home Health Status Set-up Complete/Auth obtained     Discharge Delays None known at this time                     Important Message from Medicare  Important Message from Medicare regarding Discharge Appeal Rights: Explained to patient/caregiver     Date IMM was signed: 01/26/24  Time IMM was signed: 0940    Contact Info       [facility]   Specialty: Home Health Services    [address] [address]   Phone: [phone]       Next Steps: Call in 3 day(s)    Instructions: home health  [facility] has been set up for you upon discharge. If no one has reached out within 3 days of discharge, please give them a call.          DC Disposition: [facility]   Family Notified: Patient's daughter, Marino, over the phone  Transportation: personal transportation, Patient's family     Patient needed Home Health services set up upon discharge. Patient has [facility] set up.     Patient has PCP hospital follow up with Penny Watson DO, on 1/31/24 at 10:20 am.

## 2024-01-26 NOTE — DISCHARGE SUMMARY
[facility] - Telemetry ([facility])  [facility] Medicine  Discharge Summary      Patient Name: Long Albarran  MRN: [id number]  LAKSHMI: [id number]  Patient Class: IP- Inpatient  Admission Date: 1/21/2024  Hospital Length of Stay: 3 days  Discharge Date and Time:  01/26/2024 1:25 PM  Attending Physician: No att. providers found   Discharging Provider: Betzy Bush MD  Primary Care Provider: Penny Watson DO    Primary Care Team: [facility] MEDICINE D    HPI:   Mr. Hines is a 89 yo [place] male with hx of Afib, CHF with diastolic dysfunction and hypertriglyceridemia presented with shortness of breathe and confusion per ER records.  In the ER, he was found to have elevated troponin x 2, bradycardia, and Covid 19+.  No family was at bedside and YAYA did not have a  available for [employer] therefore Dr. Brooke Carrillo (ER MD) assisted in translating.  Patient stated he was having shortness of breathe, but denied any chest pain.  He has left shoulder pain but could not say how long that has been going on.  He is also blind.  Patient is not requiring NC o2 and chest xray showed no acute finding.  Dr. Molina was consulted for bradycardia and elevated troponin.  He recommended that he be admitted for observations.  Of note, Dr. Cross's note from 11/9/23 did note patient has bradycardia.  He is not on BB or anticoagulation for afib.  Hospital medicine will admit for observation.    * No surgery found *      Hospital Course:   1/22 admitted for bradycardia and confusion. Cardiology recommending treatment for covid and outpatient event cardiac monitor. Complains of fatigue, dyspnea, poor appetite. Continue covid treatment protocol. Physical/occupational therapy consulted  1/23 notes improvement in breathing and fatigue. Does not qualify for home oxygen based on ambulatory oxygen evaluation. Physical/occupational therapy recommending homehealth physical/occupational therapy. Anticipate discharge 1-2 days. Bradycardia  improved.  1/24 bradycardia persists. Hypoxia improved, not requiring supplemental oxygen. Electrocardiography pending.  1/25 cardiology added imdur for reflex tachycardia. Completes covid treatment tomorrow. Endorses improvement in dyspnea symptoms but complains of fatigue.    1/26   Did not evaluate patient prior to discharge. Prescription for dexamethasone and imdur delivered to bedside    Patient seen and evaluated by me. Patient was determined to be suitable for discharge. Patient deemed stable for discharge to home with homehealth physical/occupational therapy and nurse practitioner to visit home program.         Goals of Care Treatment Preferences:  Code Status: Full Code      Consults:   Consults (From admission, onward)          Status Ordering Provider     Inpatient consult to Social Work/Case Management  Once        Provider:  (Not yet assigned)    Completed KEVIN BUSBY     Inpatient consult to Cardiology  Once        Provider:  Niyah Molina MD    Completed NED WILLINGHAM     Inpatient consult to Cardiology  Once        Provider:  Niyah Molina MD    Completed RADHA HARVEY.            No new Assessment & Plan notes have been filed under this hospital service since the last note was generated.  Service: Hospital Medicine    Final Active Diagnoses:    Diagnosis Date Noted POA    PRINCIPAL PROBLEM:  Bradycardia [R00.1] 01/26/2022 Yes    COVID-19 [U07.1] 01/21/2024 Yes    Chronic diastolic congestive heart failure [I50.32] 05/08/2023 Yes    Atrial fibrillation [I48.91] 01/26/2022 Yes      Problems Resolved During this Admission:       Discharged Condition: stable    Disposition: Home or Self Care    Follow Up:   [place] [facility] 3 day(s).    Specialty: Home Health Services  Why: [facility] has been set up for you upon discharge. If no one has reached out within 3 days of discharge, please give them a call.  Contact information:  [address]  [place] [address]  [phone]                           Patient Instructions:      Ambulatory referral/consult to Outpatient Case Management   Referral Priority: Routine Referral Type: Consultation   Referral Reason: Specialty Services Required   Number of Visits Requested: 1     Ambulatory referral/consult to Home Health   Standing Status: Future   Referral Priority: Routine Referral Type: Home Health   Referral Reason: Specialty Services Required   Requested Specialty: Home Health Services   Number of Visits Requested: 1     Ambulatory referral/consult to Cardiology   Standing Status: Future   Referral Priority: Routine Referral Type: Consultation   Referral Reason: Specialty Services Required   Referred to Provider: ANAYA ELIAS Requested Specialty: Cardiology   Number of Visits Requested: 1       Significant Diagnostic Studies: Labs: CBC   Recent Labs   Lab 01/25/24  0409   WBC 6.30   HGB 14.6   HCT 41.1   *   , Troponin   Recent Labs   Lab 01/21/24  0626 01/21/24  1421 01/22/24  1626   TROPONINI 0.083* 0.068* 0.075*   ,  and All labs within the past 24 hours have been reviewed  Radiology: X-Ray: CXR: portable and shoulder left  CT scan:  CT head wo contrast    Pending Diagnostic Studies:       Procedure Component Value Units Date/Time    D-Dimer, Quantitative [[id number]]     Order Status: Sent Lab Status: No result     Specimen: Blood     Ferritin [[id number]]     Order Status: Sent Lab Status: No result     Specimen: Blood     Lactate Dehydrogenase [[id number]]     Order Status: Sent Lab Status: No result     Specimen: Blood            Medications:  Reconciled Home Medications:      Medication List        START taking these medications      dexAMETHasone 6 MG tablet  Commonly known as: DECADRON  Take 1 tablet (6 mg total) by mouth once daily. for 6 days     furosemide 20 MG tablet  Commonly known as: LASIX  Take 1 tablet (20 mg total) by mouth once daily.      isosorbide mononitrate 30 MG 24 hr tablet  Commonly known as: IMDUR  Take 1 tablet (30 mg total) by mouth once daily.            CONTINUE taking these medications      albuterol 0.63 mg/3 mL Nebu  Commonly known as: ACCUNEB  Take 3 mLs (0.63 mg total) by nebulization every 6 (six) hours as needed (wheezing or shortness of breath).     FLUAD QUAD 2023-24(65Y UP)(PF) 60 mcg (15 mcg x 4)/0.5 mL Syrg  Generic drug: flu vac 2023 [id number]-[id number](PF)     meloxicam 15 MG tablet  Commonly known as: MOBIC  Take 1 tablet (15 mg total) by mouth daily as needed for Pain.              Indwelling Lines/Drains at time of discharge:   Lines/Drains/Airways       None                   Time spent on the discharge of patient: 29 minutes         Betzy Bush MD  Department of Hospital Medicine  [facility] - Telemetry ([facility])

## 2024-01-26 NOTE — DISCHARGE INSTRUCTIONS
You have been started on new medication(s) from this hospitalization. Please take as directed and schedule hospital follow up appointments with your primary providers.    A nurse practitioner may be contacting you to assess your status post-hospitalization.     Homehealth with physical/occupational therapy order has been ordered. Someone will be in contact.

## 2024-01-26 NOTE — PLAN OF CARE
Pt tolerated interventions well. Required SPV for STS, ambulated 120ft SPV, no AD. Recommending low intensity therapy upon d/c.

## 2024-01-26 NOTE — CONSULTS
SW attempted to contact patient's daughter, Marino Hines, to discuss DC and set up of Home Health. Patient's daughter did not answer, however SW was able to leave  with call back number. SW will send referrals to in-network  agencies for accepting agencies.     SW will continue to follow and assist as needed.

## 2024-01-29 ENCOUNTER — PATIENT OUTREACH (OUTPATIENT)
Dept: ADMINISTRATIVE | Facility: HOSPITAL | Age: 89
End: 2024-01-29
Payer: MEDICARE

## 2024-01-29 NOTE — PROGRESS NOTES
Called patient to confirm hospital follow up scheduled on 1/31/2024.   Patient is confirmed. Encouraged patient to bring all medications and BP cuff to follow up visit.     Spoke with Ms Hines (daughter) to confirm appt. [facility] does not have a bp machine.

## 2024-01-29 NOTE — PROGRESS NOTES
"LPN Care Coordination Encounter Details:    Outreach Performed: NO chart documentation       Recent ED and/or IP Discharges:    Last PCP Visit Date: 1/11/2024          [x]  Reviewed recent ED & Inpatient Discharges to ensure appropriate           follow up appointments are scheduled         Additional Notes:  Patient has appointment scheduled with PCP on 1/31/24 for hospital follow up.           Provider Team Continuity:        [x]  Reviewed Primary Care Provider Visits, Annual Wellness Visit, and Future          Appointments to ensure appointments have been scheduled and/or           completed        Additional Notes:  Last Primary care appt was 1/11/24, next annual exam with PCP is 6/6/24           MyChart Portal Status:         [x]  Reviewed MyChart Portal Status offered / enrolled if applicable        Additional Notes:     MyChart Outcomes: Pt is enrolled & active            Health Maintenance Screening(s) Due:      Additional [facility] Notes:     Patient is not due for any screenings at this time.             Updates Requested / Reviewed:        Care Everywhere, , Care Team Updated, and Immunizations Reconciliation Completed or Queried: [place]         Health Maintenance Topics Overdue:      [facility] Score: 0     Patient is not due for any topics at this time.            Health Maintenance Topic(s) Outreach Outcomes & Actions Taken:    Chronic Disease Management:     Diabetes Measures      No results found for: "LABA1C", "HGBA1C"        [x]  Reviewed chart for active Diabetes diagnosis     []  Scheduled necessary follow up appointments if needed         Additional Notes:  Patient is not diabetic           Hypertension Measures        BP Readings from Last 1 Encounters:   01/26/24 139/68           [x]  Reviewed chart for active Hypertension diagnosis     []  Reviewed & documented Home BP Cuff     []  Documented a Remote BP if needed & applicable     []  Scheduled necessary follow up appointments with " Primary Care if needed         Additional Notes:  Patient does not have a diagnosis of HTN, patient does have an appointment with cardiology on 2/5/24 and PCP on 1/31/24           Social Determinants of Health         []  Reviewed, completed, and/or updated the following sections:                  Food Insecurity, Transportation Needs, Financial Resource Strain,                 Tobacco Use          Care Management, Digital Medicine, and/or Education Referrals    OPCM Risk Score: 45         Next Steps - Referral Actions: no referrals placed        Additional Notes:

## 2024-01-30 ENCOUNTER — PATIENT OUTREACH (OUTPATIENT)
Dept: ADMINISTRATIVE | Facility: CLINIC | Age: 89
End: 2024-01-30
Payer: MEDICARE

## 2024-01-30 ENCOUNTER — OUTPATIENT CASE MANAGEMENT (OUTPATIENT)
Dept: ADMINISTRATIVE | Facility: OTHER | Age: 89
End: 2024-01-30
Payer: MEDICARE

## 2024-01-30 NOTE — PROGRESS NOTES
Outpatient Care Management  Initial Patient Assessment    Patient: Long Albarran  MRN: [id number]  Date of Service: 01/30/2024  Completed by: Araceli Alexandra RN  Referral Date: 01/22/2024  Date of Eligibility: 1/23/2024  Program: High Risk  Status: Ongoing  Effective Dates: 1/30/2024 - present  Responsible Staff: Araceli Alexandra, RN        Reason for Visit   Patient presents with    OPCM Enrollment Call    Nursing Assessment       Brief Summary:  Long Albarran was referred by Dr ANGELA Bush for SOB, Covid + status and bradyarrhythmia. Patient qualifies for program based on risk score of > 60 at time of referral.   Active problem list, medical, surgical and social history reviewed. Active comorbidities include SOB, a fib, bradycardia, CHF, atherosclerosis of aorta, pulm heart disease and recent Covid infection. Areas of need identified by caregiver include education for her about CHF and a fib so she can educate her father. She also requests that this CM order [employer] [facility] meals for him in a low salt diet. She reports Mr Hines has no food allergies.   Next steps: Order [employer] [facility] meals. Mail educational literature in English and Tagalog about a fib and CHF and follow up in one week to begin to discuss disease processes with Marino and answer any questions they may have.    Disability Status  Is the patient alert and oriented (person, place, time, and situation)?: Requires Prompting  Hearing Difficulty or Deaf: yes  Hearing Management: -- (Has some hearing deficits, but no hearing aids.)  Visual Difficulty or Blind: yes  Visual and Hearing Needs Conclusion: -- (Impaired vision. Wears glasses that help, but do not correct completely.)  Vision Management: -- (Glasses.)  Difficulty Concentrating, Remembering or Making Decisions: yes  Concentration Management: -- (Can be forgetful.)  Communication Difficulty: no (Is [place] and speaks Tagalog, so has some language  barriers with HCP's, but is able to  communicate well with family.)  Eating/Swallowing Difficulty: no  Walking or Climbing Stairs Difficulty: yes  Walking or Climbing Stairs: ambulation difficulty, requires equipment  Mobility Management: -- (Uses a cane for all mobility.)  Dressing/Bathing Difficulty: no  Toileting : Independent  Continence : Continence - Not a problem  Difficulty Managing Errands Independently: yes  Errands Management: -- (Family does all errands.)  Equipment Currently Used at Home: cane, straight; nebulizer  ADL Conclusion Statement: -- (Is independent with personal care, daughter assists with meal prep, home maintenance and driving to provider appts.)  Change in Functional Status Since Onset of Current Illness/Injury: no        Spiritual Beliefs  Spiritual, Cultural Beliefs, Rastafarian Practices, Values that Affect Care: no      Social History     Socioeconomic History    Marital status: Single   Tobacco Use    Smoking status: Former     Current packs/day: 0.00     Average packs/day: 1 pack/day for 61.0 years (61.0 ttl pk-yrs)     Types: Cigarettes     Start date:      Quit date:      Years since quittin.0     Passive exposure: Past    Smokeless tobacco: Never   Substance and Sexual Activity    Alcohol use: No     Alcohol/week: 0.0 standard drinks of alcohol    Drug use: No    Sexual activity: Not Currently       Roles and Relationships  Primary Source of Support/Comfort: child(cyrus)  Name of Support/Comfort Primary Source: -- (Marino)  Primary Roles/Responsibilities: caregiver for other(s)  Secondary Source of Support/Comfort: child(cyrus)  Name of Support/Comfort Secondary Source: -- (Kiem)      Advance Directives (For Healthcare)  Advance Directive  (If Adv Dir status is received, view document under Adv Dir in header or Chart Review Media tab): Patient does not have Advance Directive, declines information.        Patient Reported Insurance  Verified current insurance plan:: [employer]age; Medicaid  [employer]  benefits discussed:: OTC Prescription Discounts; [employer]            1/30/2024     1:35 PM 4/19/2023     8:04 AM 7/19/2017    10:00 AM   Depression Patient Health Questionnaire   Over the last two weeks how often have you been bothered by little interest or pleasure in doing things Not at all Not at all Not at all   Over the last two weeks how often have you been bothered by feeling down, depressed or hopeless Not at all Not at all Not at all   PHQ-2 Total Score 0 0 0       Learning Assessment       01/30/2024 1456 [facility] (1/30/2024 - Present)   Created by Araceli Alexandra, RN - RN (Nurse) Status: Complete                 PRIMARY LEARNER     Primary Learner Name:  Marino  - 01/30/2024 1456    Relationship:  Family  - 01/30/2024 1456    Does the primary learner have any barriers to learning?:  Language  - 01/30/2024 1456    What is the preferred language of the primary learner?:  English  - 01/30/2024 1456    Is an  required?:  No  - 01/30/2024 1456    How does the primary learner prefer to learn new concepts?:  Listening  - 01/30/2024 1456    How often do you need to have someone help you read instructions, pamphlets, or written material from your doctor or pharmacy?:  Rarely  - 01/30/2024 1456        CO-LEARNER #1     No question answered        CO-LEARNER #2     No question answered        SPECIAL TOPICS     No question answered        ANSWERED BY:     No question answered        Edit History       Araceli Alexandra, RN - RN (Nurse)   01/30/2024 1456

## 2024-01-30 NOTE — PROGRESS NOTES
C3 nurse spoke with Long Albarran's daughter for a TCC post hospital discharge follow up call. Pt denies any new symptoms with the pt but says his appetite is still decreased. She requested a callback to finish the call around 1100 today.    The patient has a scheduled HOSFU with his PCP, Penny Watson DO at 1/31/24 at 1020. No messages routed at this time.

## 2024-01-30 NOTE — PROGRESS NOTES
C3 nurse spoke with Long Albarran's daughter for a TCC post hospital discharge follow up call. Pts daughter denies any new symptoms with the pt but does state he is still has a decreased appetite and some blurry vision, but the vision is improving. Pts daughter has one of the pts meds that is not listed on his med list- losartan. She verbalized understanding to call the prescribing physician Dr. Bethel Cross to clarify if the pt is to continue taking this medication. If she does not receive a response today she verbalized understanding to bring the medication bottle to the pts followup appointment tomorrow to discuss with Penny Watson DO. She verbalizes understanding to call the OOC number for any new or worsening symptoms.     The patient has a scheduled HOSFU with his PCP, Penny Watson DO at 1/31/24 at 1020. No messages routed at this time.

## 2024-01-30 NOTE — LETTER
Long Hines  [address] [place] [address]      Dear Long Hines,     I work with [facility] Outpatient Care Management Department. We received a referral to call you to discuss your medical history. These services are free of charge and are offered to [facility] patients who have recently been discharged from any of our facilities or who have medical conditions that may require the skill of a nurse to assist with management.     I am a Registered Nurse who specializes in connecting patients with available medical and financial resources as well as addressing any educational needs that may be indicated.    I attempted to reach you by telephone, but I was unsuccessful. Please call our department so that we can go over some questions with you, regarding your health.    The Outpatient Care Management Department can be reached at [phone], from 8:00AM to 4:30 PM, on Monday thru Friday.     Additionally, [facility] also has a program where a nurse is available 24/7 to answer questions or provide medical advice, their number is [phone].      Thanks,        Araceli Alexandra RN  Outpatient Care Management  Phone #: [phone]

## 2024-01-30 NOTE — LETTER
Long Hines  [address]  [place] [address]    Dear Long Hines,     Welcome to [facility] Outpatient Care Management Program. We are here to assist patients with multiple long-term (chronic) conditions who often need more personalized healthcare.    It was a pleasure talking with you today. My name is Araceli Alexandra RN. I look forward to working with you as your Care Manager. I will be contacting you by telephone routinely to help coordinate care and resolve issues.    My goal is to help you function at the healthiest and highest level possible. You can contact me directly at [phone].    As an [facility] patient with [employer], some of the services we provide, at no cost to you, include:     Development of an individualized care plan with a Registered Nurse   Connection with a   Assistance from a Community Health Worker  Connection with available resources and services    Coordinate communication among your care team members   Provide coaching and education  Help you understand your doctor's treatment plan  Help you obtain information about your insurance coverage.    All services provided by [facility] Outpatient Care Managers and other care team members are coordinated with and communicated to your primary care team.      As part of your enrollment, you will be receiving education materials and more information about these services in your [facility] account, by phone, or through the mail. If you do not wish to participate or receive information, you can Opt Out by contacting our office at [phone].      Sincerely,        Araceli Alexandra RN  [facility]   Outpatient Care Management

## 2024-01-31 ENCOUNTER — OFFICE VISIT (OUTPATIENT)
Dept: INTERNAL MEDICINE | Facility: CLINIC | Age: 89
End: 2024-01-31
Payer: MEDICARE

## 2024-01-31 VITALS
OXYGEN SATURATION: 99 % | HEART RATE: 60 BPM | TEMPERATURE: 95 F | DIASTOLIC BLOOD PRESSURE: 84 MMHG | BODY MASS INDEX: 22.46 KG/M2 | RESPIRATION RATE: 18 BRPM | WEIGHT: 126.75 LBS | SYSTOLIC BLOOD PRESSURE: 126 MMHG

## 2024-01-31 DIAGNOSIS — R63.0 DECREASED APPETITE: ICD-10-CM

## 2024-01-31 DIAGNOSIS — R06.02 SHORTNESS OF BREATH: ICD-10-CM

## 2024-01-31 DIAGNOSIS — Z09 HOSPITAL DISCHARGE FOLLOW-UP: Primary | ICD-10-CM

## 2024-01-31 DIAGNOSIS — R63.4 WEIGHT LOSS: ICD-10-CM

## 2024-01-31 PROCEDURE — 99999 PR PBB SHADOW E&M-EST. PATIENT-LVL IV: CPT | Mod: PBBFAC,HCNC,, | Performed by: INTERNAL MEDICINE

## 2024-01-31 PROCEDURE — 1101F PT FALLS ASSESS-DOCD LE1/YR: CPT | Mod: HCNC,CPTII,S$GLB, | Performed by: INTERNAL MEDICINE

## 2024-01-31 PROCEDURE — 1160F RVW MEDS BY RX/DR IN RCRD: CPT | Mod: HCNC,CPTII,S$GLB, | Performed by: INTERNAL MEDICINE

## 2024-01-31 PROCEDURE — 1159F MED LIST DOCD IN RCRD: CPT | Mod: HCNC,CPTII,S$GLB, | Performed by: INTERNAL MEDICINE

## 2024-01-31 PROCEDURE — 99214 OFFICE O/P EST MOD 30 MIN: CPT | Mod: HCNC,S$GLB,, | Performed by: INTERNAL MEDICINE

## 2024-01-31 PROCEDURE — 1111F DSCHRG MED/CURRENT MED MERGE: CPT | Mod: HCNC,CPTII,S$GLB, | Performed by: INTERNAL MEDICINE

## 2024-01-31 PROCEDURE — 1126F AMNT PAIN NOTED NONE PRSNT: CPT | Mod: HCNC,CPTII,S$GLB, | Performed by: INTERNAL MEDICINE

## 2024-01-31 PROCEDURE — 3288F FALL RISK ASSESSMENT DOCD: CPT | Mod: HCNC,CPTII,S$GLB, | Performed by: INTERNAL MEDICINE

## 2024-01-31 RX ORDER — CYPROHEPTADINE HYDROCHLORIDE 4 MG/1
4 TABLET ORAL 2 TIMES DAILY PRN
Qty: 60 TABLET | Refills: 2 | Status: SHIPPED | OUTPATIENT
Start: 2024-01-31

## 2024-01-31 NOTE — PROGRESS NOTES
Long Albarran  89 y.o.      Other male  [id number]    Chief Complaint:  Chief Complaint   Patient presents with    Hospital Follow Up       History of Present Illness: Long Hines is a 89-year-old male with a history of HFpEF, afib, and hypertriglyceridemia who presented for a hospital F/U. He was admitted on 1/21 for dyspnea and confusion after waking up at night confused and screaming. Lab workup was significant for elevated troponin, bradycardia, and COVID-19+. Since discharge, he reports dizziness when walking, loss of appetite, and blurry vision. He has had such symptoms since Nov resulting in a 15-lb weight loss. Had night sweats prior to admission but none since. Also had not had a bowel movement for 3 days. His daughter reports that the patient has only been taking Decadron, Lasix, and Imdur since discharge. Denies pain, recent fall or infection, numbness, and other symptoms.    Laboratory:  Lab Results   Component Value Date    WBC 6.30 01/25/2024    HGB 14.6 01/25/2024    HCT 41.1 01/25/2024     (L) 01/25/2024    CHOL 191 12/13/2023    TRIG 176 (H) 12/13/2023    HDL 39 (L) 12/13/2023    ALT 27 01/24/2024    AST 37 01/24/2024     (L) 01/24/2024    K 4.0 01/24/2024     01/24/2024    CREATININE 0.9 01/24/2024    BUN 28 01/24/2024    CO2 21 (L) 01/24/2024    TSH 0.894 01/21/2024    INR 1.1 01/22/2024       History:  Past Medical History:   Diagnosis Date    A-fib     Diastolic dysfunction 12/2017    Hypertriglyceridemia        Medications:  Current Outpatient Medications on File Prior to Visit   Medication Sig Dispense Refill    albuterol (ACCUNEB) 0.63 mg/3 mL Nebu Take 3 mLs (0.63 mg total) by nebulization every 6 (six) hours as needed (wheezing or shortness of breath). 75 mL 5    dexAMETHasone (DECADRON) 6 MG tablet Take 1 tablet (6 mg total) by mouth once daily. for 6 days 6 tablet 0    FLUAD QUAD 2023-24,65Y UP,,PF, 60 mcg (15 mcg x 4)/0.5 mL Syrg       furosemide (LASIX) 20 MG  tablet Take 1 tablet (20 mg total) by mouth once daily. 30 tablet 0    isosorbide mononitrate (IMDUR) 30 MG 24 hr tablet Take 1 tablet (30 mg total) by mouth once daily. 30 tablet 0    meloxicam (MOBIC) 15 MG tablet Take 1 tablet (15 mg total) by mouth daily as needed for Pain. 30 tablet 5     Current Facility-Administered Medications on File Prior to Visit   Medication Dose Route Frequency Provider Last Rate Last Admin    aspirin chewable tablet 81 mg  81 mg Oral Daily Herbert Guzman MD           Allergies:  Review of patient's allergies indicates:  No Known Allergies    Review of Systems   Constitutional:  Positive for weight loss. Negative for diaphoresis, fever and malaise/fatigue.   Eyes:  Positive for blurred vision.   Respiratory:  Positive for shortness of breath.    Gastrointestinal:  Positive for constipation. Negative for abdominal pain and diarrhea.   Genitourinary:  Negative for dysuria, frequency, hematuria and urgency.   Neurological:  Negative for tingling, tremors, loss of consciousness, weakness and headaches.       Exam:  Vitals:    01/31/24 1031   BP: 126/84   Pulse: 60   Resp: 18   Temp: (!) 95.2 °F (35.1 °C)     Weight: 57.5 kg (126 lb 12.2 oz)   Body mass index is 22.46 kg/m².      Physical Exam  Constitutional:       General: He is not in acute distress.     Appearance: He is normal weight. He is not ill-appearing.   Cardiovascular:      Rate and Rhythm: Normal rate and regular rhythm.      Pulses: Normal pulses.      Heart sounds: Normal heart sounds.   Pulmonary:      Effort: Pulmonary effort is normal. No respiratory distress.      Breath sounds: Normal breath sounds.   Abdominal:      General: Abdomen is flat. Bowel sounds are normal.      Palpations: Abdomen is soft.      Tenderness: There is no abdominal tenderness. There is no guarding or rebound.   Skin:     General: Skin is warm and dry.   Neurological:      Mental Status: He is alert.         Assessment:  The primary encounter  diagnosis was Hospital discharge follow-up. Diagnoses of Shortness of breath, Decreased appetite, and Weight loss were also pertinent to this visit.    Plan:  Hospital discharge follow-up    Shortness of breath  -     Continue albuterol  -     NEBULIZER KIT (SUPPLIES) FOR HOME USE    Decreased appetite  -     start cyproheptadine (PERIACTIN) 4 mg tablet; Take 1 tablet (4 mg total) by mouth 2 (two) times daily as needed (decreased appetite).  Dispense: 60 tablet; Refill: 2    Weight loss  -     start cyproheptadine (PERIACTIN) 4 mg tablet; Take 1 tablet (4 mg total) by mouth 2 (two) times daily as needed (decreased appetite).  Dispense: 60 tablet; Refill: 2    RTC in June for annual visit as previously scheduled.     Christian Vivar  MS4    I hereby acknowledge that I am relying upon documentation authored by a medical student working under my supervision and further I hereby attest that I have verified the student documentation or findings by personally performing the physical exam and medical decision making activities of the Evaluation and Management service to be billed.     Penny Watson D.O.

## 2024-02-05 ENCOUNTER — LAB VISIT (OUTPATIENT)
Dept: LAB | Facility: HOSPITAL | Age: 89
End: 2024-02-05
Attending: INTERNAL MEDICINE
Payer: MEDICARE

## 2024-02-05 ENCOUNTER — OFFICE VISIT (OUTPATIENT)
Dept: OPHTHALMOLOGY | Facility: CLINIC | Age: 89
End: 2024-02-05
Payer: MEDICARE

## 2024-02-05 ENCOUNTER — OFFICE VISIT (OUTPATIENT)
Dept: CARDIOLOGY | Facility: CLINIC | Age: 89
End: 2024-02-05
Payer: MEDICARE

## 2024-02-05 VITALS
BODY MASS INDEX: 22.77 KG/M2 | OXYGEN SATURATION: 97 % | SYSTOLIC BLOOD PRESSURE: 130 MMHG | HEART RATE: 68 BPM | DIASTOLIC BLOOD PRESSURE: 64 MMHG | HEIGHT: 63 IN | WEIGHT: 128.5 LBS

## 2024-02-05 DIAGNOSIS — I51.89 DIASTOLIC DYSFUNCTION: ICD-10-CM

## 2024-02-05 DIAGNOSIS — H53.8 BLURRY VISION: ICD-10-CM

## 2024-02-05 DIAGNOSIS — H04.203 EYE TEARING, BILATERAL: ICD-10-CM

## 2024-02-05 DIAGNOSIS — I70.0 ATHEROSCLEROSIS OF AORTA: ICD-10-CM

## 2024-02-05 DIAGNOSIS — R06.02 SHORTNESS OF BREATH: Primary | ICD-10-CM

## 2024-02-05 DIAGNOSIS — I48.0 PAROXYSMAL ATRIAL FIBRILLATION: ICD-10-CM

## 2024-02-05 DIAGNOSIS — R00.1 BRADYCARDIA: ICD-10-CM

## 2024-02-05 DIAGNOSIS — I50.32 CHRONIC DIASTOLIC CONGESTIVE HEART FAILURE: ICD-10-CM

## 2024-02-05 DIAGNOSIS — I27.9 PULMONARY HEART DISEASE: ICD-10-CM

## 2024-02-05 DIAGNOSIS — U07.1 COVID-19: ICD-10-CM

## 2024-02-05 LAB
ANION GAP SERPL CALC-SCNC: 8 MMOL/L (ref 8–16)
BNP SERPL-MCNC: 1517 PG/ML (ref 0–99)
BUN SERPL-MCNC: 31 MG/DL (ref 10–30)
CALCIUM SERPL-MCNC: 8.7 MG/DL (ref 8.7–10.5)
CHLORIDE SERPL-SCNC: 104 MMOL/L (ref 95–110)
CO2 SERPL-SCNC: 21 MMOL/L (ref 23–29)
CREAT SERPL-MCNC: 0.7 MG/DL (ref 0.5–1.4)
EST. GFR  (NO RACE VARIABLE): >60 ML/MIN/1.73 M^2
GLUCOSE SERPL-MCNC: 114 MG/DL (ref 70–110)
POTASSIUM SERPL-SCNC: 4.2 MMOL/L (ref 3.5–5.1)
SODIUM SERPL-SCNC: 133 MMOL/L (ref 136–145)

## 2024-02-05 PROCEDURE — 1101F PT FALLS ASSESS-DOCD LE1/YR: CPT | Mod: HCNC,CPTII,S$GLB, | Performed by: INTERNAL MEDICINE

## 2024-02-05 PROCEDURE — 83880 ASSAY OF NATRIURETIC PEPTIDE: CPT | Mod: HCNC | Performed by: INTERNAL MEDICINE

## 2024-02-05 PROCEDURE — 99999 PR PBB SHADOW E&M-EST. PATIENT-LVL IV: CPT | Mod: PBBFAC,HCNC,, | Performed by: INTERNAL MEDICINE

## 2024-02-05 PROCEDURE — 3288F FALL RISK ASSESSMENT DOCD: CPT | Mod: HCNC,CPTII,S$GLB, | Performed by: INTERNAL MEDICINE

## 2024-02-05 PROCEDURE — 36415 COLL VENOUS BLD VENIPUNCTURE: CPT | Mod: HCNC | Performed by: INTERNAL MEDICINE

## 2024-02-05 PROCEDURE — 1125F AMNT PAIN NOTED PAIN PRSNT: CPT | Mod: HCNC,CPTII,S$GLB, | Performed by: INTERNAL MEDICINE

## 2024-02-05 PROCEDURE — 99499 UNLISTED E&M SERVICE: CPT | Mod: HCNC,S$GLB,, | Performed by: OPTOMETRIST

## 2024-02-05 PROCEDURE — 99214 OFFICE O/P EST MOD 30 MIN: CPT | Mod: HCNC,S$GLB,, | Performed by: INTERNAL MEDICINE

## 2024-02-05 PROCEDURE — 1159F MED LIST DOCD IN RCRD: CPT | Mod: HCNC,CPTII,S$GLB, | Performed by: INTERNAL MEDICINE

## 2024-02-05 PROCEDURE — 99999 PR PBB SHADOW E&M-EST. PATIENT-LVL III: CPT | Mod: PBBFAC,HCNC,, | Performed by: OPTOMETRIST

## 2024-02-05 PROCEDURE — 1160F RVW MEDS BY RX/DR IN RCRD: CPT | Mod: HCNC,CPTII,S$GLB, | Performed by: INTERNAL MEDICINE

## 2024-02-05 PROCEDURE — 1111F DSCHRG MED/CURRENT MED MERGE: CPT | Mod: HCNC,CPTII,S$GLB, | Performed by: INTERNAL MEDICINE

## 2024-02-05 PROCEDURE — 80048 BASIC METABOLIC PNL TOTAL CA: CPT | Mod: HCNC | Performed by: INTERNAL MEDICINE

## 2024-02-05 NOTE — PROGRESS NOTES
SUBJECTIVE  Long Van Hines is 89 y.o. male  Visual acuity was not recorded.   Chief Complaint   Patient presents with    Annual Exam     Pt here for DFE. Pt was discharged 3 days ago from the hospital, he had Covid and other upper lung issues. Pt was in the hospital almost 2 weeks. Pt is very weak today. Pt has good and bad days with his vision. Pt states he feels like he's going to fall because of his vision.           HPI     Annual Exam     Additional comments: Pt here for DFE. Pt was discharged 3 days ago from   the hospital, he had Covid and other upper lung issues. Pt was in the   hospital almost 2 weeks. Pt is very weak today. Pt has good and bad days   with his vision. Pt states he feels like he's going to fall because of his   vision.            Comments    1. PCIOL 2021- OU  Checked BP and Pulse at 1:25pm Betzaida 132/78 and Pulse was 77.Rosana   states his chest sounds congested. Pt was discharged from the hospital 3   days ago with covid after a 2 weeks stay in the hospital.   Pulse Ox was 95          Last edited by Waldo Mathis, OD on 2/5/2024  1:41 PM.         Assessment /Plan :  1. Eye tearing, bilateral    2. Blurry vision      CALLED RAPID RESPONSE:  Daughter decided to bring him to ED at [facility].     RTC prn

## 2024-02-05 NOTE — PROGRESS NOTES
Subjective:   Patient ID:  Long Albarran is a 89 y.o. male who presents for follow up of No chief complaint on file.      89 yo male, came in for routine f/u. Accompanied with his daughter  PMG CHFpEF PAF, HTN DJD,    edho EF nl severe LAE,pulm HTN PAP 42 mmHG  Ekg NSR 1st AVB, LVH   's  chronic   Lasix added and SOB improved     visit  Improved SOB. BMP wnl  No dizziness faint.    Interval history   admitted for COVID 19 infection. EKG afib 's CXR clear troponin 0.06 x2, d/c home. The pulse dropped to 43   Came in with daughter  Occasional SOB cough, no orthopnea fever  Cr 0.7 and AFIB VR C          Past Medical History:   Diagnosis Date    A-fib     Diastolic dysfunction 2017    Hypertriglyceridemia        Past Surgical History:   Procedure Laterality Date    ABDOMINAL SURGERY      appendix removal in [place] 3 years ago    CATARACT EXTRACTION, BILATERAL         Social History     Tobacco Use    Smoking status: Former     Current packs/day: 0.00     Average packs/day: 1 pack/day for 61.0 years (61.0 ttl pk-yrs)     Types: Cigarettes     Start date:      Quit date:      Years since quittin.1     Passive exposure: Past    Smokeless tobacco: Never   Substance Use Topics    Alcohol use: No     Alcohol/week: 0.0 standard drinks of alcohol    Drug use: No       Family History   Problem Relation Age of Onset    Cancer Neg Hx     Diabetes Neg Hx     Heart disease Neg Hx     Hypertension Neg Hx     Kidney disease Neg Hx     Stroke Neg Hx          ROS    Objective:   Physical Exam  HENT:      Head: Normocephalic.   Eyes:      Pupils: Pupils are equal, round, and reactive to light.   Neck:      Thyroid: No thyromegaly.      Vascular: Normal carotid pulses. No carotid bruit or JVD.   Cardiovascular:      Rate and Rhythm: Normal rate and regular rhythm. No extrasystoles are present.     Chest Wall: PMI is not displaced.      Pulses: Normal pulses.      Heart sounds: Normal  "heart sounds. No murmur heard.     No gallop. No S3 sounds.   Pulmonary:      Effort: No respiratory distress.      Breath sounds: No stridor. Examination of the right-lower field reveals rales. Examination of the left-lower field reveals rales. Rales present.   Abdominal:      General: Bowel sounds are normal.      Palpations: Abdomen is soft.      Tenderness: There is no abdominal tenderness. There is no rebound.   Skin:     Findings: No rash.   Neurological:      Mental Status: He is alert and oriented to person, place, and time.   Psychiatric:         Behavior: Behavior normal.         Lab Results   Component Value Date    CHOL 191 12/13/2023    CHOL 213 (H) 04/19/2023    CHOL 191 07/19/2017     Lab Results   Component Value Date    HDL 39 (L) 12/13/2023    HDL 46 04/19/2023    HDL 36 (L) 07/19/2017     Lab Results   Component Value Date    LDLCALC 116.8 12/13/2023    LDLCALC 146.0 04/19/2023    LDLCALC 108.2 07/19/2017     Lab Results   Component Value Date    TRIG 176 (H) 12/13/2023    TRIG 105 04/19/2023    TRIG 234 (H) 07/19/2017     Lab Results   Component Value Date    CHOLHDL 20.4 12/13/2023    CHOLHDL 21.6 04/19/2023    CHOLHDL 18.8 (L) 07/19/2017       Chemistry        Component Value Date/Time     (L) 02/05/2024 0948    K 4.2 02/05/2024 0948     02/05/2024 0948    CO2 21 (L) 02/05/2024 0948    BUN 31 (H) 02/05/2024 0948    CREATININE 0.7 02/05/2024 0948     (H) 02/05/2024 0948        Component Value Date/Time    CALCIUM 8.7 02/05/2024 0948    ALKPHOS 68 01/24/2024 0431    AST 37 01/24/2024 0431    ALT 27 01/24/2024 0431    BILITOT 0.8 01/24/2024 0431    ESTGFRAFRICA >60 01/07/2022 0107    EGFRNONAA >60 01/07/2022 0107          No results found for: "LABA1C", "HGBA1C"  Lab Results   Component Value Date    TSH 0.894 01/21/2024     Lab Results   Component Value Date    INR 1.1 01/22/2024     Lab Results   Component Value Date    WBC 6.30 01/25/2024    HGB 14.6 01/25/2024    HCT 41.1 " 01/25/2024    MCV 84 01/25/2024     (L) 01/25/2024     BMP  Sodium   Date Value Ref Range Status   02/05/2024 133 (L) 136 - 145 mmol/L Final     Potassium   Date Value Ref Range Status   02/05/2024 4.2 3.5 - 5.1 mmol/L Final     Chloride   Date Value Ref Range Status   02/05/2024 104 95 - 110 mmol/L Final     CO2   Date Value Ref Range Status   02/05/2024 21 (L) 23 - 29 mmol/L Final     BUN   Date Value Ref Range Status   02/05/2024 31 (H) 10 - 30 mg/dL Final     Creatinine   Date Value Ref Range Status   02/05/2024 0.7 0.5 - 1.4 mg/dL Final     Calcium   Date Value Ref Range Status   02/05/2024 8.7 8.7 - 10.5 mg/dL Final     Anion Gap   Date Value Ref Range Status   02/05/2024 8 8 - 16 mmol/L Final     eGFR if    Date Value Ref Range Status   01/07/2022 >60 >60 mL/min/1.73 m^2 Final     eGFR if non    Date Value Ref Range Status   01/07/2022 >60 >60 mL/min/1.73 m^2 Final     Comment:     Calculation used to obtain the estimated glomerular filtration  rate (eGFR) is the CKD-EPI equation.        BNP  @LABRCNTIP(BNP,BNPTRIAGEBLO)@  @LABRCNTIP(troponini)@  Estimated Creatinine Clearance: 51.9 mL/min (based on SCr of 0.7 mg/dL).  No results found in the last 24 hours.  No results found in the last 24 hours.  No results found in the last 24 hours.    Assessment:      1. Shortness of breath    2. Bradycardia    3. Paroxysmal atrial fibrillation    4. Atherosclerosis of aorta    5. Diastolic dysfunction    6. Pulmonary heart disease    7. Chronic diastolic congestive heart failure    8. COVID-19        Plan:   Repeat echo   BARDU for tachy daniel afib  Continue lasix and Imdur  RTC in 3 M   BNP and BMP today

## 2024-02-06 ENCOUNTER — PATIENT OUTREACH (OUTPATIENT)
Dept: ADMINISTRATIVE | Facility: OTHER | Age: 89
End: 2024-02-06
Payer: MEDICARE

## 2024-02-06 ENCOUNTER — TELEPHONE (OUTPATIENT)
Dept: CARDIOLOGY | Facility: CLINIC | Age: 89
End: 2024-02-06
Payer: MEDICARE

## 2024-02-06 DIAGNOSIS — I50.32 CHRONIC DIASTOLIC CONGESTIVE HEART FAILURE: Primary | ICD-10-CM

## 2024-02-06 RX ORDER — FUROSEMIDE 20 MG/1
40 TABLET ORAL DAILY
Qty: 60 TABLET | Refills: 5 | Status: SHIPPED | OUTPATIENT
Start: 2024-02-06

## 2024-02-06 NOTE — TELEPHONE ENCOUNTER
Spoke with pt's daughter in regards to lab results. Pt was scheduled for upcoming appt.               ----- Message from Bethel Cross MD sent at 2/6/2024  4:48 PM CST -----  The lab showed CHF  Increase Lasix from 20 mg daily to 40 mg daily  Repeat BNP and BMP in 1 week

## 2024-02-06 NOTE — PROGRESS NOTES
This Community Health Worker (CHW) completed Social Determinant of Health (SDOH)  Questionnaire with patient/caregiver via telephone today.  Notified O[facility] CM Araceli ALMEIDA RN, of completion and that I informed pt/caregiver that pt is not eligible for post discharge meals for ED visits, just admits.      Patient denied any SDOH needs at this time.

## 2024-02-07 ENCOUNTER — OUTPATIENT CASE MANAGEMENT (OUTPATIENT)
Dept: ADMINISTRATIVE | Facility: OTHER | Age: 89
End: 2024-02-07
Payer: MEDICARE

## 2024-02-09 ENCOUNTER — EXTERNAL HOME HEALTH (OUTPATIENT)
Dept: HOME HEALTH SERVICES | Facility: HOSPITAL | Age: 89
End: 2024-02-09
Payer: MEDICARE

## 2024-02-13 ENCOUNTER — LAB VISIT (OUTPATIENT)
Dept: LAB | Facility: HOSPITAL | Age: 89
End: 2024-02-13
Attending: INTERNAL MEDICINE
Payer: MEDICARE

## 2024-02-13 DIAGNOSIS — I50.32 CHRONIC DIASTOLIC HEART FAILURE: Primary | ICD-10-CM

## 2024-02-13 LAB
ANION GAP SERPL CALC-SCNC: 12 MMOL/L (ref 8–16)
BUN SERPL-MCNC: 22 MG/DL (ref 10–30)
CALCIUM SERPL-MCNC: 8.4 MG/DL (ref 8.7–10.5)
CHLORIDE SERPL-SCNC: 101 MMOL/L (ref 95–110)
CO2 SERPL-SCNC: 24 MMOL/L (ref 23–29)
CREAT SERPL-MCNC: 0.9 MG/DL (ref 0.5–1.4)
EST. GFR  (NO RACE VARIABLE): >60 ML/MIN/1.73 M^2
GLUCOSE SERPL-MCNC: 85 MG/DL (ref 70–110)
MAGNESIUM SERPL-MCNC: 2.1 MG/DL (ref 1.6–2.6)
POTASSIUM SERPL-SCNC: 4.4 MMOL/L (ref 3.5–5.1)
SODIUM SERPL-SCNC: 137 MMOL/L (ref 136–145)

## 2024-02-13 PROCEDURE — 83735 ASSAY OF MAGNESIUM: CPT | Mod: HCNC | Performed by: INTERNAL MEDICINE

## 2024-02-13 PROCEDURE — 36415 COLL VENOUS BLD VENIPUNCTURE: CPT | Mod: HCNC | Performed by: INTERNAL MEDICINE

## 2024-02-13 PROCEDURE — 80048 BASIC METABOLIC PNL TOTAL CA: CPT | Mod: HCNC | Performed by: INTERNAL MEDICINE

## 2024-02-16 NOTE — PHYSICIAN QUERY
PT Name: Long Albarran  MR #: [id number]     DOCUMENTATION CLARIFICATION      CDS/: uRth ManeRN               Contact information: [email or web address]  This form is a permanent document in the medical record.    Query Date: February 16, 2024    By submitting this query, we are merely seeking further clarification of documentation. Please utilize your independent clinical judgment when addressing the question(s) below.     The Medical Record contains the following:   Indicators   Supporting Clinical Findings Location in Medical Record   x Chest Pain, Angina Cardiovascular:  Negative for chest pain, palpitations and leg swelling.    Patient stated he was  having shortness of breathe, but denied any chest pain.   H&P 1/21    Cards Consult 1/21    Coronary Artery Disease     x EKG EKG:  SINUS RYTHM WITH WENCKEBACH WITH VARIABLE BLOCK   Moderate voltage criteria for LVH, may be normal variant   ST and T wave abnormality, consider inferior ischemia   ST and T wave abnormality, consider anterolateral ischemia   Prolonged QT   Abnormal ECG     Junctional bradycardia   Minimal voltage criteria for LVH, may be normal variant   ST and T wave abnormality, consider inferior ischemia   ST and T wave abnormality, consider anterolateral ischemia   Abnormal ECG    EKG 1/21 0215                EKG 1/21 0559   x Troponin  01/21/24 03:06 01/21/24 06:26 01/21/24 14:21 01/22/24 16:26   Troponin I 0.096 (H) 0.083 (H) 0.068 (H) 0.075 (H)    Labs 1/21-1/22            Echo Results      Angiography     x Documentation of acute cardiac condition NSTEMI   ED Prov Note 1/21      Medication/Treatment     x Other CARDS CONSULT OBTAINED IN ER DUE TO BRADYCARDIA PATIENT SEEMS TO HAVE H/O AFIB HAS WENCKEBACH ON OUTPATIENT EKG. IN ER HE HAD EPISODES OF WENCKEBACH 2:1 BLOCK HIGH GRADE BLOCK. NO AFIB NOTED. HE IS COVID POSITIVE .TROPONIN MILDY ELEVATED FLAT CURVE   Cards Consult 1/21          Due to the conflicting clinical picture,  please clinically validate the diagnosis of __NSTEMI_______________.    If validated, please provide additional clinical support for the diagnosis.     [ x  ] NSTEMI  is not confirmed and/or it has been ruled out     [   ] NSTEMI  is confirmed.   Please specify clinical support (signs, symptoms, and treatment) for  the confirmed diagnosis: ____________________     [   ] Other clarification (please specify): ___________________           Please document in your progress notes daily for the duration of treatment until resolved, and include in your discharge summary.    Form No. [id number]

## 2024-02-21 ENCOUNTER — OUTPATIENT CASE MANAGEMENT (OUTPATIENT)
Dept: ADMINISTRATIVE | Facility: OTHER | Age: 89
End: 2024-02-21
Payer: MEDICARE

## 2024-02-22 ENCOUNTER — HOSPITAL ENCOUNTER (OUTPATIENT)
Dept: CARDIOLOGY | Facility: HOSPITAL | Age: 89
Discharge: HOME | End: 2024-02-22
Attending: INTERNAL MEDICINE
Payer: MEDICARE

## 2024-02-22 VITALS
SYSTOLIC BLOOD PRESSURE: 130 MMHG | BODY MASS INDEX: 22.68 KG/M2 | WEIGHT: 128 LBS | DIASTOLIC BLOOD PRESSURE: 64 MMHG | HEIGHT: 63 IN

## 2024-02-22 DIAGNOSIS — I48.0 PAROXYSMAL ATRIAL FIBRILLATION: ICD-10-CM

## 2024-02-22 DIAGNOSIS — R06.02 SHORTNESS OF BREATH: ICD-10-CM

## 2024-02-22 DIAGNOSIS — R00.1 BRADYCARDIA: ICD-10-CM

## 2024-02-22 LAB
AORTIC ROOT ANNULUS: 2.91 CM
AV INDEX (PROSTH): 0.57
AV MEAN GRADIENT: 5 MMHG
AV PEAK GRADIENT: 10 MMHG
AV VALVE AREA BY VELOCITY RATIO: 2.16 CM²
AV VALVE AREA: 1.92 CM²
AV VELOCITY RATIO: 0.64
BSA FOR ECHO PROCEDURE: 1.61 M2
CV ECHO LV RWT: 0.56 CM
DOP CALC AO PEAK VEL: 1.56 M/S
DOP CALC AO VTI: 28.5 CM
DOP CALC LVOT AREA: 3.4 CM2
DOP CALC LVOT DIAMETER: 2.07 CM
DOP CALC LVOT PEAK VEL: 1 M/S
DOP CALC LVOT STROKE VOLUME: 54.83 CM3
DOP CALC RVOT PEAK VEL: 0.97 M/S
DOP CALC RVOT VTI: 20.3 CM
DOP CALCLVOT PEAK VEL VTI: 16.3 CM
E WAVE DECELERATION TIME: 152.6 MSEC
E/A RATIO: 1.77
E/E' RATIO: 9.18 M/S
ECHO LV POSTERIOR WALL: 1.32 CM (ref 0.6–1.1)
FRACTIONAL SHORTENING: 33 % (ref 28–44)
INTERVENTRICULAR SEPTUM: 1.65 CM (ref 0.6–1.1)
IVC DIAMETER: 1.69 CM
IVRT: 111.32 MSEC
LA MAJOR: 6.47 CM
LA MINOR: 6.55 CM
LEFT ATRIUM SIZE: 3.07 CM
LEFT ATRIUM VOLUME INDEX MOD: 43.6 ML/M2
LEFT ATRIUM VOLUME MOD: 69.68 CM3
LEFT INTERNAL DIMENSION IN SYSTOLE: 3.16 CM (ref 2.1–4)
LEFT VENTRICLE DIASTOLIC VOLUME INDEX: 64.08 ML/M2
LEFT VENTRICLE DIASTOLIC VOLUME: 102.52 ML
LEFT VENTRICLE MASS INDEX: 181 G/M2
LEFT VENTRICLE SYSTOLIC VOLUME INDEX: 24.8 ML/M2
LEFT VENTRICLE SYSTOLIC VOLUME: 39.71 ML
LEFT VENTRICULAR INTERNAL DIMENSION IN DIASTOLE: 4.7 CM (ref 3.5–6)
LEFT VENTRICULAR MASS: 289.63 G
LV LATERAL E/E' RATIO: 7.8 M/S
LV SEPTAL E/E' RATIO: 11.14 M/S
LVOT MG: 2.06 MMHG
LVOT MV: 0.67 CM/S
MV PEAK A VEL: 0.44 M/S
MV PEAK E VEL: 0.78 M/S
MV STENOSIS PRESSURE HALF TIME: 44.25 MS
MV VALVE AREA P 1/2 METHOD: 4.97 CM2
PISA TR MAX VEL: 3.12 M/S
PV MEAN GRADIENT: 2 MMHG
PV MV: 0.65 M/S
PV PEAK GRADIENT: 4 MMHG
PV PEAK VELOCITY: 1.03 M/S
RA MAJOR: 5.42 CM
RA PRESSURE ESTIMATED: 3 MMHG
RA WIDTH: 4 CM
RIGHT VENTRICULAR END-DIASTOLIC DIMENSION: 4.4 CM
RV TB RVSP: 6 MMHG
SINUS: 3.23 CM
STJ: 3.35 CM
TDI LATERAL: 0.1 M/S
TDI SEPTAL: 0.07 M/S
TDI: 0.09 M/S
TR MAX PG: 39 MMHG
TRICUSPID ANNULAR PLANE SYSTOLIC EXCURSION: 1.14 CM
TV REST PULMONARY ARTERY PRESSURE: 42 MMHG
Z-SCORE OF LEFT VENTRICULAR DIMENSION IN END DIASTOLE: 0.32
Z-SCORE OF LEFT VENTRICULAR DIMENSION IN END SYSTOLE: 0.89

## 2024-02-22 PROCEDURE — 93306 TTE W/DOPPLER COMPLETE: CPT | Mod: 26,HCNC,, | Performed by: INTERNAL MEDICINE

## 2024-02-22 PROCEDURE — 93248 EXT ECG>7D<15D REV&INTERPJ: CPT | Mod: HCNC,,, | Performed by: INTERNAL MEDICINE

## 2024-02-22 PROCEDURE — 93306 TTE W/DOPPLER COMPLETE: CPT | Mod: HCNC

## 2024-02-25 ENCOUNTER — TELEPHONE (OUTPATIENT)
Dept: CARDIOLOGY | Facility: CLINIC | Age: 89
End: 2024-02-25
Payer: MEDICARE

## 2024-02-25 DIAGNOSIS — I50.32 CHRONIC DIASTOLIC CONGESTIVE HEART FAILURE: Primary | ICD-10-CM

## 2024-02-26 ENCOUNTER — TELEPHONE (OUTPATIENT)
Dept: CARDIOLOGY | Facility: CLINIC | Age: 89
End: 2024-02-26
Payer: MEDICARE

## 2024-02-26 NOTE — TELEPHONE ENCOUNTER
Attempted to contact patient; LVM for patient to call back for results.       ----- Message from Bethel Cross MD sent at 2/25/2024 12:31 PM CST -----  The lab showed CHF improved  Continue Lasix 40 mg daily  Repeat BMP in 2 weeks

## 2024-02-26 NOTE — TELEPHONE ENCOUNTER
Attempted to contact patient; LVM for patient to call back for results.       ----- Message from Bethel Cross MD sent at 2/25/2024 12:32 PM CST -----  Echo showed the function stable

## 2024-02-27 ENCOUNTER — TELEPHONE (OUTPATIENT)
Dept: INTERNAL MEDICINE | Facility: CLINIC | Age: 89
End: 2024-02-27
Payer: MEDICARE

## 2024-02-27 NOTE — TELEPHONE ENCOUNTER
----- Message from Brooke Castorena sent at 2/27/2024  1:03 PM CST -----  Regarding: pt advice  Contact: [phone]  Laurita calling from  [employer] in reference to pt complaining of blood in urine for last 2 days ,and pressure at lower part of abdomen, needing a for urine analysis, history of UTI. Pls clifford         FAX [phone]

## 2024-02-29 ENCOUNTER — TELEPHONE (OUTPATIENT)
Dept: CARDIOLOGY | Facility: CLINIC | Age: 89
End: 2024-02-29
Payer: MEDICARE

## 2024-02-29 ENCOUNTER — NURSE TRIAGE (OUTPATIENT)
Dept: ADMINISTRATIVE | Facility: CLINIC | Age: 89
End: 2024-02-29
Payer: MEDICARE

## 2024-02-29 ENCOUNTER — TELEPHONE (OUTPATIENT)
Dept: OPHTHALMOLOGY | Facility: CLINIC | Age: 89
End: 2024-02-29
Payer: MEDICARE

## 2024-02-29 ENCOUNTER — OUTPATIENT CASE MANAGEMENT (OUTPATIENT)
Dept: ADMINISTRATIVE | Facility: OTHER | Age: 89
End: 2024-02-29
Payer: MEDICARE

## 2024-02-29 NOTE — TELEPHONE ENCOUNTER
[place]    PCP:  Dr. Penny Watson    Spoke with Dtr, Regine Hines, on pts behalf.  She states cardiac monitor is not connected.  Vital Connected monitor isn't connected.  Per protocol, care advised is discuss with Provider and callback within 1 hr.  Message routed high priority to Cards.  Dtr hung up on NT.    Reason for Disposition   [1] Follow-up call from patient regarding patient's clinical status AND [2] information urgent    Additional Information   Negative: ED call to PCP (i.e., primary care provider; doctor, NP, or PA)   Negative: Doctor (or NP/PA) call to PCP   Negative: Call about patient who is currently hospitalized   Negative: Lab or radiology calling with CRITICAL test results    Protocols used: PCP Call - No Triage-A-

## 2024-02-29 NOTE — TELEPHONE ENCOUNTER
[facility] staff notified to call pt.                    ----- Message from Araceli Alexandra RN sent at 2/29/2024  8:42 AM CST -----  Dr Wesis/Staff,     The cardiac monitor Mr Hines is currently wearing is no longer working. His daughter, Marino, called me this AM to report his phone is saying it is not connected and there is no longer a light illuminated on the device he is wearing.    Thank you,     Araceli Alexandra RN, [facility]  Outpatient Case Management  [phone]  Ext [phone]  [email or web address]

## 2024-02-29 NOTE — TELEPHONE ENCOUNTER
[facility] staff notified to call pt.                ----- Message from Diamone Speed sent at 2/29/2024  8:49 AM CST -----  Regarding: daughter perdue  Type: Patient Call Back       Who called:sandra perdue        What is the request in detail: stated that her father just gotten out the shower and she tried to put another patch on put its not connecting to the phone        Can the clinic reply by [facility]? Yes       Would the patient rather a call back or a response via [facility]? Call back       Best call back number: [phone]

## 2024-02-29 NOTE — TELEPHONE ENCOUNTER
----- Message from Collette Mathur sent at 2/29/2024 10:34 AM CST -----  Contact: jason  .Patient is calling to speak with the nurse regarding appt  . Reports needing to schedule  appt  . Please give patient a call back at   [phone]

## 2024-02-29 NOTE — PROGRESS NOTES
Outpatient Care Management  Plan of Care Follow Up Visit    Patient: Long Albarran  MRN: [id number]  Date of Service: 02/29/2024  Completed by: Araceli Alexandra RN  Referral Date: 01/22/2024    Reason for Visit   Patient presents with    OP[facility] RN Follow Up Call       Brief Summary: Rec'd phone call from daughter, Marino, advising Mr Hines is having blood in his urine and complaining of pressure in his lower abdomen. Noted on chart review this was reported to Dr Watson on 2/27/24 by his Brittney  SN. Also noted Dr Watson gave order for UA with reflex culture on 2/28/24. This CM contacted [facility] this AM and was advised they have rec'd the order and Mr Hines has a SN visit scheduled today to collect a urine specimen. Phoned Marino back to advise of this and also advised that she should assist Mr Hines to seek treatment with a PCP/NP appt or Urgent Care Clinic visit if his symptoms worsen to include fever, confusion or other symptoms causing concern.   Next Steps: Follow up as scheduled and confirm UA was completed and he is taking any ordered meds as prescribed. Araceli Alexandra RN

## 2024-03-18 ENCOUNTER — OUTPATIENT CASE MANAGEMENT (OUTPATIENT)
Dept: ADMINISTRATIVE | Facility: OTHER | Age: 89
End: 2024-03-18
Payer: MEDICARE

## 2024-03-18 LAB
OHS CV HOLTER SINUS AVERAGE HR: 65
OHS CV HOLTER SINUS MAX HR: 141
OHS CV HOLTER SINUS MIN HR: 38

## 2024-03-22 ENCOUNTER — DOCUMENT SCAN (OUTPATIENT)
Dept: HOME HEALTH SERVICES | Facility: HOSPITAL | Age: 89
End: 2024-03-22
Payer: MEDICARE

## 2024-03-25 ENCOUNTER — TELEPHONE (OUTPATIENT)
Dept: CARDIOLOGY | Facility: CLINIC | Age: 89
End: 2024-03-25
Payer: MEDICARE

## 2024-03-25 NOTE — TELEPHONE ENCOUNTER
The patient's daughter has been notified of the results and stated that the patient has been having blood in his urine for the past 2 to 3 weeks. I instructed the patient to reach out to the pcp or go to the er . Please advise          2 weeks monitor showed AFIB with controlled pulse   Continue current Rx.   F/U as scheduled

## 2024-04-01 ENCOUNTER — OUTPATIENT CASE MANAGEMENT (OUTPATIENT)
Dept: ADMINISTRATIVE | Facility: OTHER | Age: 89
End: 2024-04-01
Payer: MEDICARE

## 2024-04-04 ENCOUNTER — DOCUMENT SCAN (OUTPATIENT)
Dept: HOME HEALTH SERVICES | Facility: HOSPITAL | Age: 89
End: 2024-04-04
Payer: MEDICARE

## 2024-04-15 ENCOUNTER — OUTPATIENT CASE MANAGEMENT (OUTPATIENT)
Dept: ADMINISTRATIVE | Facility: OTHER | Age: 89
End: 2024-04-15
Payer: MEDICARE

## 2024-04-15 NOTE — LETTER
Long Hines  [address] [place] [place] [address]      Dear Long Hines,     I am your nurse with [facility] Outpatient Care Management Department. I have been unsuccessful at reaching you since we spoke on 4/1/24.  At your earliest convenience, I would like to discuss your healthcare progress.      Please contact me at [phone] from 8:00AM to 4:30 PM on Monday thru Friday.     As you know, [facility] is a program offered to you through [facility] where a nurse is available 24/7 to answer questions or provide medical advice, their number is [phone].    Thanks,      Araceli Alexandra RN  Outpatient Care Management

## 2024-04-19 ENCOUNTER — OFFICE VISIT (OUTPATIENT)
Dept: OPHTHALMOLOGY | Facility: CLINIC | Age: 89
End: 2024-04-19
Payer: MEDICARE

## 2024-04-19 ENCOUNTER — PATIENT MESSAGE (OUTPATIENT)
Dept: OPHTHALMOLOGY | Facility: CLINIC | Age: 89
End: 2024-04-19

## 2024-04-19 DIAGNOSIS — H17.9 LEFT CORNEAL SCAR WITH OPACITY: Primary | ICD-10-CM

## 2024-04-19 DIAGNOSIS — H04.123 DRY EYES, BILATERAL: ICD-10-CM

## 2024-04-19 PROCEDURE — 99999 PR PBB SHADOW E&M-EST. PATIENT-LVL II: CPT | Mod: PBBFAC,,, | Performed by: OPTOMETRIST

## 2024-04-19 PROCEDURE — 92015 DETERMINE REFRACTIVE STATE: CPT | Mod: S$GLB,,, | Performed by: OPTOMETRIST

## 2024-04-19 PROCEDURE — 1159F MED LIST DOCD IN RCRD: CPT | Mod: CPTII,S$GLB,, | Performed by: OPTOMETRIST

## 2024-04-19 PROCEDURE — 92014 COMPRE OPH EXAM EST PT 1/>: CPT | Mod: S$GLB,,, | Performed by: OPTOMETRIST

## 2024-04-19 NOTE — PROGRESS NOTES
SUBJECTIVE  Long Zane Hines is 89 y.o. male  Uncorrected distance visual acuity was 20/400 in the right eye and 20/200 in the left eye.   Chief Complaint   Patient presents with    Eye Exam    Blurred Vision          HPI    Blurred vision  Last eye visit 12/27/2023 TRF.  Update glasses RX.  Patient had a appointment 02/05/2024 but wasn't able to stay for   appointment .  Last edited by Mai Miller MA on 4/19/2024  8:30 AM.         Assessment /Plan :  1. Left corneal scar with opacity    2. Dry eyes, bilateral    History of blepharitis, pterygium, and dry eyes.    Poor view of retina due to keratitis, difficult refraction due to language barrier.    Consult Dr Serrano for cornea eval OD>OS

## 2024-04-22 ENCOUNTER — HOSPITAL ENCOUNTER (EMERGENCY)
Facility: HOSPITAL | Age: 89
Discharge: HOME | End: 2024-04-22
Attending: EMERGENCY MEDICINE
Payer: MEDICARE

## 2024-04-22 VITALS
OXYGEN SATURATION: 97 % | DIASTOLIC BLOOD PRESSURE: 65 MMHG | BODY MASS INDEX: 22.53 KG/M2 | RESPIRATION RATE: 18 BRPM | HEART RATE: 64 BPM | TEMPERATURE: 98 F | SYSTOLIC BLOOD PRESSURE: 128 MMHG | WEIGHT: 127.19 LBS

## 2024-04-22 DIAGNOSIS — R53.1 WEAKNESS: Primary | ICD-10-CM

## 2024-04-22 LAB
ALBUMIN SERPL BCP-MCNC: 3.8 G/DL (ref 3.5–5.2)
ALP SERPL-CCNC: 70 U/L (ref 55–135)
ALT SERPL W/O P-5'-P-CCNC: 14 U/L (ref 10–44)
ANION GAP SERPL CALC-SCNC: 11 MMOL/L (ref 8–16)
AST SERPL-CCNC: 29 U/L (ref 10–40)
BASOPHILS # BLD AUTO: 0.03 K/UL (ref 0–0.2)
BASOPHILS NFR BLD: 0.6 % (ref 0–1.9)
BILIRUB SERPL-MCNC: 0.9 MG/DL (ref 0.1–1)
BILIRUB UR QL STRIP: NEGATIVE
BNP SERPL-MCNC: 442 PG/ML (ref 0–99)
BUN SERPL-MCNC: 18 MG/DL (ref 10–30)
CALCIUM SERPL-MCNC: 9 MG/DL (ref 8.7–10.5)
CHLORIDE SERPL-SCNC: 102 MMOL/L (ref 95–110)
CK SERPL-CCNC: 174 U/L (ref 20–200)
CLARITY UR: CLEAR
CO2 SERPL-SCNC: 25 MMOL/L (ref 23–29)
COLOR UR: YELLOW
CREAT SERPL-MCNC: 1 MG/DL (ref 0.5–1.4)
DIFFERENTIAL METHOD BLD: NORMAL
EOSINOPHIL # BLD AUTO: 0.1 K/UL (ref 0–0.5)
EOSINOPHIL NFR BLD: 2.8 % (ref 0–8)
ERYTHROCYTE [DISTWIDTH] IN BLOOD BY AUTOMATED COUNT: 12.5 % (ref 11.5–14.5)
EST. GFR  (NO RACE VARIABLE): >60 ML/MIN/1.73 M^2
GLUCOSE SERPL-MCNC: 134 MG/DL (ref 70–110)
GLUCOSE UR QL STRIP: NEGATIVE
HCT VFR BLD AUTO: 43.8 % (ref 40–54)
HGB BLD-MCNC: 15.5 G/DL (ref 14–18)
HGB UR QL STRIP: NEGATIVE
IMM GRANULOCYTES # BLD AUTO: 0 K/UL (ref 0–0.04)
IMM GRANULOCYTES NFR BLD AUTO: 0 % (ref 0–0.5)
KETONES UR QL STRIP: NEGATIVE
LEUKOCYTE ESTERASE UR QL STRIP: NEGATIVE
LYMPHOCYTES # BLD AUTO: 1.9 K/UL (ref 1–4.8)
LYMPHOCYTES NFR BLD: 36.5 % (ref 18–48)
MCH RBC QN AUTO: 30.5 PG (ref 27–31)
MCHC RBC AUTO-ENTMCNC: 35.4 G/DL (ref 32–36)
MCV RBC AUTO: 86 FL (ref 82–98)
MONOCYTES # BLD AUTO: 0.6 K/UL (ref 0.3–1)
MONOCYTES NFR BLD: 12 % (ref 4–15)
NEUTROPHILS # BLD AUTO: 2.5 K/UL (ref 1.8–7.7)
NEUTROPHILS NFR BLD: 48.1 % (ref 38–73)
NITRITE UR QL STRIP: NEGATIVE
NRBC BLD-RTO: 0 /100 WBC
OHS QRS DURATION: 94 MS
OHS QTC CALCULATION: 468 MS
PH UR STRIP: 6 [PH] (ref 5–8)
PLATELET # BLD AUTO: 185 K/UL (ref 150–450)
PMV BLD AUTO: 10.1 FL (ref 9.2–12.9)
POTASSIUM SERPL-SCNC: 3.4 MMOL/L (ref 3.5–5.1)
PROT SERPL-MCNC: 7.8 G/DL (ref 6–8.4)
PROT UR QL STRIP: NEGATIVE
RBC # BLD AUTO: 5.08 M/UL (ref 4.6–6.2)
SODIUM SERPL-SCNC: 138 MMOL/L (ref 136–145)
SP GR UR STRIP: 1.01 (ref 1–1.03)
TROPONIN I SERPL DL<=0.01 NG/ML-MCNC: 0.09 NG/ML (ref 0–0.03)
URN SPEC COLLECT METH UR: NORMAL
UROBILINOGEN UR STRIP-ACNC: NEGATIVE EU/DL
WBC # BLD AUTO: 5.09 K/UL (ref 3.9–12.7)

## 2024-04-22 PROCEDURE — 81003 URINALYSIS AUTO W/O SCOPE: CPT | Performed by: EMERGENCY MEDICINE

## 2024-04-22 PROCEDURE — 84484 ASSAY OF TROPONIN QUANT: CPT | Performed by: EMERGENCY MEDICINE

## 2024-04-22 PROCEDURE — 99285 EMERGENCY DEPT VISIT HI MDM: CPT | Mod: 25

## 2024-04-22 PROCEDURE — 80053 COMPREHEN METABOLIC PANEL: CPT | Performed by: EMERGENCY MEDICINE

## 2024-04-22 PROCEDURE — 83880 ASSAY OF NATRIURETIC PEPTIDE: CPT | Performed by: EMERGENCY MEDICINE

## 2024-04-22 PROCEDURE — 82550 ASSAY OF CK (CPK): CPT | Performed by: EMERGENCY MEDICINE

## 2024-04-22 PROCEDURE — 93010 ELECTROCARDIOGRAM REPORT: CPT | Mod: ,,, | Performed by: INTERNAL MEDICINE

## 2024-04-22 PROCEDURE — 85025 COMPLETE CBC W/AUTO DIFF WBC: CPT | Performed by: EMERGENCY MEDICINE

## 2024-04-22 PROCEDURE — 93005 ELECTROCARDIOGRAM TRACING: CPT

## 2024-04-22 RX ORDER — NAPROXEN SODIUM 220 MG/1
TABLET, FILM COATED ORAL
Status: DISCONTINUED
Start: 2024-04-22 | End: 2024-04-22 | Stop reason: WASHOUT

## 2024-04-22 NOTE — PROGRESS NOTES
Outpatient Care Management  Plan of Care Follow Up Visit    Patient: Long Albarran  MRN: [id number]  Date of Service: 04/15/2024  Completed by: Araceli Alexandra RN  Referral Date: 01/22/2024    Reason for Visit   Patient presents with    OPCM RN Follow Up Call       Brief Summary: Phone contact with Mr Hines's daughter and primary caregiver, Marino, who advised he is reporting R hand weakness and impaired gait for the past week or so. She denied one sided facial droop or impaired speech. Encouraged her to take him to an Urgent Care Clinic or the ED for eval for possible stroke. She agreed she will take him to the ED now.   Next Steps: Will follow up in about two weeks to evaluate his status and the outcome of treatment for today's symptoms. Araceli Alexandra RN

## 2024-04-22 NOTE — ED NOTES
Pt reports abdominal pain, unsteady gait, and right hand numbness x 1 week. Pt grasps equal, no arm drift, no slurred speech, or facial droop. Pt family at bedside and denies any changes in mental status

## 2024-04-22 NOTE — ED PROVIDER NOTES
SCRIBE #1 NOTE: I, Zia Osorio, am scribing for, and in the presence of, Christian Parker MD. I have scribed the entire note.      History      Chief Complaint   Patient presents with    Extremity Weakness     Pt c/o right hand weakness and numbness x 1 week.  Pt also c/o being wobbly with walking x 2 weeks and requires a cane more than usual.         Review of patient's allergies indicates:  No Known Allergies     HPI   HPI    2024, 1:57 PM   History obtained from the patient      History of Present Illness: Long Albarran is a 89 y.o. male patient who presents to the Emergency Department for R hand weakness, onset 1 week PTA. Symptoms are constant and moderate in severity. No mitigating or exacerbating factors reported. Associated sxs include R hand numbness. Patient denies any fever, chills, n/v/d, SOB, CP, headache, and all other sxs at this time. No prior Tx reported. No further complaints or concerns at this time.     Arrival mode: Personal vehicle    PCP: Penny Watson DO       Past Medical History:  Past Medical History:   Diagnosis Date    A-fib     Diastolic dysfunction 2017    Hypertriglyceridemia        Past Surgical History:  Past Surgical History:   Procedure Laterality Date    ABDOMINAL SURGERY      appendix removal in [place] 3 years ago    CATARACT EXTRACTION, BILATERAL           Family History:  Family History   Problem Relation Name Age of Onset    Cancer Neg Hx      Diabetes Neg Hx      Heart disease Neg Hx      Hypertension Neg Hx      Kidney disease Neg Hx      Stroke Neg Hx         Social History:  Social History     Tobacco Use    Smoking status: Former     Current packs/day: 0.00     Average packs/day: 1 pack/day for 61.0 years (61.0 ttl pk-yrs)     Types: Cigarettes     Start date:      Quit date:      Years since quittin.3     Passive exposure: Past    Smokeless tobacco: Never   Substance and Sexual Activity    Alcohol use: No     Alcohol/week: 0.0 standard drinks  of alcohol    Drug use: No    Sexual activity: Not Currently       ROS   Review of Systems   Constitutional:  Negative for chills and fever.   HENT:  Negative for sore throat.    Respiratory:  Negative for shortness of breath.    Cardiovascular:  Negative for chest pain.   Gastrointestinal:  Negative for diarrhea, nausea and vomiting.   Genitourinary:  Negative for dysuria.   Musculoskeletal:  Negative for back pain.   Skin:  Negative for rash.   Neurological:  Positive for weakness (R hand) and numbness (R hand). Negative for dizziness and headaches.   Hematological:  Does not bruise/bleed easily.   All other systems reviewed and are negative.    Physical Exam      Initial Vitals [04/22/24 1328]   BP Pulse Resp Temp SpO2   132/62 67 16 97.7 °F (36.5 °C) 95 %      MAP       --          Physical Exam  Nursing Notes and Vital Signs Reviewed.  Constitutional: Patient is in no acute distress. Elderly. Frail.  Head: Atraumatic. Normocephalic.  Eyes: PERRL. EOM intact. Conjunctivae are not pale. No scleral icterus.  ENT: Mucous membranes are moist. Oropharynx is clear and symmetric.    Neck: Supple. Full ROM.   Cardiovascular: Regular rate. Regular rhythm. No murmurs, rubs, or gallops. Distal pulses are 2+ and symmetric.  Pulmonary/Chest: No respiratory distress. Clear to auscultation bilaterally. No wheezing or rales.  Abdominal: Soft and non-distended.  There is no tenderness.  No rebound, guarding, or rigidity.   Musculoskeletal: Moves all extremities. No obvious deformities. No edema.  Skin: Warm and dry.  Neurological:  Alert, awake, and appropriate.  Normal speech.  No acute focal neurological deficits are appreciated.  Psychiatric: Normal affect. Good eye contact. Appropriate in content.    ED Course    Procedures  ED Vital Signs:  Vitals:    04/22/24 1328 04/22/24 1500   BP: 132/62 128/65   Pulse: 67 64   Resp: 16 18   Temp: 97.7 °F (36.5 °C) 97.6 °F (36.4 °C)   TempSrc: Oral Oral   SpO2: 95% 97%   Weight: 57.7 kg  (127 lb 3.3 oz)        Abnormal Lab Results:  Labs Reviewed   COMPREHENSIVE METABOLIC PANEL - Abnormal; Notable for the following components:       Result Value    Potassium 3.4 (*)     Glucose 134 (*)     All other components within normal limits   B-TYPE NATRIURETIC PEPTIDE - Abnormal; Notable for the following components:     (*)     All other components within normal limits   TROPONIN I - Abnormal; Notable for the following components:    Troponin I 0.092 (*)     All other components within normal limits   CBC W/ AUTO DIFFERENTIAL   URINALYSIS, REFLEX TO URINE CULTURE    Narrative:     Specimen Source->Urine   CK        All Lab Results:  Results for orders placed or performed during the hospital encounter of 04/22/24   CBC Auto Differential   Result Value Ref Range    WBC 5.09 3.90 - 12.70 K/uL    RBC 5.08 4.60 - 6.20 M/uL    Hemoglobin 15.5 14.0 - 18.0 g/dL    Hematocrit 43.8 40.0 - 54.0 %    MCV 86 82 - 98 fL    MCH 30.5 27.0 - 31.0 pg    MCHC 35.4 32.0 - 36.0 g/dL    RDW 12.5 11.5 - 14.5 %    Platelets 185 150 - 450 K/uL    MPV 10.1 9.2 - 12.9 fL    Immature Granulocytes 0.0 0.0 - 0.5 %    Gran # (ANC) 2.5 1.8 - 7.7 K/uL    Immature Grans (Abs) 0.00 0.00 - 0.04 K/uL    Lymph # 1.9 1.0 - 4.8 K/uL    Mono # 0.6 0.3 - 1.0 K/uL    Eos # 0.1 0.0 - 0.5 K/uL    Baso # 0.03 0.00 - 0.20 K/uL    nRBC 0 0 /100 WBC    Gran % 48.1 38.0 - 73.0 %    Lymph % 36.5 18.0 - 48.0 %    Mono % 12.0 4.0 - 15.0 %    Eosinophil % 2.8 0.0 - 8.0 %    Basophil % 0.6 0.0 - 1.9 %    Differential Method Automated    Comprehensive Metabolic Panel   Result Value Ref Range    Sodium 138 136 - 145 mmol/L    Potassium 3.4 (L) 3.5 - 5.1 mmol/L    Chloride 102 95 - 110 mmol/L    CO2 25 23 - 29 mmol/L    Glucose 134 (H) 70 - 110 mg/dL    BUN 18 10 - 30 mg/dL    Creatinine 1.0 0.5 - 1.4 mg/dL    Calcium 9.0 8.7 - 10.5 mg/dL    Total Protein 7.8 6.0 - 8.4 g/dL    Albumin 3.8 3.5 - 5.2 g/dL    Total Bilirubin 0.9 0.1 - 1.0 mg/dL    Alkaline  Phosphatase 70 55 - 135 U/L    AST 29 10 - 40 U/L    ALT 14 10 - 44 U/L    eGFR >60 >60 mL/min/1.73 m^2    Anion Gap 11 8 - 16 mmol/L   Urinalysis, Reflex to Urine Culture Urine, Clean Catch    Specimen: Urine   Result Value Ref Range    Specimen UA Urine, Clean Catch     Color, UA Yellow Yellow, Straw, Francisca    Appearance, UA Clear Clear    pH, UA 6.0 5.0 - 8.0    Specific Gravity, UA 1.010 1.005 - 1.030    Protein, UA Negative Negative    Glucose, UA Negative Negative    Ketones, UA Negative Negative    Bilirubin (UA) Negative Negative    Occult Blood UA Negative Negative    Nitrite, UA Negative Negative    Urobilinogen, UA Negative <2.0 EU/dL    Leukocytes, UA Negative Negative   BNP   Result Value Ref Range     (H) 0 - 99 pg/mL   CK   Result Value Ref Range     20 - 200 U/L   Troponin I   Result Value Ref Range    Troponin I 0.092 (H) 0.000 - 0.026 ng/mL     Imaging Results:  Imaging Results              CT Head Without Contrast (Final result)  Result time 04/22/24 14:33:12      Final result by Ernst Caballero MD (04/22/24 14:33:12)                   Impression:      No acute intracranial CT abnormality.    All CT scans at this facility are performed  using dose modulation techniques as appropriate to performed exam including the following:  automated exposure control; adjustment of mA and/or kV according to the patients size (this includes techniques or standardized protocols for targeted exams where dose is matched to indication/reason for exam: i.e. extremities or head);  iterative reconstruction technique.      Electronically signed by: Ernst Caballero  Date:    04/22/2024  Time:    14:33               Narrative:    EXAMINATION:  CT HEAD WITHOUT CONTRAST    CLINICAL HISTORY:  Mental status change, unknown cause;    TECHNIQUE:  Low dose axial CT images obtained throughout the head without intravenous contrast. Sagittal and coronal reconstructions were performed.    COMPARISON:  [facility].    FINDINGS:  Intracranial compartment:    Ventricles and sulci are normal in size for age without evidence of hydrocephalus. No extra-axial blood or fluid collections.    Mild for age microvascular ischemic change.  No parenchymal mass, hemorrhage, edema or major vascular distribution infarct.    Skull/extracranial contents (limited evaluation): No fracture. Mastoid air cells and paranasal sinuses are essentially clear.                                       X-Ray Chest AP Portable (Final result)  Result time 04/22/24 14:22:51      Final result by Scott Soler MD (04/22/24 14:22:51)                   Impression:      No acute process seen.      Electronically signed by: Scott Soler MD  Date:    04/22/2024  Time:    14:22               Narrative:    EXAMINATION:  XR CHEST AP PORTABLE    CLINICAL HISTORY:  weakness;    FINDINGS:  Single view of the chest.  Comparison 01/21/2024    Cardiac silhouette is enlarged but stable.  The lungs demonstrate no evidence of active disease.  No evidence of pleural effusion or pneumothorax.  Bones appear intact.  Moderate degenerative changes and moderate atherosclerotic disease.                                     The EKG was ordered, reviewed, and independently interpreted by the ED provider.  Interpretation time: 13:53  Rate: 64 BPM  Rhythm: Atrial flutter with 4:1 AV conduction  Interpretation: LVH with repolarization abnormality. No STEMI.           The Emergency Provider reviewed the vital signs and test results, which are outlined above.    ED Discussion     2:40 PM: Reassessed pt at this time. Discussed with pt all pertinent ED information and results. Discussed pt dx and plan of tx. Gave pt all f/u and return to the ED instructions. All questions and concerns were addressed at this time. Pt expresses understanding of information and instructions, and is comfortable with plan to discharge. Pt is stable for discharge.    I discussed with patient and/or  family/caretaker that evaluation in the ED does not suggest any emergent or life threatening medical conditions requiring immediate intervention beyond what was provided in the ED, and I believe patient is safe for discharge.  Regardless, an unremarkable evaluation in the ED does not preclude the development or presence of a serious of life threatening condition. As such, patient was instructed to return immediately for any worsening or change in current symptoms.         ED Medication(s):  Medications - No data to display    Discharge Medication List as of 4/22/2024  2:40 PM            Medical Decision Making    Medical Decision Making  Complaining of generalized weakness for the last week.  Family reports that he is using a can now, when he hadn't previously  DDx: CVA, weakness    Amount and/or Complexity of Data Reviewed  Labs: ordered. Decision-making details documented in ED Course.  Radiology: ordered. Decision-making details documented in ED Course.  ECG/medicine tests: ordered and independent interpretation performed. Decision-making details documented in ED Course.    Risk  OTC drugs.                Scribe Attestation:   Scribe #1: I performed the above scribed service and the documentation accurately describes the services I performed. I attest to the accuracy of the note.    Attending:   Physician Attestation Statement for Scribe #1: I, Christian Parker MD, personally performed the services described in this documentation, as scribed by Zia Osorio, in my presence, and it is both accurate and complete.          Clinical Impression       ICD-10-CM ICD-9-CM   1. Weakness  R53.1 780.79       Disposition:   Disposition: Discharged  Condition: Stable         Christian Parker MD  04/22/24 1516

## 2024-04-30 ENCOUNTER — TELEPHONE (OUTPATIENT)
Dept: OPHTHALMOLOGY | Facility: CLINIC | Age: 89
End: 2024-04-30
Payer: MEDICARE

## 2024-04-30 NOTE — TELEPHONE ENCOUNTER
Returned patients call, informed him that we do not have anything sooner available at this time but him on the cancellation list and that contact him if anything becomes available sooner.      ----- Message from Yara Miller sent at 4/30/2024 11:04 AM CDT -----  Pt is scheduled for cornea eval in July would like to be worked in sooner please advise  [phone]

## 2024-05-06 ENCOUNTER — OFFICE VISIT (OUTPATIENT)
Dept: CARDIOLOGY | Facility: CLINIC | Age: 89
End: 2024-05-06
Payer: MEDICARE

## 2024-05-06 ENCOUNTER — OUTPATIENT CASE MANAGEMENT (OUTPATIENT)
Dept: ADMINISTRATIVE | Facility: OTHER | Age: 89
End: 2024-05-06
Payer: MEDICARE

## 2024-05-06 VITALS
HEART RATE: 50 BPM | DIASTOLIC BLOOD PRESSURE: 64 MMHG | WEIGHT: 127.88 LBS | OXYGEN SATURATION: 96 % | BODY MASS INDEX: 22.65 KG/M2 | SYSTOLIC BLOOD PRESSURE: 134 MMHG

## 2024-05-06 DIAGNOSIS — R00.1 BRADYCARDIA: ICD-10-CM

## 2024-05-06 DIAGNOSIS — I50.32 CHRONIC DIASTOLIC CONGESTIVE HEART FAILURE: Primary | ICD-10-CM

## 2024-05-06 DIAGNOSIS — I51.89 DIASTOLIC DYSFUNCTION: ICD-10-CM

## 2024-05-06 DIAGNOSIS — E78.89 LIPIDS ABNORMAL: ICD-10-CM

## 2024-05-06 DIAGNOSIS — I48.19 PERSISTENT ATRIAL FIBRILLATION: ICD-10-CM

## 2024-05-06 DIAGNOSIS — I70.0 ATHEROSCLEROSIS OF AORTA: ICD-10-CM

## 2024-05-06 DIAGNOSIS — I27.9 PULMONARY HEART DISEASE: ICD-10-CM

## 2024-05-06 PROCEDURE — 1160F RVW MEDS BY RX/DR IN RCRD: CPT | Mod: HCNC,CPTII,S$GLB, | Performed by: INTERNAL MEDICINE

## 2024-05-06 PROCEDURE — 1101F PT FALLS ASSESS-DOCD LE1/YR: CPT | Mod: HCNC,CPTII,S$GLB, | Performed by: INTERNAL MEDICINE

## 2024-05-06 PROCEDURE — 99214 OFFICE O/P EST MOD 30 MIN: CPT | Mod: HCNC,S$GLB,, | Performed by: INTERNAL MEDICINE

## 2024-05-06 PROCEDURE — 99999 PR PBB SHADOW E&M-EST. PATIENT-LVL III: CPT | Mod: PBBFAC,HCNC,, | Performed by: INTERNAL MEDICINE

## 2024-05-06 PROCEDURE — 1126F AMNT PAIN NOTED NONE PRSNT: CPT | Mod: HCNC,CPTII,S$GLB, | Performed by: INTERNAL MEDICINE

## 2024-05-06 PROCEDURE — 1159F MED LIST DOCD IN RCRD: CPT | Mod: HCNC,CPTII,S$GLB, | Performed by: INTERNAL MEDICINE

## 2024-05-06 PROCEDURE — 3288F FALL RISK ASSESSMENT DOCD: CPT | Mod: HCNC,CPTII,S$GLB, | Performed by: INTERNAL MEDICINE

## 2024-05-06 NOTE — PROGRESS NOTES
Subjective:   Patient ID:  Long Albarran is a 89 y.o. male who presents for follow up of No chief complaint on file.      89 yo male, came in for routine f/u. Accompanied with his daughter  PMG CHFpEF PAF, HTN DJD,    edho EF nl severe LAE,pulm HTN PAP 42 mmHG  Ekg NSR 1st AVB, LVH   's  chronic   Lasix added and SOB improved     visit  Improved SOB. BMP wnl  No dizziness faint.     visit   admitted for COVID 19 infection. EKG afib 's CXR clear troponin 0.06 x2, d/c home. The pulse dropped to 43   Came in with daughter  Occasional SOB cough, no orthopnea fever  Cr 0.7 and AFIB VR C    Interval history  The wife passed recently and felt sad,  Repeat BNP decreased to 200's from 1500 in   EKG AFIB VR C  No orthopnea PND and leg swelling           Past Medical History:   Diagnosis Date    A-fib     Diastolic dysfunction 2017    Hypertriglyceridemia        Past Surgical History:   Procedure Laterality Date    ABDOMINAL SURGERY      appendix removal in [place] 3 years ago    CATARACT EXTRACTION, BILATERAL         Social History     Tobacco Use    Smoking status: Former     Current packs/day: 0.00     Average packs/day: 1 pack/day for 61.0 years (61.0 ttl pk-yrs)     Types: Cigarettes     Start date:      Quit date:      Years since quittin.3     Passive exposure: Past    Smokeless tobacco: Never   Substance Use Topics    Alcohol use: No     Alcohol/week: 0.0 standard drinks of alcohol    Drug use: No       Family History   Problem Relation Name Age of Onset    Cancer Neg Hx      Diabetes Neg Hx      Heart disease Neg Hx      Hypertension Neg Hx      Kidney disease Neg Hx      Stroke Neg Hx           ROS    Objective:   Physical Exam  HENT:      Head: Normocephalic.   Eyes:      Pupils: Pupils are equal, round, and reactive to light.   Neck:      Thyroid: No thyromegaly.      Vascular: Normal carotid pulses. No carotid bruit or JVD.   Cardiovascular:  "     Rate and Rhythm: Normal rate and regular rhythm. No extrasystoles are present.     Chest Wall: PMI is not displaced.      Pulses: Normal pulses.      Heart sounds: Normal heart sounds. No murmur heard.     No gallop. No S3 sounds.   Pulmonary:      Effort: No respiratory distress.      Breath sounds: No stridor. Examination of the right-lower field reveals rales. Examination of the left-lower field reveals rales. Rales present.   Abdominal:      General: Bowel sounds are normal.      Palpations: Abdomen is soft.      Tenderness: There is no abdominal tenderness. There is no rebound.   Skin:     Findings: No rash.   Neurological:      Mental Status: He is alert and oriented to person, place, and time.   Psychiatric:         Behavior: Behavior normal.         Lab Results   Component Value Date    CHOL 191 12/13/2023    CHOL 213 (H) 04/19/2023    CHOL 191 07/19/2017     Lab Results   Component Value Date    HDL 39 (L) 12/13/2023    HDL 46 04/19/2023    HDL 36 (L) 07/19/2017     Lab Results   Component Value Date    LDLCALC 116.8 12/13/2023    LDLCALC 146.0 04/19/2023    LDLCALC 108.2 07/19/2017     Lab Results   Component Value Date    TRIG 176 (H) 12/13/2023    TRIG 105 04/19/2023    TRIG 234 (H) 07/19/2017     Lab Results   Component Value Date    CHOLHDL 20.4 12/13/2023    CHOLHDL 21.6 04/19/2023    CHOLHDL 18.8 (L) 07/19/2017       Chemistry        Component Value Date/Time     04/22/2024 1349    K 3.4 (L) 04/22/2024 1349     04/22/2024 1349    CO2 25 04/22/2024 1349    BUN 18 04/22/2024 1349    CREATININE 1.0 04/22/2024 1349     (H) 04/22/2024 1349        Component Value Date/Time    CALCIUM 9.0 04/22/2024 1349    ALKPHOS 70 04/22/2024 1349    AST 29 04/22/2024 1349    ALT 14 04/22/2024 1349    BILITOT 0.9 04/22/2024 1349    ESTGFRAFRICA >60 01/07/2022 0107    EGFRNONAA >60 01/07/2022 0107          No results found for: "LABA1C", "HGBA1C"  Lab Results   Component Value Date    TSH 0.894 " 01/21/2024     Lab Results   Component Value Date    INR 1.1 01/22/2024     Lab Results   Component Value Date    WBC 5.09 04/22/2024    HGB 15.5 04/22/2024    HCT 43.8 04/22/2024    MCV 86 04/22/2024     04/22/2024     BMP  Sodium   Date Value Ref Range Status   04/22/2024 138 136 - 145 mmol/L Final     Potassium   Date Value Ref Range Status   04/22/2024 3.4 (L) 3.5 - 5.1 mmol/L Final     Chloride   Date Value Ref Range Status   04/22/2024 102 95 - 110 mmol/L Final     CO2   Date Value Ref Range Status   04/22/2024 25 23 - 29 mmol/L Final     BUN   Date Value Ref Range Status   04/22/2024 18 10 - 30 mg/dL Final     Creatinine   Date Value Ref Range Status   04/22/2024 1.0 0.5 - 1.4 mg/dL Final     Calcium   Date Value Ref Range Status   04/22/2024 9.0 8.7 - 10.5 mg/dL Final     Anion Gap   Date Value Ref Range Status   04/22/2024 11 8 - 16 mmol/L Final     eGFR if    Date Value Ref Range Status   01/07/2022 >60 >60 mL/min/1.73 m^2 Final     eGFR if non    Date Value Ref Range Status   01/07/2022 >60 >60 mL/min/1.73 m^2 Final     Comment:     Calculation used to obtain the estimated glomerular filtration  rate (eGFR) is the CKD-EPI equation.        BNP  @LABRCNTIP(BNP,BNPTRIAGEBLO)@  @LABRCNTIP(troponini)@  CrCl cannot be calculated (Patient's most recent lab result is older than the maximum 7 days allowed.).  No results found in the last 24 hours.  No results found in the last 24 hours.  No results found in the last 24 hours.    Assessment:      1. Atherosclerosis of aorta    2. Persistent atrial fibrillation    3. Chronic diastolic congestive heart failure    4. Pulmonary heart disease    5. Lipids abnormal    6. Diastolic dysfunction    7. Bradycardia      Afib VRC  CHF rEF compensated  Cr 1.0  Plan:   Continue lasix 40 mg daily and Imdur  BNP and BMP in 3 m   RTC in 3M

## 2024-05-20 ENCOUNTER — TELEPHONE (OUTPATIENT)
Dept: CARDIOLOGY | Facility: CLINIC | Age: 89
End: 2024-05-20
Payer: MEDICARE

## 2024-05-20 ENCOUNTER — OUTPATIENT CASE MANAGEMENT (OUTPATIENT)
Dept: ADMINISTRATIVE | Facility: OTHER | Age: 89
End: 2024-05-20
Payer: MEDICARE

## 2024-05-20 NOTE — TELEPHONE ENCOUNTER
----- Message from Christine Escudero MA sent at 5/20/2024 11:13 AM CDT -----  Please advise  ----- Message -----  From: Araceli Alexandra RN  Sent: 5/20/2024  11:06 AM CDT  To: [facility] Staff    Dr Cross,    You documented that Mr Hines should continue Imdur at his 5/6/24 appt, but he has no refills available. Daughter, Marino, reports he only took this for 30 days when last ordered in Jan and the only med he is currently taking is Lasix 40 mg daily.     PLAN:  Continue lasix 40 mg daily and Imdur  BNP and BMP in 3 m   RTC in      Please have your staff advise pt/family about whether he is to be taking this and if so, please reorder.    Thank you,     Araceli Alexandra RN, [facility]  Outpatient Case Management  [phone]  Ext [phone]  [email or web address]

## 2024-05-20 NOTE — TELEPHONE ENCOUNTER
----- Message from Bethel Cross MD sent at 5/20/2024  4:29 PM CDT -----  If he stopped, then no need to take it  ----- Message -----  From: Christine Escudero MA  Sent: 5/20/2024  11:13 AM CDT  To: Bethel Cross MD    Please advise  ----- Message -----  From: Araceli Alexandra RN  Sent: 5/20/2024  11:06 AM CDT  To: Tay Hugo Staff    Dr Cross,    You documented that Mr Hines should continue Imdur at his 5/6/24 appt, but he has no refills available. Daughter, Marino, reports he only took this for 30 days when last ordered in Jan and the only med he is currently taking is Lasix 40 mg daily.     PLAN:  Continue lasix 40 mg daily and Imdur  BNP and BMP in 3 m   RTC in      Please have your staff advise pt/family about whether he is to be taking this and if so, please reorder.    Thank you,     Araceli Alexandra RN, [facility]  Outpatient Case Management  [phone]  Ext [phone]  [email or web address]

## 2024-05-30 ENCOUNTER — TELEPHONE (OUTPATIENT)
Dept: INTERNAL MEDICINE | Facility: CLINIC | Age: 89
End: 2024-05-30
Payer: MEDICARE

## 2024-06-04 ENCOUNTER — OUTPATIENT CASE MANAGEMENT (OUTPATIENT)
Dept: ADMINISTRATIVE | Facility: OTHER | Age: 89
End: 2024-06-04
Payer: MEDICARE

## 2024-06-05 ENCOUNTER — PATIENT OUTREACH (OUTPATIENT)
Dept: ADMINISTRATIVE | Facility: CLINIC | Age: 89
End: 2024-06-05
Payer: MEDICARE

## 2024-06-05 NOTE — PROGRESS NOTES
C3 nurse attempted to contact Logn Albarran  for a TCC post hospital discharge follow up call. No answer. Left voicemail with callback information. The patient has a scheduled HOSFU appointment with Araceli Ledbetter on 06/07/2024 @ 1120 am.   Message left via patient's portal regarding follow up call.

## 2024-06-06 NOTE — PROGRESS NOTES
C3 nurse spoke with patient's daughter Jomar for a TCC post hospital discharge follow up call. The patient has a scheduled [facility] appointment with Araceli Ledbetter on 06/07/2024 @ 1120 am

## 2024-06-07 ENCOUNTER — OFFICE VISIT (OUTPATIENT)
Dept: INTERNAL MEDICINE | Facility: CLINIC | Age: 89
End: 2024-06-07
Payer: MEDICARE

## 2024-06-07 VITALS
HEART RATE: 86 BPM | OXYGEN SATURATION: 96 % | BODY MASS INDEX: 22.38 KG/M2 | DIASTOLIC BLOOD PRESSURE: 60 MMHG | WEIGHT: 126.31 LBS | SYSTOLIC BLOOD PRESSURE: 118 MMHG | HEIGHT: 63 IN

## 2024-06-07 DIAGNOSIS — J18.9 PNEUMONIA OF LEFT LOWER LOBE DUE TO INFECTIOUS ORGANISM: Primary | ICD-10-CM

## 2024-06-07 DIAGNOSIS — I50.32 CHRONIC DIASTOLIC CONGESTIVE HEART FAILURE: ICD-10-CM

## 2024-06-07 DIAGNOSIS — I48.0 PAROXYSMAL ATRIAL FIBRILLATION: ICD-10-CM

## 2024-06-07 DIAGNOSIS — I70.0 ATHEROSCLEROSIS OF AORTA: ICD-10-CM

## 2024-06-07 PROCEDURE — 1101F PT FALLS ASSESS-DOCD LE1/YR: CPT | Mod: HCNC,CPTII,S$GLB, | Performed by: PHYSICIAN ASSISTANT

## 2024-06-07 PROCEDURE — 1159F MED LIST DOCD IN RCRD: CPT | Mod: HCNC,CPTII,S$GLB, | Performed by: PHYSICIAN ASSISTANT

## 2024-06-07 PROCEDURE — 1126F AMNT PAIN NOTED NONE PRSNT: CPT | Mod: HCNC,CPTII,S$GLB, | Performed by: PHYSICIAN ASSISTANT

## 2024-06-07 PROCEDURE — 99999 PR PBB SHADOW E&M-EST. PATIENT-LVL III: CPT | Mod: PBBFAC,HCNC,, | Performed by: PHYSICIAN ASSISTANT

## 2024-06-07 PROCEDURE — 3288F FALL RISK ASSESSMENT DOCD: CPT | Mod: HCNC,CPTII,S$GLB, | Performed by: PHYSICIAN ASSISTANT

## 2024-06-07 PROCEDURE — G2211 COMPLEX E/M VISIT ADD ON: HCPCS | Mod: HCNC,S$GLB,, | Performed by: PHYSICIAN ASSISTANT

## 2024-06-07 PROCEDURE — 99214 OFFICE O/P EST MOD 30 MIN: CPT | Mod: HCNC,S$GLB,, | Performed by: PHYSICIAN ASSISTANT

## 2024-06-07 PROCEDURE — 1160F RVW MEDS BY RX/DR IN RCRD: CPT | Mod: HCNC,CPTII,S$GLB, | Performed by: PHYSICIAN ASSISTANT

## 2024-06-07 NOTE — PROGRESS NOTES
"Subjective:      Patient ID: Long Albarran is a 89 y.o. male.    Chief Complaint: Follow-up    Patient is known to me, being seen today for follow up.     In hospital on 5/29 for elevated troponin, PNA, a-fib, CHF   CXR negative  CT chest "Ill-defined airspace opacities in the left lower lobe. No pleural or pericardial effusion."  Suspect troponin elevated related to demand ischemia   Discharged 6/1 with prednisone and doxycycline   Completed antibiotic therapy   Using albuterol prn, has nebulizer but need inhaler Rx    Compliant with lasix     Still with mild cough but feeling better     Due for lipid labs     Present with daughter today who assists in translation    Last visit Jan 2024 w PCP.      Review of Systems   Constitutional:  Negative for chills, diaphoresis and fever.   HENT:  Negative for congestion, rhinorrhea and sore throat.    Respiratory:  Positive for cough. Negative for shortness of breath and wheezing.    Gastrointestinal:  Negative for abdominal pain, constipation, diarrhea, nausea and vomiting.   Skin:  Negative for rash.   Neurological:  Negative for dizziness, light-headedness and headaches.       Objective:   /60   Pulse 86   Ht 5' 3" (1.6 m)   Wt 57.3 kg (126 lb 5.2 oz)   SpO2 96%   BMI 22.38 kg/m²   Physical Exam  Constitutional:       General: He is not in acute distress.     Appearance: Normal appearance. He is well-developed. He is not ill-appearing.   HENT:      Head: Normocephalic and atraumatic.   Cardiovascular:      Rate and Rhythm: Normal rate and regular rhythm.      Heart sounds: Normal heart sounds. No murmur heard.  Pulmonary:      Effort: Pulmonary effort is normal. No respiratory distress.      Breath sounds: Normal breath sounds. No decreased breath sounds.   Musculoskeletal:      Right lower leg: No edema.      Left lower leg: No edema.   Skin:     General: Skin is warm and dry.      Findings: No rash.   Psychiatric:         Speech: Speech normal.         " Behavior: Behavior normal.         Thought Content: Thought content normal.       Assessment:      1. Pneumonia of left lower lobe due to infectious organism    2. Paroxysmal atrial fibrillation    3. Chronic diastolic congestive heart failure    4. Atherosclerosis of aorta       Plan:   Pneumonia of left lower lobe due to infectious organism  -     CBC Auto Differential; Future; Expected date: 06/07/2024    Paroxysmal atrial fibrillation    Chronic diastolic congestive heart failure    Atherosclerosis of aorta  -     Lipid Panel; Future; Expected date: 06/07/2024  -     Comprehensive Metabolic Panel; Future; Expected date: 06/07/2024      Lungs clear on exam, symptoms improving, abx completed     Fasting labs     Keep schedule f/u w Card    6mth f/u PCP

## 2024-06-18 ENCOUNTER — OUTPATIENT CASE MANAGEMENT (OUTPATIENT)
Dept: ADMINISTRATIVE | Facility: OTHER | Age: 89
End: 2024-06-18
Payer: MEDICARE

## 2024-06-18 NOTE — LETTER
Long Hines  [id number]  [place] [address]      Dear Long Hines,     I am your nurse with [facility] Department. I was unsuccessful in reaching you today. At your earliest convenience, I would like to discuss your healthcare progress.      Please contact me at [phone] from 8:00AM to 4:30 PM on Monday thru Friday.     As you know, [facility] is a program offered to you through [facility] where a nurse is available 24/7 to answer questions or provide medical advice, their number is [phone].    Thanks,      Araceli Alexandra RN  Outpatient Care Management

## 2024-06-18 NOTE — PROGRESS NOTES
Outpatient Care Management  Plan of Care Follow Up Visit    Patient: Long Albarran  MRN: [id number]  Date of Service: 06/18/2024  Completed by: Araceli Alexandra RN  Referral Date: 01/22/2024    Reason for Visit   Patient presents with    [facility] RN Follow Up Call    Case Closure       Brief Summary: Phone contact today with daughter and primary caregiver, Marino for follow up and d/c of Mr Hines from [facility]. Encouraged her to assist him to seek treatment from the appropriate provider for any symptoms causing concern in the future and she voiced understanding. She also agreed with plan for d/c from [facility].   Next Steps: D/C from [facility] today. Araceli Alexandra RN

## 2024-07-31 ENCOUNTER — OFFICE VISIT (OUTPATIENT)
Dept: OPHTHALMOLOGY | Facility: CLINIC | Age: 89
End: 2024-07-31
Payer: MEDICARE

## 2024-07-31 DIAGNOSIS — H04.123 DRY EYES, BILATERAL: ICD-10-CM

## 2024-07-31 DIAGNOSIS — H11.003 PTERYGIUM OF BOTH EYES: ICD-10-CM

## 2024-07-31 DIAGNOSIS — H17.9 LEFT CORNEAL SCAR WITH OPACITY: ICD-10-CM

## 2024-07-31 DIAGNOSIS — H17.9 CORNEAL OPACITY: Primary | ICD-10-CM

## 2024-07-31 PROCEDURE — 99214 OFFICE O/P EST MOD 30 MIN: CPT | Mod: HCNC,S$GLB,, | Performed by: OPHTHALMOLOGY

## 2024-07-31 PROCEDURE — 99999 PR PBB SHADOW E&M-EST. PATIENT-LVL II: CPT | Mod: PBBFAC,HCNC,, | Performed by: OPHTHALMOLOGY

## 2024-07-31 PROCEDURE — 1159F MED LIST DOCD IN RCRD: CPT | Mod: HCNC,CPTII,S$GLB, | Performed by: OPHTHALMOLOGY

## 2024-07-31 NOTE — PROGRESS NOTES
HPI     Eye Problem            Comments: Pt referred by Dr. Mathis for left corneal scar opacity: Pt is   accompanied by his daughter, Marino Hines.    She states he has a white discharge both eyes x 1 year off and on. Eyes   itch and tear often.    States the vision in the left eye is more blurry than right eye x 1 year .    Pt states new pair of eyeglasses are not comfortable. (Explained the   optical can adjust them for him)          Comments    1. PCIOL 2021- OU  2. K opacity OS  3. Covid Jan 21, 2024. Hospital for almost 2 weeks.             Last edited by Yolis Carter on 7/31/2024  2:49 PM.            Assessment /Plan     For exam results, see Encounter Report.      ICD-10-CM ICD-9-CM    1. Corneal opacity  H17.9 371.00 Cornea scarring -   Discussed with pt and family   Needs K eval and possible PKP   Consult Cornea   Pt desires to think this over and wait for eval at this time   IOP stable, last dilation was with Dr Mathis and wnl   No active infection present, discussed  this with family         2. Left corneal scar with opacity  H17.9 371.00 Follow       3. Dry eyes, bilateral  H04.123 375.15 Follow       4. Pterygium   - both eyes - see above       Refer to Cornea Specialist  for Eval

## 2024-07-31 NOTE — Clinical Note
Please refer to Dr Joe Hines at [facility] - if insurance is not accepted they may need  to see [facility]

## 2024-08-05 RX ORDER — FUROSEMIDE 20 MG/1
40 TABLET ORAL DAILY
Qty: 60 TABLET | Refills: 5 | Status: SHIPPED | OUTPATIENT
Start: 2024-08-05

## 2024-08-25 DIAGNOSIS — M25.511 PAIN OF BOTH SHOULDER JOINTS: ICD-10-CM

## 2024-08-25 DIAGNOSIS — M25.512 PAIN OF BOTH SHOULDER JOINTS: ICD-10-CM

## 2024-08-25 DIAGNOSIS — R06.02 SHORTNESS OF BREATH: ICD-10-CM

## 2024-08-26 RX ORDER — ALBUTEROL SULFATE 0.63 MG/3ML
0.63 SOLUTION RESPIRATORY (INHALATION) EVERY 6 HOURS PRN
Qty: 75 ML | Refills: 5 | Status: SHIPPED | OUTPATIENT
Start: 2024-08-26

## 2024-08-26 NOTE — TELEPHONE ENCOUNTER
Refill Routing Note   Medication(s) are not appropriate for processing by [facility] for the following reason(s):        Outside of protocol    [facility] action(s):  Approve  Route        Medication Therapy Plan: ED visit for unrelated dx. FOVS. Will approve 6ms on albuterol and route meloxicam      Appointments  past 12m or future 3m with PCP    Date Provider   Last Visit   1/31/2024 Penny Watson DO   Next Visit   12/9/2024 Penny Watson DO   ED visits in past 90 days: 0        Note composed:1:44 PM 08/26/2024

## 2024-08-26 NOTE — TELEPHONE ENCOUNTER
No care due was identified.  [facility] Embedded Care Due Messages. Reference number: [id number].   8/25/2024 9:42:58 PM CDT

## 2024-08-27 RX ORDER — MELOXICAM 15 MG/1
15 TABLET ORAL DAILY PRN
Qty: 30 TABLET | Refills: 5 | Status: SHIPPED | OUTPATIENT
Start: 2024-08-27

## 2024-08-30 ENCOUNTER — TELEPHONE (OUTPATIENT)
Dept: INTERNAL MEDICINE | Facility: CLINIC | Age: 89
End: 2024-08-30
Payer: MEDICARE

## 2024-08-30 RX ORDER — LIDOCAINE 50 MG/G
1 PATCH TOPICAL
COMMUNITY
Start: 2024-08-19 | End: 2024-09-18

## 2024-08-30 NOTE — TELEPHONE ENCOUNTER
----- Message from Daphne Orjulian sent at 8/30/2024 11:44 AM CDT -----  Contact: Son [phone]  Would like to receive medical advice.    Would they like a call back or a response via [facility]:  call back     Additional information:  Pt's son is calling because he was prescribed Lidocaine 5% for back pain at [facility].  He was discharged on 08/19/2024.  The medication needs prior authorization. So he was told to contact Dr. Watson.  Please call to advise.        [id number] - [place] - [address] [facility] [place] [address] [place] [address]  Phone: [phone] Fax: [phone]

## 2024-08-30 NOTE — TELEPHONE ENCOUNTER
----- Message from Daphne Orjulian sent at 8/30/2024 11:44 AM CDT -----  Contact: Son [phone]  Would like to receive medical advice.    Would they like a call back or a response via [facility]:  call back     Additional information:  Pt's son is calling because he was prescribed Lidocaine 5% for back pain at [facility].  He was discharged on 08/19/2024.  The medication needs prior authorization. So he was told to contact Dr. Watson.  Please call to advise.        [id number] - [place] - [address] [facility] [address] [place] [address] [place] [address]  Phone: [phone] Fax: [phone]

## 2024-09-03 ENCOUNTER — OFFICE VISIT (OUTPATIENT)
Dept: INTERNAL MEDICINE | Facility: CLINIC | Age: 89
End: 2024-09-03
Payer: MEDICARE

## 2024-09-03 ENCOUNTER — HOSPITAL ENCOUNTER (OUTPATIENT)
Dept: RADIOLOGY | Facility: HOSPITAL | Age: 89
Discharge: HOME | End: 2024-09-03
Attending: PHYSICIAN ASSISTANT
Payer: MEDICARE

## 2024-09-03 VITALS
BODY MASS INDEX: 22.73 KG/M2 | TEMPERATURE: 99 F | WEIGHT: 128.31 LBS | SYSTOLIC BLOOD PRESSURE: 118 MMHG | HEART RATE: 50 BPM | DIASTOLIC BLOOD PRESSURE: 68 MMHG | OXYGEN SATURATION: 96 % | RESPIRATION RATE: 21 BRPM

## 2024-09-03 DIAGNOSIS — R10.9 ABDOMINAL PAIN, UNSPECIFIED ABDOMINAL LOCATION: ICD-10-CM

## 2024-09-03 DIAGNOSIS — M54.50 ACUTE MIDLINE LOW BACK PAIN WITHOUT SCIATICA: Primary | ICD-10-CM

## 2024-09-03 LAB
BILIRUB UR QL STRIP: NEGATIVE
CLARITY UR: CLEAR
COLOR UR: YELLOW
GLUCOSE UR QL STRIP: NEGATIVE
HGB UR QL STRIP: NEGATIVE
KETONES UR QL STRIP: NEGATIVE
LEUKOCYTE ESTERASE UR QL STRIP: NEGATIVE
NITRITE UR QL STRIP: NEGATIVE
PH UR STRIP: 6 [PH] (ref 5–8)
PROT UR QL STRIP: NEGATIVE
SP GR UR STRIP: >=1.03 (ref 1–1.03)
URN SPEC COLLECT METH UR: ABNORMAL
UROBILINOGEN UR STRIP-ACNC: NEGATIVE EU/DL

## 2024-09-03 PROCEDURE — 74019 RADEX ABDOMEN 2 VIEWS: CPT | Mod: TC,HCNC

## 2024-09-03 PROCEDURE — 74019 RADEX ABDOMEN 2 VIEWS: CPT | Mod: 26,HCNC,, | Performed by: RADIOLOGY

## 2024-09-03 PROCEDURE — 1125F AMNT PAIN NOTED PAIN PRSNT: CPT | Mod: HCNC,CPTII,S$GLB, | Performed by: PHYSICIAN ASSISTANT

## 2024-09-03 PROCEDURE — 1160F RVW MEDS BY RX/DR IN RCRD: CPT | Mod: HCNC,CPTII,S$GLB, | Performed by: PHYSICIAN ASSISTANT

## 2024-09-03 PROCEDURE — 1159F MED LIST DOCD IN RCRD: CPT | Mod: HCNC,CPTII,S$GLB, | Performed by: PHYSICIAN ASSISTANT

## 2024-09-03 PROCEDURE — 99214 OFFICE O/P EST MOD 30 MIN: CPT | Mod: HCNC,S$GLB,, | Performed by: PHYSICIAN ASSISTANT

## 2024-09-03 PROCEDURE — 99999 PR PBB SHADOW E&M-EST. PATIENT-LVL IV: CPT | Mod: PBBFAC,HCNC,, | Performed by: PHYSICIAN ASSISTANT

## 2024-09-03 PROCEDURE — 81003 URINALYSIS AUTO W/O SCOPE: CPT | Mod: HCNC | Performed by: PHYSICIAN ASSISTANT

## 2024-09-03 RX ORDER — IPRATROPIUM BROMIDE AND ALBUTEROL SULFATE 2.5; .5 MG/3ML; MG/3ML
SOLUTION RESPIRATORY (INHALATION)
COMMUNITY
Start: 2024-06-01

## 2024-09-03 RX ORDER — FLUTICASONE FUROATE AND VILANTEROL TRIFENATATE 200; 25 UG/1; UG/1
1 POWDER RESPIRATORY (INHALATION)
COMMUNITY
Start: 2024-06-01

## 2024-09-03 NOTE — PROGRESS NOTES
Subjective:      Patient ID: Long Albarran is a 89 y.o. male.    Chief Complaint: Follow-up (He is here for a follow up from hospital visit on 8/19. Daughter states she would like repeat testing done to see if there has been any change since ED visit. Pt is still experiencing pain. )    Patient is known to me, being seen today for ER f/u.     Evaluated in [facility] ER on 8/19 for acute R sided low back pain   CT lumbar shows disc protrusion L5-S1, spinal stenosis   CXR clear   BNP and troponin elevated, EKG stable, has appt w Card next week   Prescribed lidocaine patches, unable to  d/t need for PA  Also takes meloxicam 15mg as prescribed by PCP   Also taking robaxin   Medication provide temporary relief   Denies fall/trauma     Pain in lower back x2wks, worse when moving around or when coughing   Rates 5/10, slight improvement since it started   Pain does not radiate to legs, denies numbness/tingling in legs     Present w daughter who assists in history, also present on phone is other daughter     Last visit June 2024 w myself.       Review of Systems   Constitutional:  Negative for chills, diaphoresis and fever.   HENT:  Negative for congestion, rhinorrhea and sore throat.    Respiratory:  Negative for cough, shortness of breath and wheezing.    Cardiovascular:  Positive for chest pain (intermittent, followed by Card). Negative for palpitations.   Gastrointestinal:  Positive for abdominal pain (radiates from back) and constipation. Negative for blood in stool, diarrhea, nausea and vomiting.        Last BM, hard stool   Musculoskeletal:  Positive for back pain.   Skin:  Negative for rash.   Neurological:  Negative for dizziness, light-headedness, numbness and headaches.       Objective:   /68 (BP Location: Left arm, Patient Position: Sitting, BP Method: Medium (Manual))   Pulse (!) 50   Temp 98.5 °F (36.9 °C) (Tympanic)   Resp (!) 21   Wt 58.2 kg (128 lb 4.9 oz)   SpO2 96%   BMI 22.73 kg/m²    Physical Exam  Constitutional:       General: He is not in acute distress.     Appearance: Normal appearance. He is well-developed. He is not ill-appearing.   HENT:      Head: Normocephalic and atraumatic.   Cardiovascular:      Rate and Rhythm: Normal rate and regular rhythm.      Heart sounds: Normal heart sounds. No murmur heard.  Pulmonary:      Effort: Pulmonary effort is normal. No respiratory distress.      Breath sounds: Normal breath sounds. No decreased breath sounds.   Abdominal:      Palpations: Abdomen is soft.      Tenderness: There is no abdominal tenderness.   Musculoskeletal:      Lumbar back: No tenderness or bony tenderness.      Right lower leg: No edema.      Left lower leg: No edema.   Skin:     General: Skin is warm and dry.      Findings: No rash.   Psychiatric:         Speech: Speech normal.         Behavior: Behavior normal.         Thought Content: Thought content normal.       Assessment:      1. Acute midline low back pain without sciatica    2. Abdominal pain, unspecified abdominal location       Plan:   Acute midline low back pain without sciatica  -     Ambulatory referral/consult to Pain Clinic; Future; Expected date: 09/10/2024    Abdominal pain, unspecified abdominal location  -     X-Ray Abdomen Flat And Erect; Future; Expected date: 09/03/2024  -     Urinalysis, Reflex to Urine Culture Urine, Clean Catch        F/u Pain Med for low back pain w degenerative changes and stenosis on CT     Keep upcoming Card f/u     Discussed worsening signs/symptoms and when to return to clinic or go to ED.   Patient expresses understanding and agrees with treatment plan.

## 2024-09-16 ENCOUNTER — OFFICE VISIT (OUTPATIENT)
Dept: CARDIOLOGY | Facility: CLINIC | Age: 89
End: 2024-09-16
Payer: MEDICARE

## 2024-09-16 VITALS
BODY MASS INDEX: 22.76 KG/M2 | OXYGEN SATURATION: 96 % | SYSTOLIC BLOOD PRESSURE: 115 MMHG | DIASTOLIC BLOOD PRESSURE: 57 MMHG | WEIGHT: 128.44 LBS | RESPIRATION RATE: 16 BRPM | HEART RATE: 41 BPM | HEIGHT: 63 IN

## 2024-09-16 DIAGNOSIS — I51.89 DIASTOLIC DYSFUNCTION: ICD-10-CM

## 2024-09-16 DIAGNOSIS — I48.0 PAROXYSMAL ATRIAL FIBRILLATION: ICD-10-CM

## 2024-09-16 DIAGNOSIS — I70.0 ATHEROSCLEROSIS OF AORTA: Primary | ICD-10-CM

## 2024-09-16 DIAGNOSIS — I27.9 PULMONARY HEART DISEASE: ICD-10-CM

## 2024-09-16 DIAGNOSIS — I50.32 CHRONIC DIASTOLIC CONGESTIVE HEART FAILURE: ICD-10-CM

## 2024-09-16 DIAGNOSIS — R00.1 BRADYCARDIA: ICD-10-CM

## 2024-09-16 PROCEDURE — 1101F PT FALLS ASSESS-DOCD LE1/YR: CPT | Mod: HCNC,CPTII,S$GLB, | Performed by: INTERNAL MEDICINE

## 2024-09-16 PROCEDURE — 99214 OFFICE O/P EST MOD 30 MIN: CPT | Mod: HCNC,S$GLB,, | Performed by: INTERNAL MEDICINE

## 2024-09-16 PROCEDURE — 3288F FALL RISK ASSESSMENT DOCD: CPT | Mod: HCNC,CPTII,S$GLB, | Performed by: INTERNAL MEDICINE

## 2024-09-16 PROCEDURE — 93005 ELECTROCARDIOGRAM TRACING: CPT | Mod: HCNC

## 2024-09-16 PROCEDURE — 1159F MED LIST DOCD IN RCRD: CPT | Mod: HCNC,CPTII,S$GLB, | Performed by: INTERNAL MEDICINE

## 2024-09-16 PROCEDURE — 93010 ELECTROCARDIOGRAM REPORT: CPT | Mod: HCNC,S$GLB,, | Performed by: INTERNAL MEDICINE

## 2024-09-16 PROCEDURE — 1160F RVW MEDS BY RX/DR IN RCRD: CPT | Mod: HCNC,CPTII,S$GLB, | Performed by: INTERNAL MEDICINE

## 2024-09-16 PROCEDURE — 99999 PR PBB SHADOW E&M-EST. PATIENT-LVL IV: CPT | Mod: PBBFAC,HCNC,, | Performed by: INTERNAL MEDICINE

## 2024-09-16 NOTE — PROGRESS NOTES
Subjective:   Patient ID:  Long Albarran is a 89 y.o. male who presents for follow-up of No chief complaint on file.  Patient denies CP, angina or anginal equivalent.No dizziness  Cardiac monitor:  The patient was monitored for a total of 13d 13h, underlying rhythm is Atrial fibrillation.  The minimum heart rate was 38 bpm; the maximum 141 bpm; the average 65 bpm.  99.88 % of Atrial fibrillation/Atrial flutter with longest episode of 3d 5h.  Atrial Fibrillation  Presents for follow-up visit. Symptoms are negative for bradycardia, chest pain, dizziness, palpitations, shortness of breath, syncope, tachycardia and weakness. The symptoms have been stable. Past medical history includes atrial fibrillation and CHF. There are no medication compliance problems.   Congestive Heart Failure  Presents for follow-up visit. Pertinent negatives include no chest pain, chest pressure, claudication, edema, fatigue, muscle weakness, orthopnea, palpitations, paroxysmal nocturnal dyspnea, shortness of breath or unexpected weight change. The symptoms have been stable. Compliance with total regimen is %. Compliance with diet is %. Compliance with exercise is %. Compliance with medications is %.       Review of Systems   Constitutional: Negative. Negative for fatigue, unexpected weight change and weight gain.   HENT: Negative.     Eyes: Negative.    Cardiovascular: Negative.  Negative for chest pain, claudication, leg swelling, palpitations and syncope.   Respiratory:  Negative for shortness of breath.    Endocrine: Negative.    Hematologic/Lymphatic: Negative.    Skin: Negative.    Musculoskeletal:  Negative for muscle weakness.   Gastrointestinal: Negative.    Genitourinary: Negative.    Neurological: Negative.  Negative for dizziness and weakness.   Psychiatric/Behavioral: Negative.     Allergic/Immunologic: Negative.    All other systems reviewed and are negative.    Family History   Problem Relation Name Age  of Onset    Cancer Neg Hx      Diabetes Neg Hx      Heart disease Neg Hx      Hypertension Neg Hx      Kidney disease Neg Hx      Stroke Neg Hx       Past Medical History:   Diagnosis Date    A-fib     Diastolic dysfunction 2017    Hypertriglyceridemia      Social History     Socioeconomic History    Marital status: Single   Tobacco Use    Smoking status: Former     Current packs/day: 0.00     Average packs/day: 1 pack/day for 61.0 years (61.0 ttl pk-yrs)     Types: Cigarettes     Start date:      Quit date:      Years since quittin.7     Passive exposure: Past    Smokeless tobacco: Never   Substance and Sexual Activity    Alcohol use: No     Alcohol/week: 0.0 standard drinks of alcohol    Drug use: No    Sexual activity: Not Currently     Social Determinants of Health     Financial Resource Strain: Medium Risk (2024)    Overall Financial Resource Strain (CARDIA)     Difficulty of Paying Living Expenses: Somewhat hard   Food Insecurity: Food Insecurity Present (2024)    Hunger Vital Sign     Worried About Running Out of Food in the Last Year: Sometimes true     Ran Out of Food in the Last Year: Sometimes true   Transportation Needs: No Transportation Needs (2024)    Received from [facility]    PRAPARE - Transportation     Lack of Transportation (Medical): No     Lack of Transportation (Non-Medical): No   Physical Activity: Insufficiently Active (2024)    Exercise Vital Sign     Days of Exercise per Week: 4 days     Minutes of Exercise per Session: 30 min   Stress: No Stress Concern Present (2024)    [employer] - Occupational Stress Questionnaire     Feeling of Stress : Not at all   Housing Stability: Low Risk  (2024)    Housing Stability Vital Sign     Unable to Pay for Housing in the Last Year: No     Number of Places Lived in the Last Year: 1     Unstable Housing in  the Last Year: No     Current Outpatient Medications on File Prior to Visit   Medication Sig Dispense Refill    albuterol (ACCUNEB) 0.63 mg/3 mL Nebu Take 3 mLs (0.63 mg total) by nebulization every 6 (six) hours as needed (wheezing or shortness of breath). 75 mL 5    albuterol-ipratropium (DUO-NEB) 2.5 mg-0.5 mg/3 mL nebulizer solution Take by nebulization as needed for Wheezing.      BREO ELLIPTA 200-25 mcg/dose DsDv diskus inhaler Inhale 1 puff into the lungs as needed.      furosemide (LASIX) 20 MG tablet Take 2 tablets (40 mg total) by mouth once daily. 60 tablet 5    meloxicam (MOBIC) 15 MG tablet Take 1 tablet (15 mg total) by mouth daily as needed for Pain. 30 tablet 5    cyproheptadine (PERIACTIN) 4 mg tablet Take 1 tablet (4 mg total) by mouth 2 (two) times daily as needed (decreased appetite). (Patient not taking: Reported on 6/7/2024) 60 tablet 2    FLUAD QUAD 2023-24,65Y UP,,PF, 60 mcg (15 mcg x 4)/0.5 mL Syrg  (Patient not taking: Reported on 9/3/2024)       Current Facility-Administered Medications on File Prior to Visit   Medication Dose Route Frequency Provider Last Rate Last Admin    aspirin chewable tablet 81 mg  81 mg Oral Daily Herbert Guzman MD         Review of patient's allergies indicates:  No Known Allergies    Objective:     Physical Exam  Vitals and nursing note reviewed.   Constitutional:       Appearance: He is well-developed.   HENT:      Head: Normocephalic and atraumatic.   Eyes:      Conjunctiva/sclera: Conjunctivae normal.      Pupils: Pupils are equal, round, and reactive to light.   Cardiovascular:      Rate and Rhythm: Normal rate. Rhythm irregular.      Pulses: Intact distal pulses.      Heart sounds: Normal heart sounds.   Pulmonary:      Effort: Pulmonary effort is normal.      Breath sounds: Normal breath sounds.   Abdominal:      General: Bowel sounds are normal.      Palpations: Abdomen is soft.   Musculoskeletal:      Cervical back: Normal range of motion and neck  supple.   Skin:     General: Skin is warm and dry.   Neurological:      Mental Status: He is alert and oriented to person, place, and time.         Assessment:     1. Atherosclerosis of aorta    2. Paroxysmal atrial fibrillation    3. Bradycardia    4. Chronic diastolic congestive heart failure    5. Diastolic dysfunction    6. Pulmonary heart disease        Plan:     Atherosclerosis of aorta    Paroxysmal atrial fibrillation    Bradycardia    Chronic diastolic congestive heart failure    Diastolic dysfunction    Pulmonary heart disease        Continue lasix- CHF  Continue asa- afib

## 2024-09-17 LAB
OHS QRS DURATION: 136 MS
OHS QTC CALCULATION: 469 MS

## 2024-11-06 ENCOUNTER — TELEPHONE (OUTPATIENT)
Dept: PAIN MEDICINE | Facility: CLINIC | Age: 89
End: 2024-11-06
Payer: MEDICARE

## 2024-12-12 ENCOUNTER — OFFICE VISIT (OUTPATIENT)
Dept: INTERNAL MEDICINE | Facility: CLINIC | Age: 89
End: 2024-12-12
Payer: MEDICARE

## 2024-12-12 ENCOUNTER — LAB VISIT (OUTPATIENT)
Dept: LAB | Facility: HOSPITAL | Age: 89
End: 2024-12-12
Attending: PHYSICIAN ASSISTANT
Payer: MEDICARE

## 2024-12-12 VITALS
HEART RATE: 45 BPM | TEMPERATURE: 96 F | DIASTOLIC BLOOD PRESSURE: 58 MMHG | OXYGEN SATURATION: 98 % | RESPIRATION RATE: 20 BRPM | BODY MASS INDEX: 21.64 KG/M2 | WEIGHT: 122.13 LBS | SYSTOLIC BLOOD PRESSURE: 114 MMHG

## 2024-12-12 DIAGNOSIS — J18.9 PNEUMONIA OF LEFT LOWER LOBE DUE TO INFECTIOUS ORGANISM: ICD-10-CM

## 2024-12-12 DIAGNOSIS — I70.0 ATHEROSCLEROSIS OF AORTA: ICD-10-CM

## 2024-12-12 DIAGNOSIS — R32 URINARY INCONTINENCE, UNSPECIFIED TYPE: ICD-10-CM

## 2024-12-12 DIAGNOSIS — Z23 NEED FOR VACCINATION: ICD-10-CM

## 2024-12-12 DIAGNOSIS — I50.32 CHRONIC DIASTOLIC CONGESTIVE HEART FAILURE: Primary | ICD-10-CM

## 2024-12-12 DIAGNOSIS — I51.89 DIASTOLIC DYSFUNCTION: ICD-10-CM

## 2024-12-12 DIAGNOSIS — I48.0 PAROXYSMAL ATRIAL FIBRILLATION: ICD-10-CM

## 2024-12-12 LAB
ALBUMIN SERPL BCP-MCNC: 3.9 G/DL (ref 3.5–5.2)
ALP SERPL-CCNC: 110 U/L (ref 40–150)
ALT SERPL W/O P-5'-P-CCNC: 15 U/L (ref 10–44)
ANION GAP SERPL CALC-SCNC: 9 MMOL/L (ref 8–16)
AST SERPL-CCNC: 25 U/L (ref 10–40)
BASOPHILS # BLD AUTO: 0.05 K/UL (ref 0–0.2)
BASOPHILS NFR BLD: 1 % (ref 0–1.9)
BILIRUB SERPL-MCNC: 0.6 MG/DL (ref 0.1–1)
BUN SERPL-MCNC: 21 MG/DL (ref 10–30)
CALCIUM SERPL-MCNC: 8.9 MG/DL (ref 8.7–10.5)
CHLORIDE SERPL-SCNC: 109 MMOL/L (ref 95–110)
CHOLEST SERPL-MCNC: 193 MG/DL (ref 120–199)
CHOLEST/HDLC SERPL: 4.5 {RATIO} (ref 2–5)
CO2 SERPL-SCNC: 23 MMOL/L (ref 23–29)
CREAT SERPL-MCNC: 0.8 MG/DL (ref 0.5–1.4)
DIFFERENTIAL METHOD BLD: NORMAL
EOSINOPHIL # BLD AUTO: 0.2 K/UL (ref 0–0.5)
EOSINOPHIL NFR BLD: 3.7 % (ref 0–8)
ERYTHROCYTE [DISTWIDTH] IN BLOOD BY AUTOMATED COUNT: 12.8 % (ref 11.5–14.5)
EST. GFR  (NO RACE VARIABLE): >60 ML/MIN/1.73 M^2
GLUCOSE SERPL-MCNC: 83 MG/DL (ref 70–110)
HCT VFR BLD AUTO: 43 % (ref 40–54)
HDLC SERPL-MCNC: 43 MG/DL (ref 40–75)
HDLC SERPL: 22.3 % (ref 20–50)
HGB BLD-MCNC: 14.7 G/DL (ref 14–18)
IMM GRANULOCYTES # BLD AUTO: 0.02 K/UL (ref 0–0.04)
IMM GRANULOCYTES NFR BLD AUTO: 0.4 % (ref 0–0.5)
LDLC SERPL CALC-MCNC: 112.6 MG/DL (ref 63–159)
LYMPHOCYTES # BLD AUTO: 1.4 K/UL (ref 1–4.8)
LYMPHOCYTES NFR BLD: 28 % (ref 18–48)
MCH RBC QN AUTO: 29.6 PG (ref 27–31)
MCHC RBC AUTO-ENTMCNC: 34.2 G/DL (ref 32–36)
MCV RBC AUTO: 87 FL (ref 82–98)
MONOCYTES # BLD AUTO: 0.5 K/UL (ref 0.3–1)
MONOCYTES NFR BLD: 9.3 % (ref 4–15)
NEUTROPHILS # BLD AUTO: 3 K/UL (ref 1.8–7.7)
NEUTROPHILS NFR BLD: 57.6 % (ref 38–73)
NONHDLC SERPL-MCNC: 150 MG/DL
NRBC BLD-RTO: 0 /100 WBC
PLATELET # BLD AUTO: 156 K/UL (ref 150–450)
PMV BLD AUTO: 10.6 FL (ref 9.2–12.9)
POTASSIUM SERPL-SCNC: 4.6 MMOL/L (ref 3.5–5.1)
PROT SERPL-MCNC: 7.5 G/DL (ref 6–8.4)
RBC # BLD AUTO: 4.97 M/UL (ref 4.6–6.2)
SODIUM SERPL-SCNC: 141 MMOL/L (ref 136–145)
TRIGL SERPL-MCNC: 187 MG/DL (ref 30–150)
WBC # BLD AUTO: 5.15 K/UL (ref 3.9–12.7)

## 2024-12-12 PROCEDURE — 80053 COMPREHEN METABOLIC PANEL: CPT | Mod: HCNC | Performed by: PHYSICIAN ASSISTANT

## 2024-12-12 PROCEDURE — 85025 COMPLETE CBC W/AUTO DIFF WBC: CPT | Mod: HCNC | Performed by: PHYSICIAN ASSISTANT

## 2024-12-12 PROCEDURE — 99999 PR PBB SHADOW E&M-EST. PATIENT-LVL IV: CPT | Mod: PBBFAC,HCNC,, | Performed by: PHYSICIAN ASSISTANT

## 2024-12-12 PROCEDURE — 80061 LIPID PANEL: CPT | Mod: HCNC | Performed by: PHYSICIAN ASSISTANT

## 2024-12-12 PROCEDURE — 36415 COLL VENOUS BLD VENIPUNCTURE: CPT | Mod: HCNC | Performed by: PHYSICIAN ASSISTANT

## 2024-12-12 NOTE — PROGRESS NOTES
"Subjective:      Patient ID: Long Albarran is a 89 y.o. male.    Chief Complaint: Follow-up (He is here for a 6 month follow up. States he has occasional pain in left side from where he had previous surgery. )    Patient is known to me, being seen today for follow up.     Taking lasix 40mg daily     Due for routine labs    Ongoing intermittent L side pain that radiates to abdomen, discussed in Sept w myself   This has been ongoing since abdominal surgery many years ago (unclear what type of surgery, daughter mentions removal of "something", med history says he has had appendectomy in [place] 3yrs ago)  Abdominal x-ray unremarkable   Evaluated in ER 11/23- urine and labs unremarkable for cause  "CT Chest with Contrast   Final Result   1. Diffuse demineralization with acute wedge compression fracture of T9 without retropulsion as well as acute T8 spinous process fracture.   2. Background of chronic wedge compression deformities of the thoracic spine as described with exaggerated kyphosis.   3. Small bilateral pleural effusions without lobar consolidation.   4. Ancillary findings as described.     CT Angiogram Abdomen   Final Result   1. No CTA evidence of significant stenosis or large vessel occlusion to suggest mesenteric ischemia.   2. No CT evidence of acute abdominopelvic abnormality.   3. Ancillary findings as described"    Also has CT abdomen/pelvis in May "Abdomen: The liver, spleen, adrenals, kidneys, and pancreas are within normal limits. No ascites or adenopathy. The abdominal aorta is nonaneurysmal."    Also with chronic low back pain, referred to Pain Med, no f/u on file, denies issues w back currently     Episodes of urinary incontinence, leaking not full urination  Often assc with washing hands/using water     Present w daughter who assists in history, also present on phone is other daughter     Last visit Sept 2024 w myself.       Review of Systems   Constitutional:  Negative for chills, diaphoresis " and fever.   HENT:  Negative for congestion, rhinorrhea and sore throat.    Respiratory:  Negative for cough, shortness of breath and wheezing.    Gastrointestinal:  Positive for abdominal pain (L lower). Negative for blood in stool, constipation, diarrhea, nausea and vomiting.   Genitourinary:         Ongoing urinary incontinence    Skin:  Negative for rash.   Neurological:  Negative for dizziness, light-headedness and headaches.       Objective:   BP (!) 114/58 (BP Location: Right arm, Patient Position: Sitting)   Pulse (!) 45   Temp 96.3 °F (35.7 °C) (Tympanic)   Resp 20   Wt 55.4 kg (122 lb 2.2 oz)   SpO2 98%   BMI 21.64 kg/m²   Physical Exam  Constitutional:       General: He is not in acute distress.     Appearance: Normal appearance. He is well-developed. He is not ill-appearing.   HENT:      Head: Normocephalic and atraumatic.   Cardiovascular:      Rate and Rhythm: Normal rate and regular rhythm.      Heart sounds: Normal heart sounds. No murmur heard.  Pulmonary:      Effort: Pulmonary effort is normal. No respiratory distress.      Breath sounds: Normal breath sounds. No decreased breath sounds.   Abdominal:      Palpations: Abdomen is soft.      Tenderness: There is no abdominal tenderness.   Musculoskeletal:      Right lower leg: No edema.      Left lower leg: No edema.   Skin:     General: Skin is warm and dry.      Findings: No rash.   Psychiatric:         Speech: Speech normal.         Behavior: Behavior normal.         Thought Content: Thought content normal.       Assessment:      1. Chronic diastolic congestive heart failure    2. Paroxysmal atrial fibrillation    3. Diastolic dysfunction    4. Urinary incontinence, unspecified type       Plan:   Chronic diastolic congestive heart failure    Paroxysmal atrial fibrillation    Diastolic dysfunction    Urinary incontinence, unspecified type  -     Ambulatory referral/consult to Urology; Future; Expected date: 12/19/2024      W/u unremarkable  for abdominal pain, no constipation/diarrhea/NV  Possibly scar tissue from previous surgery  Recommend warm compresses when bothersome     Fasting labs     6mth f/u annual PCP

## 2025-01-14 DIAGNOSIS — Z00.00 ENCOUNTER FOR MEDICARE ANNUAL WELLNESS EXAM: ICD-10-CM

## 2025-02-06 ENCOUNTER — HOSPITAL ENCOUNTER (INPATIENT)
Facility: HOSPITAL | Age: OVER 89
LOS: 2 days | Discharge: HOME | DRG: 309 | End: 2025-02-08
Attending: EMERGENCY MEDICINE | Admitting: SPECIALIST
Payer: MEDICARE

## 2025-02-06 ENCOUNTER — OFFICE VISIT (OUTPATIENT)
Dept: UROLOGY | Facility: CLINIC | Age: OVER 89
End: 2025-02-06
Payer: MEDICARE

## 2025-02-06 VITALS
HEIGHT: 63 IN | BODY MASS INDEX: 21.64 KG/M2 | DIASTOLIC BLOOD PRESSURE: 45 MMHG | HEART RATE: 45 BPM | SYSTOLIC BLOOD PRESSURE: 101 MMHG | WEIGHT: 122.13 LBS

## 2025-02-06 DIAGNOSIS — I50.9 CHF (CONGESTIVE HEART FAILURE): ICD-10-CM

## 2025-02-06 DIAGNOSIS — S22.000A CLOSED COMPRESSION FRACTURE OF THORACIC VERTEBRA, INITIAL ENCOUNTER: ICD-10-CM

## 2025-02-06 DIAGNOSIS — I10 HYPERTENSION, UNSPECIFIED TYPE: ICD-10-CM

## 2025-02-06 DIAGNOSIS — I50.9 CONGESTIVE HEART FAILURE, UNSPECIFIED HF CHRONICITY, UNSPECIFIED HEART FAILURE TYPE: ICD-10-CM

## 2025-02-06 DIAGNOSIS — R07.9 CHEST PAIN: ICD-10-CM

## 2025-02-06 DIAGNOSIS — R00.1 BRADYCARDIA: Primary | ICD-10-CM

## 2025-02-06 DIAGNOSIS — I21.4 NSTEMI (NON-ST ELEVATED MYOCARDIAL INFARCTION): ICD-10-CM

## 2025-02-06 DIAGNOSIS — I48.91 A-FIB: ICD-10-CM

## 2025-02-06 DIAGNOSIS — N39.41 URGE INCONTINENCE OF URINE: ICD-10-CM

## 2025-02-06 LAB
ALBUMIN SERPL BCP-MCNC: 3.8 G/DL (ref 3.5–5.2)
ALP SERPL-CCNC: 103 U/L (ref 40–150)
ALT SERPL W/O P-5'-P-CCNC: 12 U/L (ref 10–44)
ANION GAP SERPL CALC-SCNC: 8 MMOL/L (ref 8–16)
AST SERPL-CCNC: 24 U/L (ref 10–40)
BASOPHILS # BLD AUTO: 0.04 K/UL (ref 0–0.2)
BASOPHILS NFR BLD: 0.8 % (ref 0–1.9)
BILIRUB SERPL-MCNC: 0.5 MG/DL (ref 0.1–1)
BILIRUB UR QL STRIP: NEGATIVE
BNP SERPL-MCNC: 597 PG/ML (ref 0–99)
BUN SERPL-MCNC: 19 MG/DL (ref 10–30)
CALCIUM SERPL-MCNC: 9.1 MG/DL (ref 8.7–10.5)
CHLORIDE SERPL-SCNC: 107 MMOL/L (ref 95–110)
CLARITY UR: CLEAR
CO2 SERPL-SCNC: 23 MMOL/L (ref 23–29)
COLOR UR: COLORLESS
CREAT SERPL-MCNC: 1 MG/DL (ref 0.5–1.4)
DIFFERENTIAL METHOD BLD: NORMAL
EOSINOPHIL # BLD AUTO: 0.2 K/UL (ref 0–0.5)
EOSINOPHIL NFR BLD: 3.2 % (ref 0–8)
ERYTHROCYTE [DISTWIDTH] IN BLOOD BY AUTOMATED COUNT: 12.9 % (ref 11.5–14.5)
EST. GFR  (NO RACE VARIABLE): >60 ML/MIN/1.73 M^2
GLUCOSE SERPL-MCNC: 104 MG/DL (ref 70–110)
GLUCOSE UR QL STRIP: NEGATIVE
HCT VFR BLD AUTO: 44 % (ref 40–54)
HGB BLD-MCNC: 15.2 G/DL (ref 14–18)
HGB UR QL STRIP: NEGATIVE
IMM GRANULOCYTES # BLD AUTO: 0.02 K/UL (ref 0–0.04)
IMM GRANULOCYTES NFR BLD AUTO: 0.4 % (ref 0–0.5)
KETONES UR QL STRIP: NEGATIVE
LEUKOCYTE ESTERASE UR QL STRIP: NEGATIVE
LIPASE SERPL-CCNC: 17 U/L (ref 4–60)
LYMPHOCYTES # BLD AUTO: 1.8 K/UL (ref 1–4.8)
LYMPHOCYTES NFR BLD: 35.3 % (ref 18–48)
MCH RBC QN AUTO: 30.4 PG (ref 27–31)
MCHC RBC AUTO-ENTMCNC: 34.5 G/DL (ref 32–36)
MCV RBC AUTO: 88 FL (ref 82–98)
MONOCYTES # BLD AUTO: 0.5 K/UL (ref 0.3–1)
MONOCYTES NFR BLD: 10.1 % (ref 4–15)
NEUTROPHILS # BLD AUTO: 2.5 K/UL (ref 1.8–7.7)
NEUTROPHILS NFR BLD: 50.2 % (ref 38–73)
NITRITE UR QL STRIP: NEGATIVE
NRBC BLD-RTO: 0 /100 WBC
OHS QRS DURATION: 134 MS
OHS QTC CALCULATION: 435 MS
PH UR STRIP: 7 [PH] (ref 5–8)
PLATELET # BLD AUTO: 164 K/UL (ref 150–450)
PMV BLD AUTO: 10.3 FL (ref 9.2–12.9)
POTASSIUM SERPL-SCNC: 4.3 MMOL/L (ref 3.5–5.1)
PROT SERPL-MCNC: 7.5 G/DL (ref 6–8.4)
PROT UR QL STRIP: NEGATIVE
RBC # BLD AUTO: 5 M/UL (ref 4.6–6.2)
SARS-COV-2 RDRP RESP QL NAA+PROBE: NEGATIVE
SODIUM SERPL-SCNC: 138 MMOL/L (ref 136–145)
SP GR UR STRIP: 1.01 (ref 1–1.03)
TROPONIN I SERPL DL<=0.01 NG/ML-MCNC: 0.06 NG/ML (ref 0–0.03)
TROPONIN I SERPL DL<=0.01 NG/ML-MCNC: 0.06 NG/ML (ref 0–0.03)
URN SPEC COLLECT METH UR: ABNORMAL
UROBILINOGEN UR STRIP-ACNC: NEGATIVE EU/DL
WBC # BLD AUTO: 5.04 K/UL (ref 3.9–12.7)

## 2025-02-06 PROCEDURE — 63600175 PHARM REV CODE 636 W HCPCS: Mod: HCNC | Performed by: EMERGENCY MEDICINE

## 2025-02-06 PROCEDURE — 96375 TX/PRO/DX INJ NEW DRUG ADDON: CPT | Mod: HCNC

## 2025-02-06 PROCEDURE — 1126F AMNT PAIN NOTED NONE PRSNT: CPT | Mod: HCNC,CPTII,S$GLB, | Performed by: UROLOGY

## 2025-02-06 PROCEDURE — 3288F FALL RISK ASSESSMENT DOCD: CPT | Mod: HCNC,CPTII,S$GLB, | Performed by: UROLOGY

## 2025-02-06 PROCEDURE — 80053 COMPREHEN METABOLIC PANEL: CPT | Mod: HCNC | Performed by: NURSE PRACTITIONER

## 2025-02-06 PROCEDURE — 99291 CRITICAL CARE FIRST HOUR: CPT | Mod: HCNC

## 2025-02-06 PROCEDURE — 1159F MED LIST DOCD IN RCRD: CPT | Mod: HCNC,CPTII,S$GLB, | Performed by: UROLOGY

## 2025-02-06 PROCEDURE — 25500020 PHARM REV CODE 255: Mod: HCNC | Performed by: EMERGENCY MEDICINE

## 2025-02-06 PROCEDURE — 85025 COMPLETE CBC W/AUTO DIFF WBC: CPT | Mod: HCNC | Performed by: NURSE PRACTITIONER

## 2025-02-06 PROCEDURE — 84484 ASSAY OF TROPONIN QUANT: CPT | Mod: 91,HCNC | Performed by: EMERGENCY MEDICINE

## 2025-02-06 PROCEDURE — 81003 URINALYSIS AUTO W/O SCOPE: CPT | Mod: HCNC | Performed by: NURSE PRACTITIONER

## 2025-02-06 PROCEDURE — 87635 SARS-COV-2 COVID-19 AMP PRB: CPT | Mod: HCNC | Performed by: NURSE PRACTITIONER

## 2025-02-06 PROCEDURE — 1101F PT FALLS ASSESS-DOCD LE1/YR: CPT | Mod: HCNC,CPTII,S$GLB, | Performed by: UROLOGY

## 2025-02-06 PROCEDURE — 84484 ASSAY OF TROPONIN QUANT: CPT | Mod: HCNC | Performed by: NURSE PRACTITIONER

## 2025-02-06 PROCEDURE — 93005 ELECTROCARDIOGRAM TRACING: CPT | Mod: HCNC

## 2025-02-06 PROCEDURE — 99999 PR PBB SHADOW E&M-EST. PATIENT-LVL IV: CPT | Mod: PBBFAC,HCNC,, | Performed by: UROLOGY

## 2025-02-06 PROCEDURE — 1160F RVW MEDS BY RX/DR IN RCRD: CPT | Mod: HCNC,CPTII,S$GLB, | Performed by: UROLOGY

## 2025-02-06 PROCEDURE — 20000000 HC ICU ROOM: Mod: HCNC

## 2025-02-06 PROCEDURE — 83880 ASSAY OF NATRIURETIC PEPTIDE: CPT | Mod: HCNC | Performed by: NURSE PRACTITIONER

## 2025-02-06 PROCEDURE — 83690 ASSAY OF LIPASE: CPT | Mod: HCNC | Performed by: EMERGENCY MEDICINE

## 2025-02-06 PROCEDURE — 99214 OFFICE O/P EST MOD 30 MIN: CPT | Mod: HCNC,S$GLB,, | Performed by: UROLOGY

## 2025-02-06 PROCEDURE — 25000003 PHARM REV CODE 250: Mod: HCNC | Performed by: EMERGENCY MEDICINE

## 2025-02-06 PROCEDURE — 93010 ELECTROCARDIOGRAM REPORT: CPT | Mod: HCNC,,, | Performed by: INTERNAL MEDICINE

## 2025-02-06 PROCEDURE — 96374 THER/PROPH/DIAG INJ IV PUSH: CPT | Mod: HCNC

## 2025-02-06 RX ORDER — SODIUM,POTASSIUM PHOSPHATES 280-250MG
2 POWDER IN PACKET (EA) ORAL
Status: DISCONTINUED | OUTPATIENT
Start: 2025-02-06 | End: 2025-02-08 | Stop reason: HOSPADM

## 2025-02-06 RX ORDER — VIBEGRON 75 MG/1
75 TABLET, FILM COATED ORAL DAILY
Qty: 30 TABLET | Refills: 11 | Status: ON HOLD | OUTPATIENT
Start: 2025-02-06 | End: 2025-02-08

## 2025-02-06 RX ORDER — ATROPINE SULFATE 0.1 MG/ML
1 INJECTION INTRAVENOUS
Status: COMPLETED | OUTPATIENT
Start: 2025-02-06 | End: 2025-02-06

## 2025-02-06 RX ORDER — ONDANSETRON HYDROCHLORIDE 2 MG/ML
4 INJECTION, SOLUTION INTRAVENOUS EVERY 8 HOURS PRN
Status: DISCONTINUED | OUTPATIENT
Start: 2025-02-06 | End: 2025-02-08 | Stop reason: HOSPADM

## 2025-02-06 RX ORDER — SODIUM CHLORIDE 0.9 % (FLUSH) 0.9 %
10 SYRINGE (ML) INJECTION
Status: DISCONTINUED | OUTPATIENT
Start: 2025-02-06 | End: 2025-02-08 | Stop reason: HOSPADM

## 2025-02-06 RX ORDER — FUROSEMIDE 10 MG/ML
40 INJECTION INTRAMUSCULAR; INTRAVENOUS
Status: COMPLETED | OUTPATIENT
Start: 2025-02-06 | End: 2025-02-06

## 2025-02-06 RX ORDER — LANOLIN ALCOHOL/MO/W.PET/CERES
800 CREAM (GRAM) TOPICAL
Status: DISCONTINUED | OUTPATIENT
Start: 2025-02-06 | End: 2025-02-08 | Stop reason: HOSPADM

## 2025-02-06 RX ORDER — HYDROCODONE BITARTRATE AND ACETAMINOPHEN 5; 325 MG/1; MG/1
1 TABLET ORAL
Status: COMPLETED | OUTPATIENT
Start: 2025-02-06 | End: 2025-02-06

## 2025-02-06 RX ORDER — ATROPINE SULFATE 1 MG/ML
1 INJECTION, SOLUTION INTRAMUSCULAR; INTRAVENOUS; SUBCUTANEOUS
Status: DISCONTINUED | OUTPATIENT
Start: 2025-02-06 | End: 2025-02-06

## 2025-02-06 RX ADMIN — ATROPINE SULFATE 1 MG: 0.1 INJECTION INTRAVENOUS at 05:02

## 2025-02-06 RX ADMIN — IOHEXOL 100 ML: 350 INJECTION, SOLUTION INTRAVENOUS at 04:02

## 2025-02-06 RX ADMIN — HYDROCODONE BITARTRATE AND ACETAMINOPHEN 1 TABLET: 5; 325 TABLET ORAL at 04:02

## 2025-02-06 RX ADMIN — FUROSEMIDE 40 MG: 10 INJECTION, SOLUTION INTRAMUSCULAR; INTRAVENOUS at 05:02

## 2025-02-06 NOTE — PROGRESS NOTES
Chief Complaint:  Urgency and urge incontinence, hematuria    HPI:   Long Hines is a 89 y.o. male that presents today as a referral from STEPAN Mukherjee for urge incontinence and hematuria.  Patient's daughter notes the urgency and urge incontinence has been going on for several years.  States that whenever he touches water whenever he washes his hands, washes dishes, he has the profound urge to urinate and asked to go to the bathroom but often times does not make it.  He does not currently wear any pads or diapers, just has to change his clothes when this happens.  Has a good stream and feels like he empties.  Does note some intermittent hematuria.  Has never seen a urologist before.  No UTIs or prior urologic procedures.  Denies family history of  cancers.    PMH:  Past Medical History:   Diagnosis Date    A-fib     Diastolic dysfunction 12/2017    Hypertriglyceridemia        PSH:  Past Surgical History:   Procedure Laterality Date    ABDOMINAL SURGERY      appendix removal in [place] 3 years ago    CATARACT EXTRACTION, BILATERAL         Family History:  Family History   Problem Relation Name Age of Onset    Cancer Neg Hx      Diabetes Neg Hx      Heart disease Neg Hx      Hypertension Neg Hx      Kidney disease Neg Hx      Stroke Neg Hx         Social History:  Social History     Tobacco Use    Smoking status: Former     Current packs/day: 0.00     Average packs/day: 1 pack/day for 61.0 years (61.0 ttl pk-yrs)     Types: Cigarettes     Start date: 1949     Quit date: 2010     Years since quitting: 15.1     Passive exposure: Past    Smokeless tobacco: Never   Substance Use Topics    Alcohol use: No     Alcohol/week: 0.0 standard drinks of alcohol    Drug use: No        Review of Systems:  General: No fever, chills  Skin: No rashes  Chest:  Denies cough and sputum production  Heart: Denies chest pain  Resp: Denies dyspnea  Abdomen: Denies diarrhea, abdominal pain, hematemesis, or blood in stool.  Musculoskeletal: No  joint stiffness or swelling. Denies back pain.  : see HPI  Neuro: no dizziness or weakness    Allergies:  Patient has no known allergies.    Medications:    Current Outpatient Medications:     albuterol (ACCUNEB) 0.63 mg/3 mL Nebu, Take 3 mLs (0.63 mg total) by nebulization every 6 (six) hours as needed (wheezing or shortness of breath). (Patient not taking: Reported on 2/6/2025), Disp: 75 mL, Rfl: 5    albuterol-ipratropium (DUO-NEB) 2.5 mg-0.5 mg/3 mL nebulizer solution, Take by nebulization as needed for Wheezing. (Patient not taking: Reported on 2/6/2025), Disp: , Rfl:     BREO ELLIPTA 200-25 mcg/dose DsDv diskus inhaler, Inhale 1 puff into the lungs as needed. (Patient not taking: Reported on 2/6/2025), Disp: , Rfl:     cyproheptadine (PERIACTIN) 4 mg tablet, Take 1 tablet (4 mg total) by mouth 2 (two) times daily as needed (decreased appetite). (Patient not taking: Reported on 2/6/2025), Disp: 60 tablet, Rfl: 2    FLUAD QUAD 2023-24,65Y UP,,PF, 60 mcg (15 mcg x 4)/0.5 mL Syrg, , Disp: , Rfl:     furosemide (LASIX) 20 MG tablet, Take 2 tablets (40 mg total) by mouth once daily. (Patient not taking: Reported on 2/6/2025), Disp: 60 tablet, Rfl: 5    meloxicam (MOBIC) 15 MG tablet, Take 1 tablet (15 mg total) by mouth daily as needed for Pain. (Patient not taking: Reported on 2/6/2025), Disp: 30 tablet, Rfl: 5    vibegron (GEMTESA) 75 mg Tab, Take 1 tablet (75 mg total) by mouth once daily., Disp: 30 tablet, Rfl: 11    Current Facility-Administered Medications:     aspirin chewable tablet 81 mg, 81 mg, Oral, Daily, Herbert Guzman MD    Physical Exam:  Vitals:    02/06/25 1018   BP: (!) 101/45   Pulse: (!) 45     Body mass index is 21.64 kg/m².  General: awake, alert, cooperative  Head: NC/AT  Ears: external ears normal  Eyes: sclera normal  Lungs: normal inspiration, NAD  Heart: well-perfused  Skin: The skin is warm and dry  Ext: No c/c/e.  Neuro: grossly intact, AOx3    RADIOLOGY:  CT ANGIOGRAM ABDOMEN,  11/23/2024 1:31 PM     COMPARISON: CT abdomen 5/29/2024     HISTORY: Mesenteric ischemia     TECHNIQUE: Helically acquired axial CT angiographic images were obtained of the abdomen and pelvis with and without IV contrast. Coronal and sagittal MIP and 3-D volume rendered reformatted images were made.     Automated exposure control was used for dose reduction.     FINDINGS:   VASCULAR FINDINGS:   Scattered vascular calcifications. Abdominal aorta is normal in caliber. There is no intramural hematoma, dissection, or aneurysm. Celiac artery and branches are widely patent. Superior mesenteric artery is patent. Solitary renal arteries bilaterally which are widely patent. Inferior mesenteric artery is patent. Common iliac, internal, and external iliac arteries are widely patent.     NONVASCULAR FINDINGS:   The liver, spleen, adrenal glands are normal. Fatty atrophy of the pancreas without focal mass or ductal dilatation. Hypoattenuating right upper pole renal lesion suggestive of a cyst. No enhancing renal mass, renal calculus or hydronephrosis.. Gallbladder is unremarkable.     Ball is within normal limits. There are no inflammatory changes about the bowel. There is no free fluid or free air. Prostate is unremarkable. Urinary bladder is unremarkable. Small bilateral fat-containing of focal hernias.     Diffuse demineralization. No acute or suspicious bone abnormality.     LABS:  I personally reviewed the following lab values:  Lab Results   Component Value Date    WBC 5.04 02/06/2025    HGB 15.2 02/06/2025    HCT 44.0 02/06/2025     02/06/2025     02/06/2025    K 4.3 02/06/2025     02/06/2025    CREATININE 1.0 02/06/2025    BUN 19 02/06/2025    CO2 23 02/06/2025    TSH 0.894 01/21/2024    INR 1.1 01/22/2024    CHOL 193 12/12/2024    TRIG 187 (H) 12/12/2024    HDL 43 12/12/2024    ALT 12 02/06/2025    AST 24 02/06/2025       URINALYSIS:  Urinalysis obtained in clinic today specific gravity > 1.030 pH  5.5, trace intact blood, trace protein, negative for all other parameters    PVR = 2 mL    Assessment/Plan:   Long Hines is a 89 y.o. male with:    Urge incontinence - start Gemtesa, side effects reviewed    Constipation - recommend Metamucil daily    Hematuria - follow-up for office cysto    Thank you for allowing me the opportunity to participate in this patient's care.     Juancarlos Brady MD  Urology

## 2025-02-06 NOTE — FIRST PROVIDER EVALUATION
"Medical screening examination initiated.  I have conducted a focused provider triage encounter, findings are as follows:    Brief history of present illness:  Patient complains of chest pain lower back pain with coughing    Vitals:    02/06/25 1133   BP: (!) 158/77   Pulse: (!) 47   Resp: 20   Temp: 98 °F (36.7 °C)   TempSrc: Oral   SpO2: 97%   Weight: 56.7 kg (125 lb)   Height: 4' 9" (1.448 m)       Pertinent physical exam:  Frail appearing    Brief workup plan:  Cardiac workup    Preliminary workup initiated; this workup will be continued and followed by the physician or advanced practice provider that is assigned to the patient when roomed.  "

## 2025-02-06 NOTE — ED PROVIDER NOTES
SCRIBE #1 NOTE: I, Gee Baez, am scribing for, and in the presence of, Ruth Ivy DO. I have scribed the entire note.       History     Chief Complaint   Patient presents with    Chest Pain     Mid sternal chest pain x one day with movement. Also c/o back pain. Denies SOB.      Review of patient's allergies indicates:  No Known Allergies      History of Present Illness     HPI    Interpretation conducted by daughter at bedside: [place]. Patient and daughter refused translating services.     2/6/2025, 3:48 PM  History obtained from the patient and pt's daughter at bedside      History of Present Illness: Long Albarran is a 89 y.o. male patient with a PMHx of HLD and AFIB who presents to the Emergency Department for evaluation of CP which onset gradually within the past day. Pain radiates to the lower back. Denies any recent injuries or traumas. Pt denies any blood thinners. Pt had some bilateral hand numbness that has currently resolved. Denies any perineum numbness. Symptoms are intermittent and moderate in severity. No mitigating or exacerbating factors reported. Associated sxs include SOB and palpitations. Patient denies all other sxs at this time. Pt is unrelieved by home medications. No further complaints or concerns at this time.       Arrival mode: Personal vehicle     PCP: Penny Watson DO        Past Medical History:  Past Medical History:   Diagnosis Date    A-fib     Diastolic dysfunction 12/2017    Hypertriglyceridemia        Past Surgical History:  Past Surgical History:   Procedure Laterality Date    ABDOMINAL SURGERY      appendix removal in [place] 3 years ago    CATARACT EXTRACTION, BILATERAL           Family History:  Family History   Problem Relation Name Age of Onset    Cancer Neg Hx      Diabetes Neg Hx      Heart disease Neg Hx      Hypertension Neg Hx      Kidney disease Neg Hx      Stroke Neg Hx         Social History:  Social History     Tobacco Use    Smoking status: Former      Current packs/day: 0.00     Average packs/day: 1 pack/day for 61.0 years (61.0 ttl pk-yrs)     Types: Cigarettes     Start date: 1949     Quit date: 2010     Years since quitting: 15.1     Passive exposure: Past    Smokeless tobacco: Never   Substance and Sexual Activity    Alcohol use: No     Alcohol/week: 0.0 standard drinks of alcohol    Drug use: No    Sexual activity: Not Currently        Review of Systems     Review of Systems   Constitutional:  Negative for diaphoresis and fever.   Respiratory:  Negative for shortness of breath.    Cardiovascular:  Positive for chest pain.   Gastrointestinal:  Negative for nausea and vomiting.        (-) bowel incontinence/retention    Genitourinary:         (-) urinary incontinence/retention   Musculoskeletal:  Positive for back pain (lower).   Neurological:  Negative for dizziness, light-headedness and numbness (bilateral hands, resolved).        (-) perineum numbness        Physical Exam     Initial Vitals [02/06/25 1133]   BP Pulse Resp Temp SpO2   (!) 158/77 (!) 47 20 98 °F (36.7 °C) 97 %      MAP       --          Physical Exam  Nursing Notes and Vital Signs Reviewed.  Constitutional: Patient is in no acute distress. Elderly and frail.  Head: Atraumatic. Normocephalic.  Eyes: PERRL. EOM intact.   ENT: Mucous membranes are moist.   Neck: Supple. Full ROM.   Cardiovascular: Bradycardic. Irregularly irregular rhythm. Pulmonary/Chest: No respiratory distress. Clear to auscultation bilaterally. No wheezing or rales.  Abdominal: Soft and non-distended.  There is epigastric tenderness.  No rebound, guarding, or rigidity. Musculoskeletal: Moves all extremities. No obvious deformities. No edema. No calf tenderness.  Skin: Warm and dry.  Neurological:  Alert, awake, and appropriate.  Normal speech.  No acute focal neurological deficits are appreciated.  Psychiatric: Normal affect. Good eye contact. Appropriate in content.     ED Course   Critical Care    Date/Time: 2/6/2025  "6:23 PM    Performed by: Ruth Ivy DO  Authorized by: Ruth Ivy DO  Direct patient critical care time: 27 minutes  Additional history critical care time: 8 minutes  Ordering / reviewing critical care time: 7 minutes  Documentation critical care time: 8 minutes  Consulting other physicians critical care time: 7 minutes  Consult with family critical care time: 4 minutes  Total critical care time (exclusive of procedural time) : 61 minutes  Critical care time was exclusive of separately billable procedures and treating other patients and teaching time.  Critical care was necessary to treat or prevent imminent or life-threatening deterioration of the following conditions: cardiac failure.  Critical care was time spent personally by me on the following activities: development of treatment plan with patient or surrogate, discussions with consultants, evaluation of patient's response to treatment, examination of patient, obtaining history from patient or surrogate, ordering and performing treatments and interventions, ordering and review of radiographic studies, ordering and review of laboratory studies, pulse oximetry, re-evaluation of patient's condition, review of old charts and interpretation of cardiac output measurements.        ED Vital Signs:  Vitals:    02/06/25 1133 02/06/25 1616 02/06/25 1648 02/06/25 1700   BP: (!) 158/77   (!) 209/82   Pulse: (!) 47 (!) 43  (!) 41   Resp: 20  17 20   Temp: 98 °F (36.7 °C)      TempSrc: Oral      SpO2: 97%   96%   Weight: 56.7 kg (125 lb)      Height: 4' 9" (1.448 m)       02/06/25 1730 02/06/25 1747 02/06/25 1800 02/06/25 1830   BP: (!) 175/74  (!) 182/87 (!) 135/96   Pulse: (!) 44 76 100 95   Resp: (!) 21  15 20   Temp:       TempSrc:       SpO2: 97%  (!) 93% (!) 92%   Weight:       Height:        02/06/25 1845   BP: (!) 160/71   Pulse: 97   Resp: 17   Temp:    TempSrc:    SpO2: 97%   Weight:    Height:        Abnormal Lab Results:  Labs Reviewed   B-TYPE " NATRIURETIC PEPTIDE - Abnormal       Result Value     (*)    TROPONIN I - Abnormal    Troponin I 0.059 (*)    TROPONIN I - Abnormal    Troponin I 0.061 (*)    CBC W/ AUTO DIFFERENTIAL    WBC 5.04      RBC 5.00      Hemoglobin 15.2      Hematocrit 44.0      MCV 88      MCH 30.4      MCHC 34.5      RDW 12.9      Platelets 164      MPV 10.3      Immature Granulocytes 0.4      Gran # (ANC) 2.5      Immature Grans (Abs) 0.02      Lymph # 1.8      Mono # 0.5      Eos # 0.2      Baso # 0.04      nRBC 0      Gran % 50.2      Lymph % 35.3      Mono % 10.1      Eosinophil % 3.2      Basophil % 0.8      Differential Method Automated     COMPREHENSIVE METABOLIC PANEL    Sodium 138      Potassium 4.3      Chloride 107      CO2 23      Glucose 104      BUN 19      Creatinine 1.0      Calcium 9.1      Total Protein 7.5      Albumin 3.8      Total Bilirubin 0.5      Alkaline Phosphatase 103      AST 24      ALT 12      eGFR >60      Anion Gap 8     SARS-COV-2 RNA AMPLIFICATION, QUAL    SARS-CoV-2 RNA, Amplification, Qual Negative     LIPASE   LIPASE    Lipase 17     URINALYSIS, REFLEX TO URINE CULTURE   POCT GLUCOSE MONITORING CONTINUOUS        All Lab Results:  Results for orders placed or performed during the hospital encounter of 02/06/25   EKG 12-lead    Collection Time: 02/06/25 11:34 AM   Result Value Ref Range    QRS Duration 134 ms    [facility] QTC Calculation 435 ms   Brain natriuretic peptide    Collection Time: 02/06/25  1:38 PM   Result Value Ref Range     (H) 0 - 99 pg/mL   Comprehensive metabolic panel    Collection Time: 02/06/25  1:38 PM   Result Value Ref Range    Sodium 138 136 - 145 mmol/L    Potassium 4.3 3.5 - 5.1 mmol/L    Chloride 107 95 - 110 mmol/L    CO2 23 23 - 29 mmol/L    Glucose 104 70 - 110 mg/dL    BUN 19 10 - 30 mg/dL    Creatinine 1.0 0.5 - 1.4 mg/dL    Calcium 9.1 8.7 - 10.5 mg/dL    Total Protein 7.5 6.0 - 8.4 g/dL    Albumin 3.8 3.5 - 5.2 g/dL    Total Bilirubin 0.5 0.1 - 1.0 mg/dL     Alkaline Phosphatase 103 40 - 150 U/L    AST 24 10 - 40 U/L    ALT 12 10 - 44 U/L    eGFR >60 >60 mL/min/1.73 m^2    Anion Gap 8 8 - 16 mmol/L   Troponin I    Collection Time: 02/06/25  1:38 PM   Result Value Ref Range    Troponin I 0.059 (H) 0.000 - 0.026 ng/mL   COVID-19 Rapid Screening    Collection Time: 02/06/25  1:39 PM   Result Value Ref Range    SARS-CoV-2 RNA, Amplification, Qual Negative Negative   CBC auto differential    Collection Time: 02/06/25  1:43 PM   Result Value Ref Range    WBC 5.04 3.90 - 12.70 K/uL    RBC 5.00 4.60 - 6.20 M/uL    Hemoglobin 15.2 14.0 - 18.0 g/dL    Hematocrit 44.0 40.0 - 54.0 %    MCV 88 82 - 98 fL    MCH 30.4 27.0 - 31.0 pg    MCHC 34.5 32.0 - 36.0 g/dL    RDW 12.9 11.5 - 14.5 %    Platelets 164 150 - 450 K/uL    MPV 10.3 9.2 - 12.9 fL    Immature Granulocytes 0.4 0.0 - 0.5 %    Gran # (ANC) 2.5 1.8 - 7.7 K/uL    Immature Grans (Abs) 0.02 0.00 - 0.04 K/uL    Lymph # 1.8 1.0 - 4.8 K/uL    Mono # 0.5 0.3 - 1.0 K/uL    Eos # 0.2 0.0 - 0.5 K/uL    Baso # 0.04 0.00 - 0.20 K/uL    nRBC 0 0 /100 WBC    Gran % 50.2 38.0 - 73.0 %    Lymph % 35.3 18.0 - 48.0 %    Mono % 10.1 4.0 - 15.0 %    Eosinophil % 3.2 0.0 - 8.0 %    Basophil % 0.8 0.0 - 1.9 %    Differential Method Automated    Troponin I    Collection Time: 02/06/25  4:22 PM   Result Value Ref Range    Troponin I 0.061 (H) 0.000 - 0.026 ng/mL   Lipase    Collection Time: 02/06/25  4:22 PM   Result Value Ref Range    Lipase 17 4 - 60 U/L         Imaging Results:  Imaging Results              CTA Chest Non-Coronary (PE Studies) (Final result)  Result time 02/06/25 16:55:57      Final result by Parker Sung III, MD (02/06/25 16:55:57)                   Impression:         Negative for pulmonary pulmonary embolism.    Mild scarring or atelectasis bilaterally.    Multiple compression deformities lower thoracic spine, age indeterminate.          Finalized on: 2/6/2025 4:55 PM By:  Parker Sung MD  [facility]# [id number]       2025-02-06 16:57:59.020     [facility]               Narrative:    EXAM: CTA CHEST NON CORONARY (PE STUDIES)    CLINICAL HISTORY:  Pulmonary embolism (PE) suspected, unknown D-dimer;    CTA TECHNIQUE: Standard thin-section CTA axial imaging performed following administration of IV contrast.  Reconstructed 3-D MIP images also obtained, reviewed, and archived.    All CT scans at this location are performed using dose modulation techniques as appropriate to a performed exam including the following: automated exposure control; adjustment of the mA and/or kV according to patient size (this includes techniques or standardized protocols for targeted exams where dose is matched to indication / reason for exam; i.e. extremities or head); use of iterative reconstruction technique.    COMPARISON:  None.    FINDINGS:     Pulmonary arteries are patent.  Negative for pulmonary was.  Main pulmonary trunk normal in size.  Mild cardiomegaly. No coronary artery calcifications. Aorta not well opacified but is normal caliber with mild atherosclerotic calcification.  Negative for adenopathy throughout the chest.    Scarring right lung apex.  Otherwise the right lung is clear.  Atelectasis or scarring within the lingula and left lower lobe.  Otherwise left lung clear.    Soft tissues of the inferior neck are normal.  Trachea and esophagus are midline and normal in appearance.    Visualized upper abdominal contents are normal.    Chest wall soft tissues are normal. Multiple compression deformities of the T8, T9, T10 and T11 vertebral bodies, age indeterminate.  No retropulsion.                                         X-Ray Lumbar Spine Ap And Lateral (Final result)  Result time 02/06/25 13:07:17      Final result by Ernst Caballero MD (02/06/25 13:07:17)                   Impression:      As above.      Electronically signed by: Ernst Caballero  Date:    02/06/2025  Time:    13:07               Narrative:    EXAMINATION:  XR LUMBAR SPINE AP  AND LATERAL    CLINICAL HISTORY:  Low back pain;    TECHNIQUE:  AP, lateral and spot images were performed of the lumbar spine.    COMPARISON:  None    FINDINGS:  No fracture.  No traumatic malalignment.  No osseous destructive process.  Remote height loss suspected at L1 and L2 of the vertebral bodies.  Otherwise vertebral body heights are well maintained.  Mild-to-moderate degenerative change.                                       X-Ray Chest 1 View (Final result)  Result time 02/06/25 13:02:01      Final result by Scott Soler MD (02/06/25 13:02:01)                   Impression:      No acute process seen.      Electronically signed by: Scott Soler MD  Date:    02/06/2025  Time:    13:02               Narrative:    EXAMINATION:  XR CHEST 1 VIEW    CLINICAL HISTORY:  Chest pain, unspecified    FINDINGS:  Single view of the chest.  Comparison 04/22/2024    Cardiac silhouette is enlarged but stable.  The lungs demonstrate no evidence of active disease.  No evidence of pleural effusion or pneumothorax.  Bones appear intact.  Moderate degenerative changes and moderate atherosclerotic disease.                                       The EKG was ordered, reviewed, and independently interpreted by the ED provider.  Interpretation time: 11:34  Rate: 47 BPM  Rhythm: atrial fibrillation with slow RVR with premature ventricular or aberrantly conducted complexes  Interpretation: Right axis deviation. Nonspecific intraventricular block. Possible right ventricular hypertrophy. Cannot rule out septal infarct, age undetermined. No STEMI.              The Emergency Provider reviewed the vital signs and test results, which are outlined above.     ED Discussion       6:30 PM: Dr. Molina (Cardiology) reviewed pt's case and recommends admission.  Also recommends atropine and IV Lasix.    6:15 PM: Dr. Turner reviewed the pt's case. Pt is stable for admission. Dr. Turner attempted to speak to pt's daughter at bedside regarding  goals of care discussion, but daughter denied at this time. The pt's son will be at bedside sometime later in the evening.    6:21 PM: Discussed case with Dr. Turner (ICU ). Dr. Turner  agrees with current care and management of pt and accepts admission.  Admitting Service: ICU   Admitting Physician: Dr. Turner   Admit to: ICU     6:21 PM: Re-evaluated pt. I have discussed test results, shared treatment plan, and the need for admission with patient and family at bedside. Pt and family express understanding at this time and agree with all information. All questions answered. Pt and family have no further questions or concerns at this time. Pt is ready for admit.     ED Course as of 02/06/25 1855   Thu Feb 06, 2025   1551 TTE Feb 2024  ·  Left Ventricle: The left ventricle is normal in size. Mildly increased wall thickness. There is mild concentric hypertrophy. There is normal systolic function with a visually estimated ejection fraction of 55 - 60%. There is diastolic dysfunction but grade cannot be determined.  ·  Right Ventricle: Normal right ventricular cavity size. Wall thickness is normal. Right ventricle wall motion  is normal. Systolic function is normal.  ·  Left Atrium: Left atrium is moderately dilated.  ·  Mitral Valve: There is mild regurgitation.  ·  Tricuspid Valve: There is mild regurgitation.  ·  Pulmonary Artery: The estimated pulmonary artery systolic pressure is 42 mmHg.  ·  IVC/SVC: Normal venous pressure at 3 mmHg.   [NF]   1713 89-year-old male who presents with chest pain that radiates to his back.  EKG shows chronic AFib with slow ventricular response.  Troponin chronically elevated but flat.  .  CTA negative for PE.  I see a TTE from February of last year but do not find any evidence of previous stress tests are [facility].  Heart rate in the 30s.  Responded well to atropine.  Diuresing with IV Lasix.    Ddx:  CHF, MI, pancreatitis, kidney stone, pyelonephritis, compression fracture  [NF]      ED Course User Index  [NF] Ruth Ivy DO     Medical Decision Making  Amount and/or Complexity of Data Reviewed  Independent Historian: caregiver     Details: Pt's daughter at bedside    Labs: ordered. Decision-making details documented in ED Course.  Radiology: ordered. Decision-making details documented in ED Course.  ECG/medicine tests: ordered and independent interpretation performed. Decision-making details documented in ED Course.    Risk  Prescription drug management.  Decision regarding hospitalization.                ED Medication(s):  Medications   sodium chloride 0.9% flush 10 mL (has no administration in time range)   potassium bicarbonate disintegrating tablet 50 mEq (has no administration in time range)   potassium bicarbonate disintegrating tablet 35 mEq (has no administration in time range)   potassium bicarbonate disintegrating tablet 60 mEq (has no administration in time range)   magnesium oxide tablet 800 mg (has no administration in time range)   magnesium oxide tablet 800 mg (has no administration in time range)   potassium, sodium phosphates 280-160-250 mg packet 2 packet (has no administration in time range)   potassium, sodium phosphates 280-160-250 mg packet 2 packet (has no administration in time range)   potassium, sodium phosphates 280-160-250 mg packet 2 packet (has no administration in time range)   ondansetron injection 4 mg (has no administration in time range)   HYDROcodone-acetaminophen 5-325 mg per tablet 1 tablet (1 tablet Oral Given 2/6/25 1648)   iohexoL (OMNIPAQUE 350) injection 100 mL (100 mLs Intravenous Given 2/6/25 1630)   atropine injection 1 mg (1 mg Intravenous Given 2/6/25 1739)   furosemide injection 40 mg (40 mg Intravenous Given 2/6/25 1743)       New Prescriptions    No medications on file               Scribe Attestation:   Scribe #1: I performed the above scribed service and the documentation accurately describes the services I performed. I attest  to the accuracy of the note.     Attending:   Physician Attestation Statement for Scribe #1: I, Ruth Ivy DO, personally performed the services described in this documentation, as scribed by Gee Baez, in my presence, and it is both accurate and complete.           Clinical Impression       ICD-10-CM ICD-9-CM   1. Bradycardia  R00.1 427.89   2. Chest pain  R07.9 786.50   3. Closed compression fracture of thoracic vertebra, initial encounter  S22.000A 805.2   4. Congestive heart failure, unspecified HF chronicity, unspecified heart failure type  I50.9 428.0   5. NSTEMI (non-ST elevated myocardial infarction)  I21.4 410.70   6. Hypertension, unspecified type  I10 401.9       Disposition:   Disposition: Admitted  Condition: Stable        Ruth Ivy DO  02/06/25 4529

## 2025-02-06 NOTE — Clinical Note
Diagnosis: Chest pain [545911]   Future Attending Provider: DEDE NG [[id number]]   Reason for IP Medical Treatment  (Clinical interventions that can only be accomplished in the IP setting? ) :: Chest pain, compression fractures   Plans for Post-Acute care--if anticipated (pick the single best option):: A. No post acute care anticipated at this time   Special Needs:: No Special Needs [1]

## 2025-02-06 NOTE — HPI
89 y.o. male, comorbid conditions include Bradycardia, CHF, HLD and AFIB. Presented to the ED for evaluation of CP which onset gradually within the past day. Pain radiates to the lower back. Denies any recent injuries or traumas. Pt denies any blood thinners. Symptoms are intermittent and moderate in severity. No mitigating or exacerbating factors reported. Associated sxs include SOB and palpitations.  Admitted to the ICU and monitored overnight. Cardiology recommended continuation of heparin gtt and tele monitoring. Patient stable for transfer to the floor.

## 2025-02-07 LAB
ALBUMIN SERPL BCP-MCNC: 4 G/DL (ref 3.5–5.2)
ALP SERPL-CCNC: 87 U/L (ref 40–150)
ALT SERPL W/O P-5'-P-CCNC: 13 U/L (ref 10–44)
ANION GAP SERPL CALC-SCNC: 11 MMOL/L (ref 8–16)
AORTIC ROOT ANNULUS: 2.73 CM
APTT PPP: 27.8 SEC (ref 21–32)
APTT PPP: 67.1 SEC (ref 21–32)
APTT PPP: 79.6 SEC (ref 21–32)
APTT PPP: 82.8 SEC (ref 21–32)
ASCENDING AORTA: 3.22 CM
AST SERPL-CCNC: 24 U/L (ref 10–40)
AV INDEX (PROSTH): 0.76
AV MEAN GRADIENT: 3 MMHG
AV PEAK GRADIENT: 2 MMHG
AV VALVE AREA BY VELOCITY RATIO: 4 CM²
AV VALVE AREA: 2.4 CM²
AV VELOCITY RATIO: 1.29
BASOPHILS # BLD AUTO: 0.05 K/UL (ref 0–0.2)
BASOPHILS # BLD AUTO: 0.07 K/UL (ref 0–0.2)
BASOPHILS NFR BLD: 0.8 % (ref 0–1.9)
BASOPHILS NFR BLD: 1.1 % (ref 0–1.9)
BILIRUB SERPL-MCNC: 0.9 MG/DL (ref 0.1–1)
BSA FOR ECHO PROCEDURE: 1.5 M2
BUN SERPL-MCNC: 20 MG/DL (ref 10–30)
CALCIUM SERPL-MCNC: 9.6 MG/DL (ref 8.7–10.5)
CHLORIDE SERPL-SCNC: 105 MMOL/L (ref 95–110)
CO2 SERPL-SCNC: 23 MMOL/L (ref 23–29)
CREAT SERPL-MCNC: 0.9 MG/DL (ref 0.5–1.4)
CV ECHO LV RWT: 0.3 CM
DIFFERENTIAL METHOD BLD: NORMAL
DIFFERENTIAL METHOD BLD: NORMAL
DOP CALC AO PEAK VEL: 0.7 M/S
DOP CALC AO VTI: 24.7 CM
DOP CALC LVOT AREA: 3.1 CM2
DOP CALC LVOT DIAMETER: 2 CM
DOP CALC LVOT PEAK VEL: 0.9 M/S
DOP CALC LVOT STROKE VOLUME: 58.7 CM3
DOP CALC RVOT PEAK VEL: 1.23 M/S
DOP CALC RVOT VTI: 21 CM
DOP CALCLVOT PEAK VEL VTI: 18.7 CM
ECHO LV POSTERIOR WALL: 0.7 CM (ref 0.6–1.1)
EOSINOPHIL # BLD AUTO: 0.2 K/UL (ref 0–0.5)
EOSINOPHIL # BLD AUTO: 0.2 K/UL (ref 0–0.5)
EOSINOPHIL NFR BLD: 2.4 % (ref 0–8)
EOSINOPHIL NFR BLD: 2.7 % (ref 0–8)
ERYTHROCYTE [DISTWIDTH] IN BLOOD BY AUTOMATED COUNT: 12.6 % (ref 11.5–14.5)
ERYTHROCYTE [DISTWIDTH] IN BLOOD BY AUTOMATED COUNT: 12.7 % (ref 11.5–14.5)
EST. GFR  (NO RACE VARIABLE): >60 ML/MIN/1.73 M^2
FRACTIONAL SHORTENING: 28.3 % (ref 28–44)
GLUCOSE SERPL-MCNC: 109 MG/DL (ref 70–110)
HCT VFR BLD AUTO: 46.6 % (ref 40–54)
HCT VFR BLD AUTO: 47.8 % (ref 40–54)
HGB BLD-MCNC: 16.3 G/DL (ref 14–18)
HGB BLD-MCNC: 16.4 G/DL (ref 14–18)
IMM GRANULOCYTES # BLD AUTO: 0.01 K/UL (ref 0–0.04)
IMM GRANULOCYTES # BLD AUTO: 0.02 K/UL (ref 0–0.04)
IMM GRANULOCYTES NFR BLD AUTO: 0.2 % (ref 0–0.5)
IMM GRANULOCYTES NFR BLD AUTO: 0.3 % (ref 0–0.5)
INR PPP: 1 (ref 0.8–1.2)
INTERVENTRICULAR SEPTUM: 0.9 CM (ref 0.6–1.1)
IVC DIAMETER: 1.65 CM
IVRT: 122 MSEC
LA MAJOR: 6.2 CM
LA MINOR: 6 CM
LA WIDTH: 4.1 CM
LEFT ATRIUM AREA SYSTOLIC (APICAL 2 CHAMBER): 23.82 CM2
LEFT ATRIUM SIZE: 3.8 CM
LEFT ATRIUM VOLUME INDEX: 55 ML/M2
LEFT ATRIUM VOLUME: 81 CM3
LEFT INTERNAL DIMENSION IN SYSTOLE: 3.3 CM (ref 2.1–4)
LEFT VENTRICLE DIASTOLIC VOLUME INDEX: 67.89 ML/M2
LEFT VENTRICLE DIASTOLIC VOLUME: 99.12 ML
LEFT VENTRICLE END SYSTOLIC VOLUME APICAL 2 CHAMBER: 71.85 ML
LEFT VENTRICLE MASS INDEX: 80.8 G/M2
LEFT VENTRICLE SYSTOLIC VOLUME INDEX: 29.4 ML/M2
LEFT VENTRICLE SYSTOLIC VOLUME: 42.95 ML
LEFT VENTRICULAR INTERNAL DIMENSION IN DIASTOLE: 4.6 CM (ref 3.5–6)
LEFT VENTRICULAR MASS: 117.9 G
LVED V (TEICH): 99.12 ML
LVES V (TEICH): 42.95 ML
LVOT MG: 1.41 MMHG
LVOT MV: 0.53 CM/S
LYMPHOCYTES # BLD AUTO: 1.6 K/UL (ref 1–4.8)
LYMPHOCYTES # BLD AUTO: 1.7 K/UL (ref 1–4.8)
LYMPHOCYTES NFR BLD: 24.5 % (ref 18–48)
LYMPHOCYTES NFR BLD: 26.6 % (ref 18–48)
MAGNESIUM SERPL-MCNC: 2.1 MG/DL (ref 1.6–2.6)
MCH RBC QN AUTO: 29.8 PG (ref 27–31)
MCH RBC QN AUTO: 30.1 PG (ref 27–31)
MCHC RBC AUTO-ENTMCNC: 34.3 G/DL (ref 32–36)
MCHC RBC AUTO-ENTMCNC: 35 G/DL (ref 32–36)
MCV RBC AUTO: 85 FL (ref 82–98)
MCV RBC AUTO: 88 FL (ref 82–98)
MONOCYTES # BLD AUTO: 0.6 K/UL (ref 0.3–1)
MONOCYTES # BLD AUTO: 0.7 K/UL (ref 0.3–1)
MONOCYTES NFR BLD: 9.3 % (ref 4–15)
MONOCYTES NFR BLD: 9.8 % (ref 4–15)
NEUTROPHILS # BLD AUTO: 3.8 K/UL (ref 1.8–7.7)
NEUTROPHILS # BLD AUTO: 4.1 K/UL (ref 1.8–7.7)
NEUTROPHILS NFR BLD: 60.7 % (ref 38–73)
NEUTROPHILS NFR BLD: 61.6 % (ref 38–73)
NRBC BLD-RTO: 0 /100 WBC
NRBC BLD-RTO: 0 /100 WBC
OHS CV RV/LV RATIO: 0.74 CM
PHOSPHATE SERPL-MCNC: 3.8 MG/DL (ref 2.7–4.5)
PISA TR MAX VEL: 3 M/S
PLATELET # BLD AUTO: 159 K/UL (ref 150–450)
PLATELET # BLD AUTO: 162 K/UL (ref 150–450)
PMV BLD AUTO: 10.4 FL (ref 9.2–12.9)
PMV BLD AUTO: 10.5 FL (ref 9.2–12.9)
POTASSIUM SERPL-SCNC: 3.9 MMOL/L (ref 3.5–5.1)
PROT SERPL-MCNC: 7.8 G/DL (ref 6–8.4)
PROTHROMBIN TIME: 11.6 SEC (ref 9–12.5)
PV MEAN GRADIENT: 3 MMHG
PV PEAK GRADIENT: 5 MMHG
PV PEAK VELOCITY: 1.12 M/S
RA MAJOR: 5.37 CM
RA PRESSURE ESTIMATED: 3 MMHG
RA WIDTH: 3.6 CM
RBC # BLD AUTO: 5.45 M/UL (ref 4.6–6.2)
RBC # BLD AUTO: 5.47 M/UL (ref 4.6–6.2)
RIGHT VENTRICLE DIASTOLIC BASEL DIMENSION: 3.4 CM
RIGHT VENTRICULAR END-DIASTOLIC DIMENSION: 3.41 CM
RV TB RVSP: 6 MMHG
SODIUM SERPL-SCNC: 139 MMOL/L (ref 136–145)
STJ: 2.67 CM
TR MAX PG: 36 MMHG
TRICUSPID ANNULAR PLANE SYSTOLIC EXCURSION: 1.36 CM
TV REST PULMONARY ARTERY PRESSURE: 39 MMHG
WBC # BLD AUTO: 6.23 K/UL (ref 3.9–12.7)
WBC # BLD AUTO: 6.6 K/UL (ref 3.9–12.7)
Z-SCORE OF LEFT VENTRICULAR DIMENSION IN END DIASTOLE: 0.46
Z-SCORE OF LEFT VENTRICULAR DIMENSION IN END SYSTOLE: 1.5

## 2025-02-07 PROCEDURE — 36415 COLL VENOUS BLD VENIPUNCTURE: CPT | Mod: HCNC | Performed by: SPECIALIST

## 2025-02-07 PROCEDURE — 63600175 PHARM REV CODE 636 W HCPCS: Mod: HCNC | Performed by: INTERNAL MEDICINE

## 2025-02-07 PROCEDURE — 80053 COMPREHEN METABOLIC PANEL: CPT | Mod: HCNC | Performed by: NURSE PRACTITIONER

## 2025-02-07 PROCEDURE — 36415 COLL VENOUS BLD VENIPUNCTURE: CPT | Mod: HCNC,XB | Performed by: INTERNAL MEDICINE

## 2025-02-07 PROCEDURE — 85730 THROMBOPLASTIN TIME PARTIAL: CPT | Mod: 91,HCNC | Performed by: INTERNAL MEDICINE

## 2025-02-07 PROCEDURE — 85730 THROMBOPLASTIN TIME PARTIAL: CPT | Mod: 91,HCNC | Performed by: SPECIALIST

## 2025-02-07 PROCEDURE — 85025 COMPLETE CBC W/AUTO DIFF WBC: CPT | Mod: 91,HCNC | Performed by: INTERNAL MEDICINE

## 2025-02-07 PROCEDURE — 36415 COLL VENOUS BLD VENIPUNCTURE: CPT | Mod: HCNC | Performed by: NURSE PRACTITIONER

## 2025-02-07 PROCEDURE — 99291 CRITICAL CARE FIRST HOUR: CPT | Mod: HCNC,25,, | Performed by: INTERNAL MEDICINE

## 2025-02-07 PROCEDURE — 21400001 HC TELEMETRY ROOM: Mod: HCNC

## 2025-02-07 PROCEDURE — 25000003 PHARM REV CODE 250: Mod: HCNC | Performed by: SPECIALIST

## 2025-02-07 PROCEDURE — 84100 ASSAY OF PHOSPHORUS: CPT | Mod: HCNC | Performed by: NURSE PRACTITIONER

## 2025-02-07 PROCEDURE — 25000003 PHARM REV CODE 250: Mod: HCNC | Performed by: INTERNAL MEDICINE

## 2025-02-07 PROCEDURE — 83735 ASSAY OF MAGNESIUM: CPT | Mod: HCNC | Performed by: NURSE PRACTITIONER

## 2025-02-07 PROCEDURE — 85025 COMPLETE CBC W/AUTO DIFF WBC: CPT | Mod: HCNC | Performed by: NURSE PRACTITIONER

## 2025-02-07 PROCEDURE — 85610 PROTHROMBIN TIME: CPT | Mod: HCNC | Performed by: INTERNAL MEDICINE

## 2025-02-07 PROCEDURE — 85730 THROMBOPLASTIN TIME PARTIAL: CPT | Mod: HCNC | Performed by: INTERNAL MEDICINE

## 2025-02-07 RX ORDER — FUROSEMIDE 10 MG/ML
40 INJECTION INTRAMUSCULAR; INTRAVENOUS DAILY
Status: DISCONTINUED | OUTPATIENT
Start: 2025-02-07 | End: 2025-02-08 | Stop reason: HOSPADM

## 2025-02-07 RX ORDER — MUPIROCIN 20 MG/G
OINTMENT TOPICAL 2 TIMES DAILY
Status: DISCONTINUED | OUTPATIENT
Start: 2025-02-07 | End: 2025-02-08 | Stop reason: HOSPADM

## 2025-02-07 RX ORDER — LANOLIN ALCOHOL/MO/W.PET/CERES
400 CREAM (GRAM) TOPICAL DAILY
Status: DISCONTINUED | OUTPATIENT
Start: 2025-02-07 | End: 2025-02-08 | Stop reason: HOSPADM

## 2025-02-07 RX ORDER — HYDRALAZINE HYDROCHLORIDE 20 MG/ML
10 INJECTION INTRAMUSCULAR; INTRAVENOUS EVERY 6 HOURS PRN
Status: DISCONTINUED | OUTPATIENT
Start: 2025-02-07 | End: 2025-02-08 | Stop reason: HOSPADM

## 2025-02-07 RX ORDER — POTASSIUM CHLORIDE 20 MEQ/1
20 TABLET, EXTENDED RELEASE ORAL DAILY
Status: DISCONTINUED | OUTPATIENT
Start: 2025-02-07 | End: 2025-02-08 | Stop reason: HOSPADM

## 2025-02-07 RX ORDER — HEPARIN SODIUM,PORCINE/D5W 25000/250
0-40 INTRAVENOUS SOLUTION INTRAVENOUS CONTINUOUS
Status: DISCONTINUED | OUTPATIENT
Start: 2025-02-07 | End: 2025-02-08

## 2025-02-07 RX ADMIN — MUPIROCIN: 20 OINTMENT TOPICAL at 10:02

## 2025-02-07 RX ADMIN — MUPIROCIN: 20 OINTMENT TOPICAL at 09:02

## 2025-02-07 RX ADMIN — HYDRALAZINE HYDROCHLORIDE 10 MG: 20 INJECTION, SOLUTION INTRAMUSCULAR; INTRAVENOUS at 05:02

## 2025-02-07 RX ADMIN — HEPARIN SODIUM 12 UNITS/KG/HR: 10000 INJECTION, SOLUTION INTRAVENOUS at 09:02

## 2025-02-07 RX ADMIN — Medication 400 MG: at 09:02

## 2025-02-07 RX ADMIN — POTASSIUM CHLORIDE 20 MEQ: 1500 TABLET, EXTENDED RELEASE ORAL at 09:02

## 2025-02-07 RX ADMIN — FUROSEMIDE 40 MG: 10 INJECTION, SOLUTION INTRAMUSCULAR; INTRAVENOUS at 09:02

## 2025-02-07 NOTE — SUBJECTIVE & OBJECTIVE
Past Medical History:   Diagnosis Date    A-fib     Diastolic dysfunction 12/2017    Hypertriglyceridemia        Past Surgical History:   Procedure Laterality Date    ABDOMINAL SURGERY      appendix removal in [place] 3 years ago    CATARACT EXTRACTION, BILATERAL         Review of patient's allergies indicates:  No Known Allergies    No current facility-administered medications on file prior to encounter.     Current Outpatient Medications on File Prior to Encounter   Medication Sig    albuterol (ACCUNEB) 0.63 mg/3 mL Nebu Take 3 mLs (0.63 mg total) by nebulization every 6 (six) hours as needed (wheezing or shortness of breath). (Patient not taking: Reported on 2/6/2025)    albuterol-ipratropium (DUO-NEB) 2.5 mg-0.5 mg/3 mL nebulizer solution Take by nebulization as needed for Wheezing. (Patient not taking: Reported on 2/6/2025)    BREO ELLIPTA 200-25 mcg/dose DsDv diskus inhaler Inhale 1 puff into the lungs as needed. (Patient not taking: Reported on 2/6/2025)    cyproheptadine (PERIACTIN) 4 mg tablet Take 1 tablet (4 mg total) by mouth 2 (two) times daily as needed (decreased appetite). (Patient not taking: Reported on 2/6/2025)    FLUAD QUAD 2023-24,65Y UP,,PF, 60 mcg (15 mcg x 4)/0.5 mL Syrg  (Patient not taking: Reported on 2/6/2025)    furosemide (LASIX) 20 MG tablet Take 2 tablets (40 mg total) by mouth once daily. (Patient not taking: Reported on 2/6/2025)    meloxicam (MOBIC) 15 MG tablet Take 1 tablet (15 mg total) by mouth daily as needed for Pain. (Patient not taking: Reported on 2/6/2025)    vibegron (GEMTESA) 75 mg Tab Take 1 tablet (75 mg total) by mouth once daily.     Family History    None       Tobacco Use    Smoking status: Former     Current packs/day: 0.00     Average packs/day: 1 pack/day for 61.0 years (61.0 ttl pk-yrs)     Types: Cigarettes     Start date: 1949     Quit date: 2010     Years since quitting: 15.1     Passive exposure: Past    Smokeless tobacco: Never   Substance and Sexual  Activity    Alcohol use: No     Alcohol/week: 0.0 standard drinks of alcohol    Drug use: No    Sexual activity: Not Currently     Review of Systems   Constitutional: Positive for malaise/fatigue.   HENT: Negative.     Eyes: Negative.    Cardiovascular:  Positive for chest pain (atypical).   Respiratory: Negative.     Hematologic/Lymphatic: Bruises/bleeds easily.   Skin: Negative.    Musculoskeletal:  Positive for arthritis and back pain.   Gastrointestinal: Negative.    Genitourinary: Negative.    Neurological: Negative.    Psychiatric/Behavioral: Negative.     Allergic/Immunologic: Negative.      Objective:     Vital Signs (Most Recent):  Temp: 97.9 °F (36.6 °C) (02/07/25 1100)  Pulse: 77 (02/07/25 1100)  Resp: (!) 24 (02/07/25 1100)  BP: (!) 157/70 (02/07/25 1000)  SpO2: (!) 94 % (02/07/25 1100) Vital Signs (24h Range):  Temp:  [97.3 °F (36.3 °C)-97.9 °F (36.6 °C)] 97.9 °F (36.6 °C)  Pulse:  [] 77  Resp:  [14-47] 24  SpO2:  [92 %-97 %] 94 %  BP: (119-209)/() 157/70     Weight: 55.9 kg (123 lb 3.8 oz)  Body mass index is 26.67 kg/m².    SpO2: (!) 94 %         Intake/Output Summary (Last 24 hours) at 2/7/2025 1219  Last data filed at 2/7/2025 1100  Gross per 24 hour   Intake 150 ml   Output 650 ml   Net -500 ml       Lines/Drains/Airways       Peripheral Intravenous Line  Duration                  Peripheral IV - Single Lumen 02/06/25 1616 20 G Right Antecubital <1 day                     Physical Exam  Vitals and nursing note reviewed.   Constitutional:       Appearance: Normal appearance.      Comments: Elderly   HENT:      Head: Normocephalic and atraumatic.   Eyes:      Pupils: Pupils are equal, round, and reactive to light.   Cardiovascular:      Rate and Rhythm: Bradycardia present. Rhythm regularly irregular.      Heart sounds: S1 normal and S2 normal. No murmur heard.     Comments: HR 50's-60's  Pulmonary:      Effort: Pulmonary effort is normal.   Musculoskeletal:      Right lower leg: No  "edema.      Left lower leg: No edema.   Skin:     General: Skin is warm and dry.   Neurological:      Mental Status: He is oriented to person, place, and time.   Psychiatric:         Mood and Affect: Mood normal.         Behavior: Behavior normal.          Significant Labs: CMP   Recent Labs   Lab 02/06/25  1338 02/07/25  0241    139   K 4.3 3.9    105   CO2 23 23    109   BUN 19 20   CREATININE 1.0 0.9   CALCIUM 9.1 9.6   PROT 7.5 7.8   ALBUMIN 3.8 4.0   BILITOT 0.5 0.9   ALKPHOS 103 87   AST 24 24   ALT 12 13   ANIONGAP 8 11   , CBC   Recent Labs   Lab 02/06/25  1343 02/07/25  0241 02/07/25  0849   WBC 5.04 6.60 6.23   HGB 15.2 16.3 16.4   HCT 44.0 46.6 47.8    159 162   , Troponin No results for input(s): "TROPONINIHS" in the last 48 hours., and All pertinent lab results from the last 24 hours have been reviewed.    Significant Imaging: Echocardiogram: Transthoracic echo (TTE) complete (Cupid Only):   Results for orders placed or performed during the hospital encounter of 02/06/25   Echo   Result Value Ref Range    BSA 1.5 m2    LVOT stroke volume 58.7 cm3    LVIDd 4.6 3.5 - 6.0 cm    LV Systolic Volume 42.95 mL    LV Systolic Volume Index 29.4 mL/m2    LVIDs 3.3 2.1 - 4.0 cm    LV ESV A2C 71.85 mL    LV Diastolic Volume 99.12 mL    LV Diastolic Volume Index 67.89 mL/m2    Left Ventricular End Systolic Volume by Teichholz Method 42.95 mL    Left Ventricular End Diastolic Volume by Teichholz Method 99.12 mL    IVS 0.9 0.6 - 1.1 cm    LVOT diameter 2.0 cm    LVOT area 3.1 cm2    FS 28.3 28 - 44 %    Left Ventricle Relative Wall Thickness 0.30 cm    PW 0.7 0.6 - 1.1 cm    LV mass 117.9 g    LV Mass Index 80.8 g/m2    TR Max Nazario 3.0 m/s    IVRT 122 msec    LVOT peak nazario 0.9 m/s    Left Ventricular Outflow Tract Mean Velocity 0.53 cm/s    Left Ventricular Outflow Tract Mean Gradient 1.41 mmHg    RV- matute basal diam 3.4 cm    RVOT peak VTI 21.0 cm    TAPSE 1.36 cm    RV/LV Ratio 0.74 cm    LA " size 3.8 cm    Left Atrium Minor Axis 6.0 cm    Left Atrium Major Axis 6.2 cm    RA Major Axis 5.37 cm    AV mean gradient 3 mmHg    AV peak gradient 2 mmHg    Ao peak maxim 0.7 m/s    Ao VTI 24.7 cm    LVOT peak VTI 18.7 cm    AV valve area 2.4 cm²    AV Velocity Ratio 1.29     AV index (prosthetic) 0.76     NORA by Velocity Ratio 4.0 cm²    Triscuspid Valve Regurgitation Peak Gradient 36 mmHg    PV mean gradient 3 mmHg    PV PEAK VELOCITY 1.12 m/s    PV peak gradient 6 mmHg    RVOT peak maxim 1.23 m/s    Ao root annulus 2.73 cm    STJ 2.67 cm    Ascending aorta 3.22 cm    IVC diameter 1.65 cm    ZLVIDS 1.50     ZLVIDD 0.46     LA area A2C 23.82 cm2    RVDD 3.41 cm    CARLOS 55 mL/m2    LA Vol 81 cm3    LA WIDTH 4.1 cm    RA Width 3.6 cm   , EKG: reviewed, and X-Ray: CXR: X-Ray Chest 1 View (CXR):   Results for orders placed or performed during the hospital encounter of 02/06/25   X-Ray Chest 1 View    Narrative    EXAMINATION:  XR CHEST 1 VIEW    CLINICAL HISTORY:  Chest pain, unspecified    FINDINGS:  Single view of the chest.  Comparison 04/22/2024    Cardiac silhouette is enlarged but stable.  The lungs demonstrate no evidence of active disease.  No evidence of pleural effusion or pneumothorax.  Bones appear intact.  Moderate degenerative changes and moderate atherosclerotic disease.      Impression    No acute process seen.      Electronically signed by: Scott Soler MD  Date:    02/06/2025  Time:    13:02    and X-Ray Chest PA and Lateral (CXR): No results found for this visit on 02/06/25.

## 2025-02-07 NOTE — HOSPITAL COURSE
2/7 Asymptomatic. No hemodynamic issues. Did have a HR drop in to the high 30s without hemodynamic compromise

## 2025-02-07 NOTE — PLAN OF CARE
[facility] - Intensive Care (Hospital)  Initial Discharge Assessment       Primary Care Provider: Penny Watson DO    Admission Diagnosis: Bradycardia [R00.1]  NSTEMI (non-ST elevated myocardial infarction) [I21.4]  Chest pain [R07.9]  Closed compression fracture of thoracic vertebra, initial encounter [S22.000A]  Hypertension, unspecified type [I10]  Congestive heart failure, unspecified HF chronicity, unspecified heart failure type [I50.9]    Admission Date: 2/6/2025  Expected Discharge Date:     Transition of Care Barriers: Other (see comments) (non English speaking)    Payor: [employer] MEDICARE / Plan: [employer] HMO PPO SPECIAL NEEDS / Product Type: Medicare Advantage /     Extended Emergency Contact Information  Primary Emergency Contact: Marino Hines   [place]  Mobile Phone: [phone]  Relation: Daughter  Secondary Emergency Contact: Regine Hines  Mobile Phone: [phone]  Relation: Daughter    Discharge Plan A: Home Health         [id number] - [place] - [address] [facility] [address] [place] [address] [address]  Phone: [phone] Fax: [phone]    [id number] - [place] - [address] [facility] [address] [facility] & [address] [facility] [address]  Phone: [phone] Fax: [phone]      Initial Assessment (most recent)       Adult Discharge Assessment - 02/07/25 1417          Discharge Assessment    Assessment Type Discharge Planning Assessment     Confirmed/corrected address, phone number and insurance Yes     Confirmed Demographics Correct on Facesheet     Source of Information family     When was your last doctors appointment? 02/06/25   urology    Communicated MARLY with patient/caregiver Date not available/Unable to determine     Reason For Admission bradycardia     People in Home child(cyrus), adult     Facility Arrived From: home     Do you expect to  return to your current living situation? Yes     Do you have help at home or someone to help you manage your care at home? Yes     Who are your caregiver(s) and their phone number(s)? daughter, Marino Weiner     Prior to hospitilization cognitive status: Alert/Oriented     Current cognitive status: Unable to Assess     Walking or Climbing Stairs Difficulty yes     Walking or Climbing Stairs ambulation difficulty, requires equipment     Mobility Management cane at times     Dressing/Bathing Difficulty no     Home Accessibility wheelchair accessible     Home Layout Able to live on 1st floor     Equipment Currently Used at Home cane, straight     Readmission within 30 days? No     Patient currently being followed by outpatient case management? No     Do you currently have service(s) that help you manage your care at home? No     Do you take prescription medications? Yes     Do you have prescription coverage? Yes     Coverage MCR     Do you have any problems affording any of your prescribed medications? No     Is the patient taking medications as prescribed? yes     Who is going to help you get home at discharge? family     How do you get to doctors appointments? family or friend will provide     Are you on dialysis? No     Do you take coumadin? No     Discharge Plan A Home Health     DME Needed Upon Discharge  none     Discharge Plan discussed with: Adult children     Transition of Care Barriers Other (see comments)   non English speaking                  Anticipated DC dispo: home health   Prior Level of Function: independent with ADLs   People in home: daughter and 2 sons      Comments:  CM met with patient and daughter at bedside to introduce role and discuss discharge planning. Adult children will be help at home and can provide transport at time of discharge. Confirmed demographics, insurance, and emergency contacts. CM discharge needs depends on hospital progress. CM will continue following to assist with other needs.  Discharge plan has been determined by review of patient's clinical status, future medical and therapeutic needs, and coverage/benefits for post-acute care in coordination with multidisciplinary team members.      Daughter would like home health at discharge.

## 2025-02-07 NOTE — CONSULTS
[facility]  [facility]  Consult Note    Patient Name: Long Albarran  MRN: [id number]  Admission Date: 2/6/2025  Hospital Length of Stay: 1 days  Attending Physician: Cady Turner MD   Primary Care Provider: Penny Watson DO           Patient information was obtained from relative(s), past medical records, and ER records.     Inpatient consult to Hospitalist  Consult performed by: Aryan Roger MD  Consult ordered by: Sandy Solomon NP        Subjective:     Principal Problem: Bradycardia    Chief Complaint:   Chief Complaint   Patient presents with    Chest Pain     Mid sternal chest pain x one day with movement. Also c/o back pain. Denies SOB.         HPI: 89 y.o. male, comorbid conditions include Bradycardia, CHF, HLD and AFIB. Presented to the ED for evaluation of CP which onset gradually within the past day. Pain radiates to the lower back. Denies any recent injuries or traumas. Pt denies any blood thinners. Symptoms are intermittent and moderate in severity. No mitigating or exacerbating factors reported. Associated sxs include SOB and palpitations.  Admitted to the ICU and monitored overnight. Cardiology recommended continuation of heparin gtt and tele monitoring. Patient stable for transfer to the floor.    Past Medical History:   Diagnosis Date    A-fib     Diastolic dysfunction 12/2017    Hypertriglyceridemia        Past Surgical History:   Procedure Laterality Date    ABDOMINAL SURGERY      appendix removal in [place] 3 years ago    CATARACT EXTRACTION, BILATERAL         Review of patient's allergies indicates:  No Known Allergies    No current facility-administered medications on file prior to encounter.     Current Outpatient Medications on File Prior to Encounter   Medication Sig    albuterol (ACCUNEB) 0.63 mg/3 mL Nebu Take 3 mLs (0.63 mg total) by nebulization every 6 (six) hours as needed (wheezing or shortness of breath). (Patient not taking:  Reported on 2/6/2025)    albuterol-ipratropium (DUO-NEB) 2.5 mg-0.5 mg/3 mL nebulizer solution Take by nebulization as needed for Wheezing. (Patient not taking: Reported on 2/6/2025)    BREO ELLIPTA 200-25 mcg/dose DsDv diskus inhaler Inhale 1 puff into the lungs as needed. (Patient not taking: Reported on 2/6/2025)    cyproheptadine (PERIACTIN) 4 mg tablet Take 1 tablet (4 mg total) by mouth 2 (two) times daily as needed (decreased appetite). (Patient not taking: Reported on 2/6/2025)    FLUAD QUAD 2023-24,65Y UP,,PF, 60 mcg (15 mcg x 4)/0.5 mL Syrg  (Patient not taking: Reported on 2/6/2025)    furosemide (LASIX) 20 MG tablet Take 2 tablets (40 mg total) by mouth once daily. (Patient not taking: Reported on 2/6/2025)    meloxicam (MOBIC) 15 MG tablet Take 1 tablet (15 mg total) by mouth daily as needed for Pain. (Patient not taking: Reported on 2/6/2025)    vibegron (GEMTESA) 75 mg Tab Take 1 tablet (75 mg total) by mouth once daily.     Family History    None       Tobacco Use    Smoking status: Former     Current packs/day: 0.00     Average packs/day: 1 pack/day for 61.0 years (61.0 ttl pk-yrs)     Types: Cigarettes     Start date: 1949     Quit date: 2010     Years since quitting: 15.1     Passive exposure: Past    Smokeless tobacco: Never   Substance and Sexual Activity    Alcohol use: No     Alcohol/week: 0.0 standard drinks of alcohol    Drug use: No    Sexual activity: Not Currently     Review of Systems   Unable to perform ROS: Other   Respiratory:  Positive for shortness of breath.      Objective:     Vital Signs (Most Recent):  Temp: 97.9 °F (36.6 °C) (02/07/25 1500)  Pulse: (!) 53 (02/07/25 1610)  Resp: 17 (02/07/25 1610)  BP: (!) 175/79 (02/07/25 1610)  SpO2: 96 % (02/07/25 1610) Vital Signs (24h Range):  Temp:  [97.3 °F (36.3 °C)-97.9 °F (36.6 °C)] 97.9 °F (36.6 °C)  Pulse:  [] 53  Resp:  [14-47] 17  SpO2:  [92 %-98 %] 96 %  BP: (119-209)/() 175/79     Weight: 55.9 kg (123 lb 3.8 oz)  Body  mass index is 26.67 kg/m².     Physical Exam  HENT:      Head: Normocephalic and atraumatic.   Cardiovascular:      Rate and Rhythm: Regular rhythm. Bradycardia present.      Heart sounds: No murmur heard.  Pulmonary:      Effort: Pulmonary effort is normal. No respiratory distress.      Breath sounds: Normal breath sounds. No wheezing.   Abdominal:      General: Bowel sounds are normal. There is no distension.      Palpations: Abdomen is soft.      Tenderness: There is no abdominal tenderness.   Musculoskeletal:         General: No swelling.   Skin:     General: Skin is warm and dry.   Neurological:      Mental Status: He is alert. Mental status is at baseline.          Significant Labs: All pertinent labs within the past 24 hours have been reviewed.  Recent Lab Results  (Last 5 results in the past 24 hours)        02/07/25  1621   02/07/25  1332   02/07/25  0849   02/07/25  0831   02/07/25  0241        Albumin         4.0       ALP         87       ALT         13       Anion Gap         11       Ao root annulus       2.73         Ascending aorta       3.22         Ao peak maxim       0.7         Ao VTI       24.7         PTT 67.1  Comment: Refer to local heparin nomogram for intensity/dose specific   therapeutic   range.     82.8  Comment: Refer to local heparin nomogram for intensity/dose specific   therapeutic   range.     27.8  Comment: Refer to local heparin nomogram for intensity/dose specific   therapeutic   range.             AST         24       AV valve area       2.4         NORA by Velocity Ratio       4.0         AV mean gradient       3         AV index (prosthetic)       0.76         AV peak gradient       2         AV Velocity Ratio       1.29         Baso #     0.05     0.07       Basophil %     0.8     1.1       BILIRUBIN TOTAL         0.9  Comment: For infants and newborns, interpretation of results should be based  on gestational age, weight and in agreement with  clinical  observations.    Premature Infant recommended reference ranges:  Up to 24 hours.............<8.0 mg/dL  Up to 48 hours............<12.0 mg/dL  3-5 days..................<15.0 mg/dL  6-29 days.................<15.0 mg/dL         BSA       1.5         BUN         20       Calcium         9.6       Chloride         105       CO2         23       Creatinine         0.9       Left Ventricle Relative Wall Thickness       0.30         Differential Method     Automated     Automated       eGFR         >60       Eos #     0.2     0.2       Eos %     2.4     2.7       FS       28.3         Glucose         109       Gran # (ANC)     3.8     4.1       Gran %     60.7     61.6       Hematocrit     47.8     46.6       Hemoglobin     16.4     16.3       Immature Grans (Abs)     0.01  Comment: Mild elevation in immature granulocytes is non specific and   can be seen in a variety of conditions including stress response,   acute inflammation, trauma and pregnancy. Correlation with other   laboratory and clinical findings is essential.       0.02  Comment: Mild elevation in immature granulocytes is non specific and   can be seen in a variety of conditions including stress response,   acute inflammation, trauma and pregnancy. Correlation with other   laboratory and clinical findings is essential.         Immature Granulocytes     0.2     0.3       INR     1.0  Comment: Coumadin Therapy:  2.0 - 3.0 for INR for all indicators except mechanical heart valves  and antiphospholipid syndromes which should use 2.5 - 3.5.             IVC diameter       1.65         IVRT       122         IVSd       0.9         LA WIDTH       4.1         LA area A2C       23.82         Left Atrium Major Axis       6.2         Left Atrium Minor Axis       6.0         LA size       3.8         LA Vol       81         LA vol index       55         LVOT area       3.1         LV EDV BP       99.12         LV Diastolic Volume Index       67.89         Left  Ventricular End Diastolic Volume by Teichholz Method       99.12         Left Ventricular End Systolic Volume by Teichholz Method       42.95         LV ESV A2C       71.85         LVIDd       4.6         LVIDs       3.3         LV mass       117.9         LV Mass Index       80.8         Left Ventricular Outflow Tract Mean Gradient       1.41         Left Ventricular Outflow Tract Mean Velocity       0.53         LVOT diameter       2.0         LVOT peak nazario       0.9         LVOT stroke volume       58.7         LVOT peak VTI       18.7         LV ESV BP       42.95         LV Systolic Volume Index       29.4         Lymph #     1.7     1.6       Lymph %     26.6     24.5       Magnesium          2.1       MCH     30.1     29.8       MCHC     34.3     35.0       MCV     88     85       Mono #     0.6     0.7       Mono %     9.3     9.8       MPV     10.4     10.5       nRBC     0     0       Phosphorus Level         3.8       Platelet Count     162     159       Potassium         3.9       PROTEIN TOTAL         7.8       PT     11.6           PV mean gradient       3         PV peak gradient       5         PV PEAK VELOCITY       1.12         PW       0.7         RA Major Axis       5.37         Est. RA pres       3         RA Width       3.6         RBC     5.45     5.47       RDW     12.7     12.6       RV TB RVSP       6         RV/LV Ratio       0.74         RVDD       3.41         RV- matute basal diam       3.4         RVOT peak nazario       1.23         RVOT peak VTI       21.0         Sodium         139       STJ       2.67         TAPSE       1.36         Triscuspid Valve Regurgitation Peak Gradient       36         TR Max Nazario       3.0         TV resting pulmonary artery pressure       39         WBC     6.23     6.60       ZLVIDD       0.46         ZLVIDS       1.50                                Significant Imaging: I have reviewed all pertinent imaging results/findings within the past 24  hours.  Assessment/Plan:     * Bradycardia  -continue telemetry monitoring  -possible PPM        Chronic diastolic congestive heart failure  Patient has Diastolic (HFpEF) heart failure that is Chronic. On presentation their CHF was well compensated. Most recent BNP and echo results are listed below.  Recent Labs     02/06/25  1338   *     Latest ECHO  Results for orders placed during the hospital encounter of 02/06/25    Echo    Interpretation Summary    Left Ventricle: The left ventricle is normal in size. Normal wall thickness. There is moderate eccentric hypertrophy. There is normal systolic function with a visually estimated ejection fraction of 55 - 60%. Unable to assess diastolic function due to atrial fibrillation.    Right Ventricle: Normal right ventricular cavity size. Wall thickness is normal. Systolic function is borderline low.    Left Atrium: Left atrium is severely dilated.    Aortic Valve: There is moderate aortic valve sclerosis. There is mild annular calcification present.    Mitral Valve: Mildly calcified leaflets. There is mild mitral annular calcification present.    Tricuspid Valve: There is mild regurgitation.    Pulmonary Artery: The estimated pulmonary artery systolic pressure is 39 mmHg.    IVC/SVC: Normal venous pressure at 3 mmHg.    Current Heart Failure Medications  furosemide injection 40 mg, Daily, Intravenous    Plan  - Monitor strict I&Os and daily weights.    - Place on telemetry  - Low sodium diet  - Place on fluid restriction of 1.5 L.   - Cardiology has been consulted  - The patient's volume status is at their baseline  -         Atrial fibrillation  Patient has paroxysmal (<7 days) atrial fibrillation. Patient is currently in atrial fibrillation. YPAEK2DYTc Score: 2. The patients heart rate in the last 24 hours is as follows:  Pulse  Min: 41  Max: 100     Antiarrhythmics       Anticoagulants  heparin 25,000 units in dextrose 5% 250 mL (100 units/mL) infusion LOW  INTENSITY nomogram - [facility], Continuous, Intravenous  heparin 25,000 units in dextrose 5% (100 units/ml) IV bolus from bag LOW INTENSITY nomogram - [facility], As needed (PRN), Intravenous  heparin 25,000 units in dextrose 5% (100 units/ml) IV bolus from bag LOW INTENSITY nomogram - [facility], As needed (PRN), Intravenous    Plan  - Replete lytes with a goal of K>4, Mg >2  - Patient is anticoagulated, see medications listed above.  - Patient's afib is currently controlled  -           VTE Risk Mitigation (From admission, onward)           Ordered     heparin 25,000 units in dextrose 5% (100 units/ml) IV bolus from bag LOW INTENSITY nomogram - [facility]  As needed (PRN)        Question:  Heparin Infusion Adjustment (DO NOT MODIFY ANSWER)  Answer:  [email or web address]\Images\Pharmacy\HeparinInfusions\heparin LOW INTENSITY nomogram for [facility] [id number]    02/07/25 0836     heparin 25,000 units in dextrose 5% (100 units/ml) IV bolus from bag LOW INTENSITY nomogram - [facility]  As needed (PRN)        Question:  Heparin Infusion Adjustment (DO NOT MODIFY ANSWER)  Answer:  [email or web address]\epic\Images\Pharmacy\HeparinInfusions\heparin LOW INTENSITY nomogram for [facility] [id number]    02/07/25 0836     heparin 25,000 units in dextrose 5% 250 mL (100 units/mL) infusion LOW INTENSITY nomogram - [facility]  Continuous        Question:  Begin at (units/kg/hr)  Answer:  12    02/07/25 0836     IP VTE HIGH RISK PATIENT  Once         02/06/25 1830     Place sequential compression device  Until discontinued         02/06/25 1830                    Thank you for your consult. I will follow-up with patient. Please contact us if you have any additional questions.    Aryan Roger MD  Department of Hospital Medicine   [facility] - Intensive Care ([facility])

## 2025-02-07 NOTE — H&P
[facility] - Emergency Dept.  Critical Care Medicine  History & Physical    Patient Name: Long Albarran  MRN: [id number]  Admission Date: 2/6/2025  Hospital Length of Stay: 0 days  Code Status: Prior  Attending Physician: Ruth Ivy DO   Primary Care Provider: Penny Watson DO   Principal Problem: <principal problem not specified>    Subjective:     HPI:  Poor historian  Daughter at the bedside refuses to let us use language line    I asked her to ask if he has any complaints and he has none. She appears in no distress lying comfortable on RA.    Hospital/ICU Course:  No notes on file     Past Medical History:   Diagnosis Date    A-fib     Diastolic dysfunction 12/2017    Hypertriglyceridemia        Past Surgical History:   Procedure Laterality Date    ABDOMINAL SURGERY      appendix removal in [place] 3 years ago    CATARACT EXTRACTION, BILATERAL         Review of patient's allergies indicates:  No Known Allergies    Family History    None       Tobacco Use    Smoking status: Former     Current packs/day: 0.00     Average packs/day: 1 pack/day for 61.0 years (61.0 ttl pk-yrs)     Types: Cigarettes     Start date: 1949     Quit date: 2010     Years since quitting: 15.1     Passive exposure: Past    Smokeless tobacco: Never   Substance and Sexual Activity    Alcohol use: No     Alcohol/week: 0.0 standard drinks of alcohol    Drug use: No    Sexual activity: Not Currently         Review of Systems   Unable to perform ROS: Acuity of condition     Objective:     Vital Signs (Most Recent):  Temp: 98 °F (36.7 °C) (02/06/25 1133)  Pulse: 76 (02/06/25 1747)  Resp: 17 (02/06/25 1648)  BP: (!) 158/77 (02/06/25 1133)  SpO2: 97 % (02/06/25 1133) Vital Signs (24h Range):  Temp:  [98 °F (36.7 °C)] 98 °F (36.7 °C)  Pulse:  [43-76] 76  Resp:  [17-20] 17  SpO2:  [97 %] 97 %  BP: (101-158)/(45-77) 158/77     Weight: 56.7 kg (125 lb)  Body mass index is 27.05 kg/m².    No intake or output data in the 24 hours ending 02/06/25  1826     Physical Exam  Vitals and nursing note reviewed.   Constitutional:       General: He is not in acute distress.     Appearance: He is not ill-appearing, toxic-appearing or diaphoretic.   HENT:      Head: Normocephalic.   Eyes:      Pupils: Pupils are equal, round, and reactive to light.   Cardiovascular:      Rate and Rhythm: Normal rate.      Pulses: Normal pulses.   Pulmonary:      Effort: Pulmonary effort is normal.   Abdominal:      Palpations: Abdomen is soft.   Musculoskeletal:      Cervical back: Normal range of motion.   Neurological:      General: No focal deficit present.      Mental Status: He is alert.          Vents:       Lines/Drains/Airways       Peripheral Intravenous Line  Duration                  Peripheral IV - Single Lumen 02/06/25 1616 20 G Right Antecubital <1 day                    Significant Labs:    CBC/Anemia Profile:  Recent Labs   Lab 02/06/25  1343   WBC 5.04   HGB 15.2   HCT 44.0      MCV 88   RDW 12.9        Chemistries:  Recent Labs   Lab 02/06/25  1338      K 4.3      CO2 23   BUN 19   CREATININE 1.0   CALCIUM 9.1   ALBUMIN 3.8   PROT 7.5   BILITOT 0.5   ALKPHOS 103   ALT 12   AST 24       All pertinent labs within the past 24 hours have been reviewed.    Significant Imaging:   I have reviewed all pertinent imaging results/findings within the past 24 hours.  Assessment/Plan:     Cardiac/Vascular  Chronic diastolic congestive heart failure  Patient has Diastolic (HFpEF) heart failure that is Chronic. On presentation their CHF was well compensated. Most recent BNP and echo results are listed below.  Recent Labs     02/06/25  1338   *     Latest ECHO  Results for orders placed during the hospital encounter of 02/22/24    Echo    Interpretation Summary    Left Ventricle: The left ventricle is normal in size. Mildly increased wall thickness. There is mild concentric hypertrophy. There is normal systolic function with a visually estimated ejection  fraction of 55 - 60%. There is diastolic dysfunction but grade cannot be determined.    Right Ventricle: Normal right ventricular cavity size. Wall thickness is normal. Right ventricle wall motion  is normal. Systolic function is normal.    Left Atrium: Left atrium is moderately dilated.    Mitral Valve: There is mild regurgitation.    Tricuspid Valve: There is mild regurgitation.    Pulmonary Artery: The estimated pulmonary artery systolic pressure is 42 mmHg.    IVC/SVC: Normal venous pressure at 3 mmHg.    Current Heart Failure Medications       Plan  - Monitor strict I&Os and daily weights.    - Place on telemetry  - Low sodium diet  - Place on fluid restriction of 1 L.   - Cardiology has been consulted  - The patient's volume status is at their baseline        Bradycardia  Cardiology following  No hemodynamic issues  Asymptomatic  CURRENT HR 70      Atrial fibrillation  Patient has long standing persistent (>12 months) atrial fibrillation. Patient is currently in atrial fibrillation. BUKHT1UNNl Score: 2. The patients heart rate in the last 24 hours is as follows:  Pulse  Min: 43  Max: 76     Plan  - Replete lytes with a goal of K>4, Mg >2  - Patient is on ASA. Heparin per cards  - Patient's afib is currently controlled      Critical Care Time: 35 minutes  Critical secondary to Patient has a condition that poses threat to life and bodily function: BRADYCARDIA    Critical care was time spent personally by me on the following activities: development of treatment plan with patient or surrogate and bedside caregivers, discussions with consultants, evaluation of patient's response to treatment, examination of patient, ordering and performing treatments and interventions, ordering and review of laboratory studies, ordering and review of radiographic studies, pulse oximetry, re-evaluation of patient's condition. This critical care time did not overlap with that of any other provider or involve time for any  procedures.     Cady Turner MD  Critical Care Medicine  [facility] - Emergency Dept.

## 2025-02-07 NOTE — SUBJECTIVE & OBJECTIVE
Past Medical History:   Diagnosis Date    A-fib     Diastolic dysfunction 12/2017    Hypertriglyceridemia        Past Surgical History:   Procedure Laterality Date    ABDOMINAL SURGERY      appendix removal in [place] 3 years ago    CATARACT EXTRACTION, BILATERAL         Review of patient's allergies indicates:  No Known Allergies    No current facility-administered medications on file prior to encounter.     Current Outpatient Medications on File Prior to Encounter   Medication Sig    albuterol (ACCUNEB) 0.63 mg/3 mL Nebu Take 3 mLs (0.63 mg total) by nebulization every 6 (six) hours as needed (wheezing or shortness of breath). (Patient not taking: Reported on 2/6/2025)    albuterol-ipratropium (DUO-NEB) 2.5 mg-0.5 mg/3 mL nebulizer solution Take by nebulization as needed for Wheezing. (Patient not taking: Reported on 2/6/2025)    BREO ELLIPTA 200-25 mcg/dose DsDv diskus inhaler Inhale 1 puff into the lungs as needed. (Patient not taking: Reported on 2/6/2025)    cyproheptadine (PERIACTIN) 4 mg tablet Take 1 tablet (4 mg total) by mouth 2 (two) times daily as needed (decreased appetite). (Patient not taking: Reported on 2/6/2025)    FLUAD QUAD 2023-24,65Y UP,,PF, 60 mcg (15 mcg x 4)/0.5 mL Syrg  (Patient not taking: Reported on 2/6/2025)    furosemide (LASIX) 20 MG tablet Take 2 tablets (40 mg total) by mouth once daily. (Patient not taking: Reported on 2/6/2025)    meloxicam (MOBIC) 15 MG tablet Take 1 tablet (15 mg total) by mouth daily as needed for Pain. (Patient not taking: Reported on 2/6/2025)    vibegron (GEMTESA) 75 mg Tab Take 1 tablet (75 mg total) by mouth once daily.     Family History    None       Tobacco Use    Smoking status: Former     Current packs/day: 0.00     Average packs/day: 1 pack/day for 61.0 years (61.0 ttl pk-yrs)     Types: Cigarettes     Start date: 1949     Quit date: 2010     Years since quitting: 15.1     Passive exposure: Past    Smokeless tobacco: Never   Substance and Sexual  Activity    Alcohol use: No     Alcohol/week: 0.0 standard drinks of alcohol    Drug use: No    Sexual activity: Not Currently     Review of Systems   Unable to perform ROS: Other   Respiratory:  Positive for shortness of breath.      Objective:     Vital Signs (Most Recent):  Temp: 97.9 °F (36.6 °C) (02/07/25 1500)  Pulse: (!) 53 (02/07/25 1610)  Resp: 17 (02/07/25 1610)  BP: (!) 175/79 (02/07/25 1610)  SpO2: 96 % (02/07/25 1610) Vital Signs (24h Range):  Temp:  [97.3 °F (36.3 °C)-97.9 °F (36.6 °C)] 97.9 °F (36.6 °C)  Pulse:  [] 53  Resp:  [14-47] 17  SpO2:  [92 %-98 %] 96 %  BP: (119-209)/() 175/79     Weight: 55.9 kg (123 lb 3.8 oz)  Body mass index is 26.67 kg/m².     Physical Exam  HENT:      Head: Normocephalic and atraumatic.   Cardiovascular:      Rate and Rhythm: Regular rhythm. Bradycardia present.      Heart sounds: No murmur heard.  Pulmonary:      Effort: Pulmonary effort is normal. No respiratory distress.      Breath sounds: Normal breath sounds. No wheezing.   Abdominal:      General: Bowel sounds are normal. There is no distension.      Palpations: Abdomen is soft.      Tenderness: There is no abdominal tenderness.   Musculoskeletal:         General: No swelling.   Skin:     General: Skin is warm and dry.   Neurological:      Mental Status: He is alert. Mental status is at baseline.          Significant Labs: All pertinent labs within the past 24 hours have been reviewed.  Recent Lab Results  (Last 5 results in the past 24 hours)        02/07/25  1621   02/07/25  1332   02/07/25  0849   02/07/25  0831   02/07/25  0241        Albumin         4.0       ALP         87       ALT         13       Anion Gap         11       Ao root annulus       2.73         Ascending aorta       3.22         Ao peak maxim       0.7         Ao VTI       24.7         PTT 67.1  Comment: Refer to local heparin nomogram for intensity/dose specific   therapeutic   range.     82.8  Comment: Refer to local heparin  nomogram for intensity/dose specific   therapeutic   range.     27.8  Comment: Refer to local heparin nomogram for intensity/dose specific   therapeutic   range.             AST         24       AV valve area       2.4         NORA by Velocity Ratio       4.0         AV mean gradient       3         AV index (prosthetic)       0.76         AV peak gradient       2         AV Velocity Ratio       1.29         Baso #     0.05     0.07       Basophil %     0.8     1.1       BILIRUBIN TOTAL         0.9  Comment: For infants and newborns, interpretation of results should be based  on gestational age, weight and in agreement with clinical  observations.    Premature Infant recommended reference ranges:  Up to 24 hours.............<8.0 mg/dL  Up to 48 hours............<12.0 mg/dL  3-5 days..................<15.0 mg/dL  6-29 days.................<15.0 mg/dL         BSA       1.5         BUN         20       Calcium         9.6       Chloride         105       CO2         23       Creatinine         0.9       Left Ventricle Relative Wall Thickness       0.30         Differential Method     Automated     Automated       eGFR         >60       Eos #     0.2     0.2       Eos %     2.4     2.7       FS       28.3         Glucose         109       Gran # (ANC)     3.8     4.1       Gran %     60.7     61.6       Hematocrit     47.8     46.6       Hemoglobin     16.4     16.3       Immature Grans (Abs)     0.01  Comment: Mild elevation in immature granulocytes is non specific and   can be seen in a variety of conditions including stress response,   acute inflammation, trauma and pregnancy. Correlation with other   laboratory and clinical findings is essential.       0.02  Comment: Mild elevation in immature granulocytes is non specific and   can be seen in a variety of conditions including stress response,   acute inflammation, trauma and pregnancy. Correlation with other   laboratory and clinical findings is essential.          Immature Granulocytes     0.2     0.3       INR     1.0  Comment: Coumadin Therapy:  2.0 - 3.0 for INR for all indicators except mechanical heart valves  and antiphospholipid syndromes which should use 2.5 - 3.5.             IVC diameter       1.65         IVRT       122         IVSd       0.9         LA WIDTH       4.1         LA area A2C       23.82         Left Atrium Major Axis       6.2         Left Atrium Minor Axis       6.0         LA size       3.8         LA Vol       81         LA vol index       55         LVOT area       3.1         LV EDV BP       99.12         LV Diastolic Volume Index       67.89         Left Ventricular End Diastolic Volume by Teichholz Method       99.12         Left Ventricular End Systolic Volume by Teichholz Method       42.95         LV ESV A2C       71.85         LVIDd       4.6         LVIDs       3.3         LV mass       117.9         LV Mass Index       80.8         Left Ventricular Outflow Tract Mean Gradient       1.41         Left Ventricular Outflow Tract Mean Velocity       0.53         LVOT diameter       2.0         LVOT peak maxim       0.9         LVOT stroke volume       58.7         LVOT peak VTI       18.7         LV ESV BP       42.95         LV Systolic Volume Index       29.4         Lymph #     1.7     1.6       Lymph %     26.6     24.5       Magnesium          2.1       MCH     30.1     29.8       MCHC     34.3     35.0       MCV     88     85       Mono #     0.6     0.7       Mono %     9.3     9.8       MPV     10.4     10.5       nRBC     0     0       Phosphorus Level         3.8       Platelet Count     162     159       Potassium         3.9       PROTEIN TOTAL         7.8       PT     11.6           PV mean gradient       3         PV peak gradient       5         PV PEAK VELOCITY       1.12         PW       0.7         RA Major Axis       5.37         Est. RA pres       3         RA Width       3.6         RBC     5.45     5.47       RDW     12.7      12.6       RV TB RVSP       6         RV/LV Ratio       0.74         RVDD       3.41         RV- matute basal diam       3.4         RVOT peak nazario       1.23         RVOT peak VTI       21.0         Sodium         139       STJ       2.67         TAPSE       1.36         Triscuspid Valve Regurgitation Peak Gradient       36         TR Max Nazario       3.0         TV resting pulmonary artery pressure       39         WBC     6.23     6.60       ZLVIDD       0.46         ZLVIDS       1.50                                Significant Imaging: I have reviewed all pertinent imaging results/findings within the past 24 hours.

## 2025-02-07 NOTE — SUBJECTIVE & OBJECTIVE
Past Medical History:   Diagnosis Date    A-fib     Diastolic dysfunction 12/2017    Hypertriglyceridemia        Past Surgical History:   Procedure Laterality Date    ABDOMINAL SURGERY      appendix removal in [place] 3 years ago    CATARACT EXTRACTION, BILATERAL         Review of patient's allergies indicates:  No Known Allergies    Family History    None       Tobacco Use    Smoking status: Former     Current packs/day: 0.00     Average packs/day: 1 pack/day for 61.0 years (61.0 ttl pk-yrs)     Types: Cigarettes     Start date: 1949     Quit date: 2010     Years since quitting: 15.1     Passive exposure: Past    Smokeless tobacco: Never   Substance and Sexual Activity    Alcohol use: No     Alcohol/week: 0.0 standard drinks of alcohol    Drug use: No    Sexual activity: Not Currently         Review of Systems   Unable to perform ROS: Acuity of condition     Objective:     Vital Signs (Most Recent):  Temp: 98 °F (36.7 °C) (02/06/25 1133)  Pulse: 76 (02/06/25 1747)  Resp: 17 (02/06/25 1648)  BP: (!) 158/77 (02/06/25 1133)  SpO2: 97 % (02/06/25 1133) Vital Signs (24h Range):  Temp:  [98 °F (36.7 °C)] 98 °F (36.7 °C)  Pulse:  [43-76] 76  Resp:  [17-20] 17  SpO2:  [97 %] 97 %  BP: (101-158)/(45-77) 158/77     Weight: 56.7 kg (125 lb)  Body mass index is 27.05 kg/m².    No intake or output data in the 24 hours ending 02/06/25 1826     Physical Exam  Vitals and nursing note reviewed.   Constitutional:       General: He is not in acute distress.     Appearance: He is not ill-appearing, toxic-appearing or diaphoretic.   HENT:      Head: Normocephalic.   Eyes:      Pupils: Pupils are equal, round, and reactive to light.   Cardiovascular:      Rate and Rhythm: Normal rate.      Pulses: Normal pulses.   Pulmonary:      Effort: Pulmonary effort is normal.   Abdominal:      Palpations: Abdomen is soft.   Musculoskeletal:      Cervical back: Normal range of motion.   Neurological:      General: No focal deficit present.       Mental Status: He is alert.          Vents:       Lines/Drains/Airways       Peripheral Intravenous Line  Duration                  Peripheral IV - Single Lumen 02/06/25 1616 20 G Right Antecubital <1 day                    Significant Labs:    CBC/Anemia Profile:  Recent Labs   Lab 02/06/25  1343   WBC 5.04   HGB 15.2   HCT 44.0      MCV 88   RDW 12.9        Chemistries:  Recent Labs   Lab 02/06/25  1338      K 4.3      CO2 23   BUN 19   CREATININE 1.0   CALCIUM 9.1   ALBUMIN 3.8   PROT 7.5   BILITOT 0.5   ALKPHOS 103   ALT 12   AST 24       All pertinent labs within the past 24 hours have been reviewed.    Significant Imaging:   I have reviewed all pertinent imaging results/findings within the past 24 hours.

## 2025-02-07 NOTE — PLAN OF CARE
Problem: Adult Inpatient Plan of Care  Goal: Plan of Care Review  Outcome: Progressing  Goal: Patient-Specific Goal (Individualized)  Outcome: Progressing  Goal: Absence of Hospital-Acquired Illness or Injury  Outcome: Progressing  Goal: Optimal Comfort and Wellbeing  Outcome: Progressing  Goal: Readiness for Transition of Care  Outcome: Progressing     Problem: Fall Injury Risk  Goal: Absence of Fall and Fall-Related Injury  Outcome: Progressing     Problem: Skin Injury Risk Increased  Goal: Skin Health and Integrity  Outcome: Progressing     Patient remained free of falls/injury/trauma throughout shift. Patient AAOx4. Neuro status unchanged. Patient remains in Afib on tele monitoring. Patient denies chest pain and/or SOB. Patient continues to void per the urinal at the bedside. Patient OOB with walker and assistance x1 and up to toilet for BM this morning. Fall risk precautions reviewed and maintained with patient and patient's daughter at the bedside. Language line use attempted, however, patient unable to hear interrupter, due to his decreased hearing. POC reviewed with patient.

## 2025-02-07 NOTE — HPI
HPI obtained from daughter who served as     Mr. Hines is a 89 year old male patient whose current medical conditions include hyperlipidemia, PAF/PAFL, DJD, and chronic diastolic CHF who presented to [facility] ED yesterday due to chest pain that onset shortly PTA and radiated to his lower back. Associated symptoms included brief bilateral hand numbness, SOB, and palpitations. He denied any associated fever, chills, near syncope, or syncope. Initial workup in ED revealed troponin of 0.059>0.061 as well as bradycardia (HR dipped to 30's), and patient was subsequently given atropine and admitted to ICU for further evaluation and treatment. Cardiology consulted to assist with management. Patient seen and examined today with daughter present. Feels ok. Still has some back pain. Denies SOB. No other CV complaints. No history of near syncope or syncope. HR in 50's-60's during exam. Followed in clinic by Dr. Guzman. Daughter reports PPM has been discussed in the past. Prior monitor in 2/24 with no high grade block or pauses reported.

## 2025-02-07 NOTE — HPI
Poor historian  Daughter at the bedside refuses to let us use language line    I asked her to ask if he has any complaints and he has none. She appears in no distress lying comfortable on RA.

## 2025-02-07 NOTE — CONSULTS
[facility] (Hospital)  Cardiology  Consult Note    Patient Name: Long Albarran  MRN: [id number]  Admission Date: 2/6/2025  Hospital Length of Stay: 1 days  Code Status: Full Code   Attending Provider: Cady Turner MD   Consulting Provider: Catherine Barron PA-C  Primary Care Physician: Penny Watson DO  Principal Problem:Bradycardia    Patient information was obtained from patient, relative(s), past medical records, and ER records.     Inpatient consult to Cardiology  Consult performed by: Catherine Barron PA-C  Consult ordered by: Cady Turner MD        Subjective:     Chief Complaint:  Back pain/CP    HPI:   HPI obtained from daughter who served as     Mr. Hines is a 89 year old male patient whose current medical conditions include hyperlipidemia, PAF/PAFL, DJD, and chronic diastolic CHF who presented to [facility] ED yesterday due to chest pain that onset shortly PTA and radiated to his lower back. Associated symptoms included brief bilateral hand numbness, SOB, and palpitations. He denied any associated fever, chills, near syncope, or syncope. Initial workup in ED revealed troponin of 0.059>0.061 as well as bradycardia (HR dipped to 30's), and patient was subsequently given atropine and admitted to ICU for further evaluation and treatment. Cardiology consulted to assist with management. Patient seen and examined today with daughter present. Feels ok. Still has some back pain. Denies SOB. No other CV complaints. No history of near syncope or syncope. HR in 50's-60's during exam. Followed in clinic by Dr. Guzman. Daughter reports PPM has been discussed in the past. Prior monitor in 2/24 with no high grade block or pauses reported.            Past Medical History:   Diagnosis Date    A-fib     Diastolic dysfunction 12/2017    Hypertriglyceridemia        Past Surgical History:   Procedure Laterality Date    ABDOMINAL SURGERY      appendix removal in [place] 3 years ago     CATARACT EXTRACTION, BILATERAL         Review of patient's allergies indicates:  No Known Allergies    No current facility-administered medications on file prior to encounter.     Current Outpatient Medications on File Prior to Encounter   Medication Sig    albuterol (ACCUNEB) 0.63 mg/3 mL Nebu Take 3 mLs (0.63 mg total) by nebulization every 6 (six) hours as needed (wheezing or shortness of breath). (Patient not taking: Reported on 2/6/2025)    albuterol-ipratropium (DUO-NEB) 2.5 mg-0.5 mg/3 mL nebulizer solution Take by nebulization as needed for Wheezing. (Patient not taking: Reported on 2/6/2025)    BREO ELLIPTA 200-25 mcg/dose DsDv diskus inhaler Inhale 1 puff into the lungs as needed. (Patient not taking: Reported on 2/6/2025)    cyproheptadine (PERIACTIN) 4 mg tablet Take 1 tablet (4 mg total) by mouth 2 (two) times daily as needed (decreased appetite). (Patient not taking: Reported on 2/6/2025)    FLUAD QUAD 2023-24,65Y UP,,PF, 60 mcg (15 mcg x 4)/0.5 mL Syrg  (Patient not taking: Reported on 2/6/2025)    furosemide (LASIX) 20 MG tablet Take 2 tablets (40 mg total) by mouth once daily. (Patient not taking: Reported on 2/6/2025)    meloxicam (MOBIC) 15 MG tablet Take 1 tablet (15 mg total) by mouth daily as needed for Pain. (Patient not taking: Reported on 2/6/2025)    vibegron (GEMTESA) 75 mg Tab Take 1 tablet (75 mg total) by mouth once daily.     Family History    None       Tobacco Use    Smoking status: Former     Current packs/day: 0.00     Average packs/day: 1 pack/day for 61.0 years (61.0 ttl pk-yrs)     Types: Cigarettes     Start date: 1949     Quit date: 2010     Years since quitting: 15.1     Passive exposure: Past    Smokeless tobacco: Never   Substance and Sexual Activity    Alcohol use: No     Alcohol/week: 0.0 standard drinks of alcohol    Drug use: No    Sexual activity: Not Currently     Review of Systems   Constitutional: Positive for malaise/fatigue.   HENT: Negative.     Eyes: Negative.     Cardiovascular:  Positive for chest pain (atypical).   Respiratory: Negative.     Hematologic/Lymphatic: Bruises/bleeds easily.   Skin: Negative.    Musculoskeletal:  Positive for arthritis and back pain.   Gastrointestinal: Negative.    Genitourinary: Negative.    Neurological: Negative.    Psychiatric/Behavioral: Negative.     Allergic/Immunologic: Negative.      Objective:     Vital Signs (Most Recent):  Temp: 97.9 °F (36.6 °C) (02/07/25 1100)  Pulse: 77 (02/07/25 1100)  Resp: (!) 24 (02/07/25 1100)  BP: (!) 157/70 (02/07/25 1000)  SpO2: (!) 94 % (02/07/25 1100) Vital Signs (24h Range):  Temp:  [97.3 °F (36.3 °C)-97.9 °F (36.6 °C)] 97.9 °F (36.6 °C)  Pulse:  [] 77  Resp:  [14-47] 24  SpO2:  [92 %-97 %] 94 %  BP: (119-209)/() 157/70     Weight: 55.9 kg (123 lb 3.8 oz)  Body mass index is 26.67 kg/m².    SpO2: (!) 94 %         Intake/Output Summary (Last 24 hours) at 2/7/2025 1219  Last data filed at 2/7/2025 1100  Gross per 24 hour   Intake 150 ml   Output 650 ml   Net -500 ml       Lines/Drains/Airways       Peripheral Intravenous Line  Duration                  Peripheral IV - Single Lumen 02/06/25 1616 20 G Right Antecubital <1 day                     Physical Exam  Vitals and nursing note reviewed.   Constitutional:       Appearance: Normal appearance.      Comments: Elderly   HENT:      Head: Normocephalic and atraumatic.   Eyes:      Pupils: Pupils are equal, round, and reactive to light.   Cardiovascular:      Rate and Rhythm: Bradycardia present. Rhythm regularly irregular.      Heart sounds: S1 normal and S2 normal. No murmur heard.     Comments: HR 50's-60's  Pulmonary:      Effort: Pulmonary effort is normal.   Musculoskeletal:      Right lower leg: No edema.      Left lower leg: No edema.   Skin:     General: Skin is warm and dry.   Neurological:      Mental Status: He is oriented to person, place, and time.   Psychiatric:         Mood and Affect: Mood normal.         Behavior: Behavior  "normal.          Significant Labs: CMP   Recent Labs   Lab 02/06/25  1338 02/07/25  0241    139   K 4.3 3.9    105   CO2 23 23    109   BUN 19 20   CREATININE 1.0 0.9   CALCIUM 9.1 9.6   PROT 7.5 7.8   ALBUMIN 3.8 4.0   BILITOT 0.5 0.9   ALKPHOS 103 87   AST 24 24   ALT 12 13   ANIONGAP 8 11   , CBC   Recent Labs   Lab 02/06/25  1343 02/07/25  0241 02/07/25  0849   WBC 5.04 6.60 6.23   HGB 15.2 16.3 16.4   HCT 44.0 46.6 47.8    159 162   , Troponin No results for input(s): "TROPONINIHS" in the last 48 hours., and All pertinent lab results from the last 24 hours have been reviewed.    Significant Imaging: Echocardiogram: Transthoracic echo (TTE) complete (Cupid Only):   Results for orders placed or performed during the hospital encounter of 02/06/25   Echo   Result Value Ref Range    BSA 1.5 m2    LVOT stroke volume 58.7 cm3    LVIDd 4.6 3.5 - 6.0 cm    LV Systolic Volume 42.95 mL    LV Systolic Volume Index 29.4 mL/m2    LVIDs 3.3 2.1 - 4.0 cm    LV ESV A2C 71.85 mL    LV Diastolic Volume 99.12 mL    LV Diastolic Volume Index 67.89 mL/m2    Left Ventricular End Systolic Volume by Teichholz Method 42.95 mL    Left Ventricular End Diastolic Volume by Teichholz Method 99.12 mL    IVS 0.9 0.6 - 1.1 cm    LVOT diameter 2.0 cm    LVOT area 3.1 cm2    FS 28.3 28 - 44 %    Left Ventricle Relative Wall Thickness 0.30 cm    PW 0.7 0.6 - 1.1 cm    LV mass 117.9 g    LV Mass Index 80.8 g/m2    TR Max Nazario 3.0 m/s    IVRT 122 msec    LVOT peak nazario 0.9 m/s    Left Ventricular Outflow Tract Mean Velocity 0.53 cm/s    Left Ventricular Outflow Tract Mean Gradient 1.41 mmHg    RV- matute basal diam 3.4 cm    RVOT peak VTI 21.0 cm    TAPSE 1.36 cm    RV/LV Ratio 0.74 cm    LA size 3.8 cm    Left Atrium Minor Axis 6.0 cm    Left Atrium Major Axis 6.2 cm    RA Major Axis 5.37 cm    AV mean gradient 3 mmHg    AV peak gradient 2 mmHg    Ao peak nazario 0.7 m/s    Ao VTI 24.7 cm    LVOT peak VTI 18.7 cm    AV valve area " 2.4 cm²    AV Velocity Ratio 1.29     AV index (prosthetic) 0.76     NORA by Velocity Ratio 4.0 cm²    Triscuspid Valve Regurgitation Peak Gradient 36 mmHg    PV mean gradient 3 mmHg    PV PEAK VELOCITY 1.12 m/s    PV peak gradient 6 mmHg    RVOT peak maxim 1.23 m/s    Ao root annulus 2.73 cm    STJ 2.67 cm    Ascending aorta 3.22 cm    IVC diameter 1.65 cm    ZLVIDS 1.50     ZLVIDD 0.46     LA area A2C 23.82 cm2    RVDD 3.41 cm    CARLOS 55 mL/m2    LA Vol 81 cm3    LA WIDTH 4.1 cm    RA Width 3.6 cm   , EKG: reviewed, and X-Ray: CXR: X-Ray Chest 1 View (CXR):   Results for orders placed or performed during the hospital encounter of 02/06/25   X-Ray Chest 1 View    Narrative    EXAMINATION:  XR CHEST 1 VIEW    CLINICAL HISTORY:  Chest pain, unspecified    FINDINGS:  Single view of the chest.  Comparison 04/22/2024    Cardiac silhouette is enlarged but stable.  The lungs demonstrate no evidence of active disease.  No evidence of pleural effusion or pneumothorax.  Bones appear intact.  Moderate degenerative changes and moderate atherosclerotic disease.      Impression    No acute process seen.      Electronically signed by: Scott Soler MD  Date:    02/06/2025  Time:    13:02    and X-Ray Chest PA and Lateral (CXR): No results found for this visit on 02/06/25.  Assessment and Plan:   Patient who presents with CP/back pain and bradycardia. Stable during exam, monitor HR. At risk for PPM. Continue heparin gtt for now, re-discuss risks/benefits of AC with patient/family prior to d/c.     * Bradycardia  -Chronic per chart, HR in 50's-60's during exam  -Continue to monitor  -Seems relatively asymptomatic-no near syncope or syncope  -Avoid AV andrew agents  -Check TTE  -At risk for PPM, has been discussed in the past     Chronic diastolic congestive heart failure  -Continue gentle IV diuresis  -TTE pending  -Strict I's/O's    Atrial fibrillation  -Currently in afib/aflutter with controlled rate  -Avoid AV andrew agents given  bradycardia  -Heparin gitt initiated; on ASA only as OP; risks/benefits of AC discussed with patient and family, ? Intermittent hematuria  -Will continue to monitor        VTE Risk Mitigation (From admission, onward)           Ordered     heparin 25,000 units in dextrose 5% (100 units/ml) IV bolus from bag LOW INTENSITY nomogram - [facility]  As needed (PRN)        Question:  Heparin Infusion Adjustment (DO NOT MODIFY ANSWER)  Answer:  [email or web address]\epic\Images\Pharmacy\HeparinInfusions\heparin LOW INTENSITY nomogram for [facility] [id number]    02/07/25 0836     heparin 25,000 units in dextrose 5% (100 units/ml) IV bolus from bag LOW INTENSITY nomogram - [facility]  As needed (PRN)        Question:  Heparin Infusion Adjustment (DO NOT MODIFY ANSWER)  Answer:  \\[email or web address]\epic\Images\Pharmacy\HeparinInfusions\heparin LOW INTENSITY nomogram for [facility] [id number].pdf    02/07/25 0836     heparin 25,000 units in dextrose 5% 250 mL (100 units/mL) infusion LOW INTENSITY nomogram - [facility]  Continuous        Question:  Begin at (units/kg/hr)  Answer:  12    02/07/25 0836     IP VTE HIGH RISK PATIENT  Once         02/06/25 1830     Place sequential compression device  Until discontinued         02/06/25 1830                    Thank you for your consult. I will follow-up with patient. Please contact us if you have any additional questions.    Catherine Barron PA-C  Cardiology   [facility] ([facility])

## 2025-02-07 NOTE — SUBJECTIVE & OBJECTIVE
Objective:     Vital Signs (Most Recent):  Temp: 97.9 °F (36.6 °C) (02/07/25 1100)  Pulse: 77 (02/07/25 1100)  Resp: (!) 24 (02/07/25 1100)  BP: (!) 157/70 (02/07/25 1000)  SpO2: (!) 94 % (02/07/25 1100) Vital Signs (24h Range):  Temp:  [97.3 °F (36.3 °C)-97.9 °F (36.6 °C)] 97.9 °F (36.6 °C)  Pulse:  [] 77  Resp:  [14-47] 24  SpO2:  [92 %-97 %] 94 %  BP: (119-209)/() 157/70     Weight: 55.9 kg (123 lb 3.8 oz)  Body mass index is 26.67 kg/m².      Intake/Output Summary (Last 24 hours) at 2/7/2025 1158  Last data filed at 2/7/2025 1100  Gross per 24 hour   Intake 150 ml   Output 650 ml   Net -500 ml        Physical Exam  Vitals and nursing note reviewed.   HENT:      Head: Normocephalic.   Eyes:      Pupils: Pupils are equal, round, and reactive to light.   Cardiovascular:      Rate and Rhythm: Rhythm irregular.      Pulses: Normal pulses.   Pulmonary:      Breath sounds: Normal breath sounds.   Abdominal:      Palpations: Abdomen is soft.   Musculoskeletal:      Cervical back: Normal range of motion.   Neurological:      General: No focal deficit present.      Mental Status: He is alert.           Review of Systems   Unable to perform ROS: Acuity of condition       Vents:       Lines/Drains/Airways       Peripheral Intravenous Line  Duration                  Peripheral IV - Single Lumen 02/06/25 1616 20 G Right Antecubital <1 day                    Significant Labs:    CBC/Anemia Profile:  Recent Labs   Lab 02/06/25  1343 02/07/25  0241 02/07/25  0849   WBC 5.04 6.60 6.23   HGB 15.2 16.3 16.4   HCT 44.0 46.6 47.8    159 162   MCV 88 85 88   RDW 12.9 12.6 12.7        Chemistries:  Recent Labs   Lab 02/06/25  1338 02/07/25  0241    139   K 4.3 3.9    105   CO2 23 23   BUN 19 20   CREATININE 1.0 0.9   CALCIUM 9.1 9.6   ALBUMIN 3.8 4.0   PROT 7.5 7.8   BILITOT 0.5 0.9   ALKPHOS 103 87   ALT 12 13   AST 24 24   MG  --  2.1   PHOS  --  3.8       All pertinent labs within the past 24 hours  have been reviewed.    Significant Imaging:  I have reviewed all pertinent imaging results/findings within the past 24 hours.

## 2025-02-07 NOTE — ASSESSMENT & PLAN NOTE
-Chronic per chart, HR in 50's-60's during exam  -Continue to monitor  -Seems relatively asymptomatic-no near syncope or syncope  -Avoid AV andrew agents  -Check TTE  -At risk for PPM, has been discussed in the past   
-Continue gentle IV diuresis  -TTE pending  -Strict I's/O's  
-Currently in afib/aflutter with controlled rate  -Avoid AV andrew agents given bradycardia  -Heparin gitt initiated; on ASA only as OP; risks/benefits of AC discussed with patient and family, ? Intermittent hematuria  -Will continue to monitor  
-continue telemetry monitoring  -possible PPM      
Cardiology following  No hemodynamic issues  Asymptomatic  CURRENT HR 70    
Cardiology following  No hemodynamic issues  Asymptomatic  CURRENT HR 70    2/7 No hemodynamic issues. HR did drop.    
Patient has Diastolic (HFpEF) heart failure that is Chronic. On presentation their CHF was well compensated. Most recent BNP and echo results are listed below.  Recent Labs     02/06/25  1338   *     Latest ECHO  Results for orders placed during the hospital encounter of 02/06/25    Echo    Interpretation Summary    Left Ventricle: The left ventricle is normal in size. Normal wall thickness. There is moderate eccentric hypertrophy. There is normal systolic function with a visually estimated ejection fraction of 55 - 60%. Unable to assess diastolic function due to atrial fibrillation.    Right Ventricle: Normal right ventricular cavity size. Wall thickness is normal. Systolic function is borderline low.    Left Atrium: Left atrium is severely dilated.    Aortic Valve: There is moderate aortic valve sclerosis. There is mild annular calcification present.    Mitral Valve: Mildly calcified leaflets. There is mild mitral annular calcification present.    Tricuspid Valve: There is mild regurgitation.    Pulmonary Artery: The estimated pulmonary artery systolic pressure is 39 mmHg.    IVC/SVC: Normal venous pressure at 3 mmHg.    Current Heart Failure Medications  furosemide injection 40 mg, Daily, Intravenous    Plan  - Monitor strict I&Os and daily weights.    - Place on telemetry  - Low sodium diet  - Place on fluid restriction of 1.5 L.   - Cardiology has been consulted  - The patient's volume status is at their baseline  -       
Patient has Diastolic (HFpEF) heart failure that is Chronic. On presentation their CHF was well compensated. Most recent BNP and echo results are listed below.  Recent Labs     02/06/25  1338   *     Latest ECHO  Results for orders placed during the hospital encounter of 02/22/24    Echo    Interpretation Summary    Left Ventricle: The left ventricle is normal in size. Mildly increased wall thickness. There is mild concentric hypertrophy. There is normal systolic function with a visually estimated ejection fraction of 55 - 60%. There is diastolic dysfunction but grade cannot be determined.    Right Ventricle: Normal right ventricular cavity size. Wall thickness is normal. Right ventricle wall motion  is normal. Systolic function is normal.    Left Atrium: Left atrium is moderately dilated.    Mitral Valve: There is mild regurgitation.    Tricuspid Valve: There is mild regurgitation.    Pulmonary Artery: The estimated pulmonary artery systolic pressure is 42 mmHg.    IVC/SVC: Normal venous pressure at 3 mmHg.    Current Heart Failure Medications       Plan  - Monitor strict I&Os and daily weights.    - Place on telemetry  - Low sodium diet  - Place on fluid restriction of 1 L.   - Cardiology has been consulted  - The patient's volume status is at their baseline      
Patient has long standing persistent (>12 months) atrial fibrillation. Patient is currently in atrial fibrillation. [id number] Score: 2. The patients heart rate in the last 24 hours is as follows:  Pulse  Min: 43  Max: 76     Plan  - Replete lytes with a goal of K>4, Mg >2  - Patient is on ASA. Heparin per cards  - Patient's afib is currently controlled    
Patient has long standing persistent (>12 months) atrial fibrillation. Patient is currently in atrial fibrillation. [id number] Score: 2. The patients heart rate in the last 24 hours is as follows:  Pulse  Min: 41  Max: 100     Plan  - Replete lytes with a goal of K>4, Mg >2  - Patient is on ASA. Heparin per cards  - Patient's afib is currently controlled    2/7 Cardiology consulted in ED. Pending. Will re-consult.    
Patient has paroxysmal (<7 days) atrial fibrillation. Patient is currently in atrial fibrillation. [id number] Score: 2. The patients heart rate in the last 24 hours is as follows:  Pulse  Min: 41  Max: 100     Antiarrhythmics       Anticoagulants  heparin 25,000 units in dextrose 5% 250 mL (100 units/mL) infusion LOW INTENSITY nomogram - [facility], Continuous, Intravenous  heparin 25,000 units in dextrose 5% (100 units/ml) IV bolus from bag LOW INTENSITY nomogram - [facility], As needed (PRN), Intravenous  heparin 25,000 units in dextrose 5% (100 units/ml) IV bolus from bag LOW INTENSITY nomogram - [facility], As needed (PRN), Intravenous    Plan  - Replete lytes with a goal of K>4, Mg >2  - Patient is anticoagulated, see medications listed above.  - Patient's afib is currently controlled  -       
Improved

## 2025-02-07 NOTE — PLAN OF CARE
Patient doing wee. VSS, no c/o discomfort. Daughter at bedside. Eating well. Multiple BMs today. Ambulates well with cane and standby assist.

## 2025-02-07 NOTE — PROGRESS NOTES
[facility]ensive [facility] ([facility])  Critical Care Medicine  Progress Note    Patient Name: Long Albarran  MRN: [id number]  Admission Date: 2/6/2025  Hospital Length of Stay: 1 days  Code Status: Full Code  Attending Provider: Cady Turner MD  Primary Care Provider: Penny Watson DO   Principal Problem: Bradycardia    Subjective:     HPI:  Poor historian  Daughter at the bedside refuses to let us use language line    I asked her to ask if he has any complaints and he has none. She appears in no distress lying comfortable on RA.    Hospital/ICU Course:  2/7 Asymptomatic. No hemodynamic issues. Did have a HR drop in to the high 30s without hemodynamic compromise      Objective:     Vital Signs (Most Recent):  Temp: 97.9 °F (36.6 °C) (02/07/25 1100)  Pulse: 77 (02/07/25 1100)  Resp: (!) 24 (02/07/25 1100)  BP: (!) 157/70 (02/07/25 1000)  SpO2: (!) 94 % (02/07/25 1100) Vital Signs (24h Range):  Temp:  [97.3 °F (36.3 °C)-97.9 °F (36.6 °C)] 97.9 °F (36.6 °C)  Pulse:  [] 77  Resp:  [14-47] 24  SpO2:  [92 %-97 %] 94 %  BP: (119-209)/() 157/70     Weight: 55.9 kg (123 lb 3.8 oz)  Body mass index is 26.67 kg/m².      Intake/Output Summary (Last 24 hours) at 2/7/2025 1158  Last data filed at 2/7/2025 1100  Gross per 24 hour   Intake 150 ml   Output 650 ml   Net -500 ml        Physical Exam  Vitals and nursing note reviewed.   HENT:      Head: Normocephalic.   Eyes:      Pupils: Pupils are equal, round, and reactive to light.   Cardiovascular:      Rate and Rhythm: Rhythm irregular.      Pulses: Normal pulses.   Pulmonary:      Breath sounds: Normal breath sounds.   Abdominal:      Palpations: Abdomen is soft.   Musculoskeletal:      Cervical back: Normal range of motion.   Neurological:      General: No focal deficit present.      Mental Status: He is alert.           Review of Systems   Unable to perform ROS: Acuity of condition       Vents:       Lines/Drains/Airways       Peripheral Intravenous Line   "Duration                  Peripheral IV - Single Lumen 02/06/25 1616 20 G Right Antecubital <1 day                    Significant Labs:    CBC/Anemia Profile:  Recent Labs   Lab 02/06/25  1343 02/07/25  0241 02/07/25  0849   WBC 5.04 6.60 6.23   HGB 15.2 16.3 16.4   HCT 44.0 46.6 47.8    159 162   MCV 88 85 88   RDW 12.9 12.6 12.7        Chemistries:  Recent Labs   Lab 02/06/25  1338 02/07/25  0241    139   K 4.3 3.9    105   CO2 23 23   BUN 19 20   CREATININE 1.0 0.9   CALCIUM 9.1 9.6   ALBUMIN 3.8 4.0   PROT 7.5 7.8   BILITOT 0.5 0.9   ALKPHOS 103 87   ALT 12 13   AST 24 24   MG  --  2.1   PHOS  --  3.8       All pertinent labs within the past 24 hours have been reviewed.    Significant Imaging:  I have reviewed all pertinent imaging results/findings within the past 24 hours.    ABG  No results for input(s): "PH", "PO2", "PCO2", "HCO3", "BE" in the last 168 hours.  Assessment/Plan:     Cardiac/Vascular  * Bradycardia  Cardiology following  No hemodynamic issues  Asymptomatic  CURRENT HR 70    2/7 No hemodynamic issues. HR did drop.      Chronic diastolic congestive heart failure  Patient has Diastolic (HFpEF) heart failure that is Chronic. On presentation their CHF was well compensated. Most recent BNP and echo results are listed below.  Recent Labs     02/06/25  1338   *     Latest ECHO  Results for orders placed during the hospital encounter of 02/22/24    Echo    Interpretation Summary    Left Ventricle: The left ventricle is normal in size. Mildly increased wall thickness. There is mild concentric hypertrophy. There is normal systolic function with a visually estimated ejection fraction of 55 - 60%. There is diastolic dysfunction but grade cannot be determined.    Right Ventricle: Normal right ventricular cavity size. Wall thickness is normal. Right ventricle wall motion  is normal. Systolic function is normal.    Left Atrium: Left atrium is moderately dilated.    Mitral Valve: There " is mild regurgitation.    Tricuspid Valve: There is mild regurgitation.    Pulmonary Artery: The estimated pulmonary artery systolic pressure is 42 mmHg.    IVC/SVC: Normal venous pressure at 3 mmHg.    Current Heart Failure Medications       Plan  - Monitor strict I&Os and daily weights.    - Place on telemetry  - Low sodium diet  - Place on fluid restriction of 1 L.   - Cardiology has been consulted  - The patient's volume status is at their baseline        Atrial fibrillation  Patient has long standing persistent (>12 months) atrial fibrillation. Patient is currently in atrial fibrillation. [id number] Score: 2. The patients heart rate in the last 24 hours is as follows:  Pulse  Min: 41  Max: 100     Plan  - Replete lytes with a goal of K>4, Mg >2  - Patient is on ASA. Heparin per cards  - Patient's afib is currently controlled    2/7 Cardiology consulted in ED. Pending. Will re-consult.           Cady Turenr MD  Critical Care Medicine  [facility] Intensive [facility] ([facility])

## 2025-02-08 VITALS
HEART RATE: 67 BPM | TEMPERATURE: 98 F | BODY MASS INDEX: 24.2 KG/M2 | DIASTOLIC BLOOD PRESSURE: 64 MMHG | HEIGHT: 60 IN | SYSTOLIC BLOOD PRESSURE: 125 MMHG | RESPIRATION RATE: 18 BRPM | WEIGHT: 123.25 LBS | OXYGEN SATURATION: 92 %

## 2025-02-08 LAB
ANION GAP SERPL CALC-SCNC: 10 MMOL/L (ref 8–16)
APTT PPP: 43.8 SEC (ref 21–32)
BASOPHILS # BLD AUTO: 0.06 K/UL (ref 0–0.2)
BASOPHILS NFR BLD: 1 % (ref 0–1.9)
BUN SERPL-MCNC: 27 MG/DL (ref 10–30)
CALCIUM SERPL-MCNC: 9.6 MG/DL (ref 8.7–10.5)
CHLORIDE SERPL-SCNC: 103 MMOL/L (ref 95–110)
CO2 SERPL-SCNC: 26 MMOL/L (ref 23–29)
CREAT SERPL-MCNC: 1 MG/DL (ref 0.5–1.4)
DIFFERENTIAL METHOD BLD: NORMAL
EOSINOPHIL # BLD AUTO: 0.2 K/UL (ref 0–0.5)
EOSINOPHIL NFR BLD: 2.5 % (ref 0–8)
ERYTHROCYTE [DISTWIDTH] IN BLOOD BY AUTOMATED COUNT: 12.9 % (ref 11.5–14.5)
EST. GFR  (NO RACE VARIABLE): >60 ML/MIN/1.73 M^2
GLUCOSE SERPL-MCNC: 107 MG/DL (ref 70–110)
HCT VFR BLD AUTO: 52 % (ref 40–54)
HGB BLD-MCNC: 17.9 G/DL (ref 14–18)
IMM GRANULOCYTES # BLD AUTO: 0.02 K/UL (ref 0–0.04)
IMM GRANULOCYTES NFR BLD AUTO: 0.3 % (ref 0–0.5)
LYMPHOCYTES # BLD AUTO: 1.6 K/UL (ref 1–4.8)
LYMPHOCYTES NFR BLD: 26.9 % (ref 18–48)
MCH RBC QN AUTO: 29.6 PG (ref 27–31)
MCHC RBC AUTO-ENTMCNC: 34.4 G/DL (ref 32–36)
MCV RBC AUTO: 86 FL (ref 82–98)
MONOCYTES # BLD AUTO: 0.6 K/UL (ref 0.3–1)
MONOCYTES NFR BLD: 10.3 % (ref 4–15)
NEUTROPHILS # BLD AUTO: 3.5 K/UL (ref 1.8–7.7)
NEUTROPHILS NFR BLD: 59 % (ref 38–73)
NRBC BLD-RTO: 0 /100 WBC
OHS QRS DURATION: 104 MS
OHS QTC CALCULATION: 450 MS
PLATELET # BLD AUTO: 182 K/UL (ref 150–450)
PMV BLD AUTO: 10.8 FL (ref 9.2–12.9)
POTASSIUM SERPL-SCNC: 4.1 MMOL/L (ref 3.5–5.1)
RBC # BLD AUTO: 6.05 M/UL (ref 4.6–6.2)
SODIUM SERPL-SCNC: 139 MMOL/L (ref 136–145)
WBC # BLD AUTO: 5.94 K/UL (ref 3.9–12.7)

## 2025-02-08 PROCEDURE — 93010 ELECTROCARDIOGRAM REPORT: CPT | Mod: HCNC,,, | Performed by: INTERNAL MEDICINE

## 2025-02-08 PROCEDURE — 85730 THROMBOPLASTIN TIME PARTIAL: CPT | Mod: HCNC | Performed by: INTERNAL MEDICINE

## 2025-02-08 PROCEDURE — 25000003 PHARM REV CODE 250: Mod: HCNC | Performed by: INTERNAL MEDICINE

## 2025-02-08 PROCEDURE — 97162 PT EVAL MOD COMPLEX 30 MIN: CPT | Mod: HCNC

## 2025-02-08 PROCEDURE — 93005 ELECTROCARDIOGRAM TRACING: CPT | Mod: HCNC

## 2025-02-08 PROCEDURE — 97530 THERAPEUTIC ACTIVITIES: CPT | Mod: HCNC

## 2025-02-08 PROCEDURE — 99233 SBSQ HOSP IP/OBS HIGH 50: CPT | Mod: HCNC,,, | Performed by: INTERNAL MEDICINE

## 2025-02-08 PROCEDURE — 80048 BASIC METABOLIC PNL TOTAL CA: CPT | Mod: HCNC | Performed by: INTERNAL MEDICINE

## 2025-02-08 PROCEDURE — 97166 OT EVAL MOD COMPLEX 45 MIN: CPT | Mod: HCNC

## 2025-02-08 PROCEDURE — 63600175 PHARM REV CODE 636 W HCPCS: Mod: HCNC | Performed by: INTERNAL MEDICINE

## 2025-02-08 PROCEDURE — 85025 COMPLETE CBC W/AUTO DIFF WBC: CPT | Mod: HCNC | Performed by: INTERNAL MEDICINE

## 2025-02-08 PROCEDURE — 36415 COLL VENOUS BLD VENIPUNCTURE: CPT | Mod: HCNC | Performed by: INTERNAL MEDICINE

## 2025-02-08 RX ADMIN — FUROSEMIDE 40 MG: 10 INJECTION, SOLUTION INTRAMUSCULAR; INTRAVENOUS at 09:02

## 2025-02-08 RX ADMIN — POTASSIUM CHLORIDE 20 MEQ: 1500 TABLET, EXTENDED RELEASE ORAL at 09:02

## 2025-02-08 RX ADMIN — MUPIROCIN: 20 OINTMENT TOPICAL at 09:02

## 2025-02-08 RX ADMIN — Medication 400 MG: at 09:02

## 2025-02-08 NOTE — PLAN OF CARE
P.T. EVAL COMPLETE.  PT CURRENTLY REQUIRES SBA FOR BED MOBILITY, CGA FOR TF'S USING SPC, SBA FOR GAIT AND TF'S USING RW.  P.T. RECOMMENDS LOW INTENSITY THERAPY UPON DISCHARGE WITH CONTINUED 24 HOUR CARE, DME: RO[facility]

## 2025-02-08 NOTE — PT/OT/SLP EVAL
Occupational Therapy   Evaluation    Name: Long Albarran  MRN: [id number]  Admitting Diagnosis: Bradycardia  Recent Surgery: * No surgery found *      Recommendations:     Discharge Recommendations: Low Intensity Therapy  Discharge Equipment Recommendations:  walker, rolling  Barriers to discharge:  None    Assessment:     Long Albarran is a 89 y.o. male with a medical diagnosis of Bradycardia.  He presents with the following performance deficits affecting function: weakness, impaired endurance, impaired self care skills, impaired functional mobility, gait instability, impaired balance, decreased safety awareness, impaired cardiopulmonary response to activity.      Rehab Prognosis: Good; patient would benefit from acute skilled OT services to address these deficits and reach maximum level of function.       Plan:     Patient to be seen 2 x/week to address the above listed problems via self-care/home management, therapeutic activities, therapeutic exercises  Plan of Care Expires: 02/22/25  Plan of Care Reviewed with: patient, daughter    Subjective     Chief Complaint: none stated  Patient/Family Comments/goals: get better, return home    Occupational Profile:  Living Environment: lives with daughter and sons in a 1 story house with no steps to enter. Tub/shower combo.  Previous level of function: Pt (I) with ADLs and Mod (I) with functional mobility using SPC.  Roles and Routines: does not drive or work  Equipment Used at Home: cane, straight  Assistance upon Discharge: pt has 24/7 care from family at home.    Pain/Comfort:  Pain Rating 1: 0/10    Objective:     Pt's primary language is Lao; pt's daughter at bedside providing translation.     Communicated with: Nurse and epic chart review prior to session.  Patient found supine with peripheral IV, telemetry upon OT entry to room.    General Precautions: Standard, fall  Orthopedic Precautions: N/A  Braces: N/A  Respiratory Status: Room air    Occupational  Performance:    Bed Mobility:    Patient completed Rolling/Turning to Left with  stand by assistance  Patient completed Scooting/Bridging with stand by assistance  Patient completed Supine to Sit with stand by assistance    Functional Mobility/Transfers:  Patient completed Sit <> Stand Transfer with contact guard assistance  with  straight cane   Patient completed Bed <> Chair Transfer using Stand Pivot technique with stand by assistance with rolling walker  Functional Mobility: Patient completed x10ft functional mobility with Min A and SPC to increase dynamic standing balance and activity tolerance needed for ADL completion.   Pt unsteady; RW given to pt for stability.  Pt then ambulated x210ft with SBA and RW.    Activities of Daily Living:  Toileting: supervision using bathroom commode    Cognitive/Visual Perceptual:  Cognitive/Psychosocial Skills:     -       Oriented to: Person, Place, Time, and Situation   -       Follows Commands/attention:Follows multistep  commands  -       Communication: clear/fluent and speaks Thai  -       Memory: No Deficits noted  -       Safety awareness/insight to disability: intact     Physical Exam:  Sensation:    -       Intact  Upper Extremity Range of Motion:     -       Right Upper Extremity: WFL  -       Left Upper Extremity: WFL  Upper Extremity Strength:    -       Right Upper Extremity: 4/5 grossly  -       Left Upper Extremity: 4/5 grossly   Strength:    -       Right Upper Extremity: WFL  -       Left Upper Extremity: WFL    AMPAC 6 Click ADL:  AMPAC Total Score: 22    Treatment & Education:  Patient educated on role of OT in acute setting and benefits of participation. Educated on techniques to use to increase independence and decrease fall risk with functional transfers. Educated on importance of OOB activity and calling for A to transfer back to bed. Encouraged completion of B UE AROM therex throughout the day to tolerance to increase functional strength and  activity tolerance. Educated patient on importance of increased tolerance to upright position and direct impact on CV endurance and strength. Patient encouraged to sit up in chair for a minimum of 2 consecutive hours per day.  Patient stated understanding and in agreement with POC.     Patient left up in chair with all lines intact, call button in reach, chair alarm on, and daughter present    GOALS:   Multidisciplinary Problems       Occupational Therapy Goals          Problem: Occupational Therapy    Goal Priority Disciplines Outcome Interventions   Occupational Therapy Goal     OT, PT/OT     Description: Goals to be met by: 2/22/25     Patient will increase functional independence with ADLs by performing:    LE Dressing with [place].  Grooming while standing at sink with [place].  Toileting from toilet with [place] for hygiene and clothing management.   Toilet transfer to toilet with Modified [place] with RW.  Upper extremity exercise program x15 reps per handout, with independence.                         DME Justifications:   Long's mobility limitation cannot be sufficiently resolved by the use of a cane. His functional mobility deficit can be sufficiently resolved with the use of a Rolling Walker. Patient's mobility limitation significantly impairs their ability to participate in one of more activities of daily living.  The use of a RW will significantly improve the patient's ability to participate in MRADLS and the patient will use it on regular basis in the home.    History:     Past Medical History:   Diagnosis Date    A-fib     Diastolic dysfunction 12/2017    Hypertriglyceridemia          Past Surgical History:   Procedure Laterality Date    ABDOMINAL SURGERY      appendix removal in [place] 3 years ago    CATARACT EXTRACTION, BILATERAL         Time Tracking:     OT Date of Treatment: 02/08/25  OT Start Time: 0845  OT Stop Time: 0905  OT Total Time (min): 20 min    Billable  Minutes:Evaluation 10  Therapeutic Activity 10    2/8/2025  Dasha Lucero, OT

## 2025-02-08 NOTE — NURSING TRANSFER
Nursing Transfer Note      2/7/2025   11:30 PM    Nurse giving handoff: Angy  Nurse receiving handoff: Tiffany    Reason patient is being transferred: Observation    Transfer From: ICU    Transfer via wheelchair    Transfer with cardiac monitoring    Transported by ICU    Transfer Vital Signs:  Blood Pressure:148/64  Heart Rate:73  O2:93  Temperature:97.5  Respirations:22    Telemetry: Box Number [id number], Rate 73, and Rhythm afib  Order for Tele Monitor? Yes    Patient belongings transferred with patient: Yes    Chart send with patient: Yes    Notified: Family at bedside    Upon arrival to floor: cardiac monitor applied, patient oriented to room, call bell in reach, and bed in lowest position

## 2025-02-08 NOTE — NURSING
Transferred to [facility] via wheelchair.   Continued on Heparin drip, awaiting PTT results.   Tolerated ambulation in the room with a cane.  CUAUHTEMOC Ayala RN  present during bedside handoff.

## 2025-02-08 NOTE — PLAN OF CARE
Pt oriented x4. Vital signs stable  Pt remained afebrile throughout this shift.   All meds administered per order.   Pt remained free of falls this shift.   Plan of care reviewed. Patient verbalizes understanding.   Pt moving/turning independently.   Bed in lowest position, upper side side rails up x 2, wheels locked  Call light in reach, bed alarm on. Family at bedside.   Patient instructed to call staff for mobility/assistance.  Nonskid socks on when out of bed.  Patient education provided.  No further concerns voiced at this time.    Problem: Adult Inpatient Plan of Care  Goal: Plan of Care Review  Outcome: Progressing  Goal: Patient-Specific Goal (Individualized)  Outcome: Progressing  Goal: Absence of Hospital-Acquired Illness or Injury  Outcome: Progressing  Goal: Optimal Comfort and Wellbeing  Outcome: Progressing  Goal: Readiness for Transition of Care  Outcome: Progressing     Problem: Fall Injury Risk  Goal: Absence of Fall and Fall-Related Injury  Outcome: Progressing     Problem: Skin Injury Risk Increased  Goal: Skin Health and Integrity  Outcome: Progressing

## 2025-02-08 NOTE — NURSING
Patient is ready for discharge. Patient stable alert and oriented. IVs removed. No complaints of pain. Discussed discharge plan. Reviewed medications and side effects, appointments, and answered questions with patient and family. RX sent to patient preferred pharmacy.

## 2025-02-08 NOTE — PT/OT/SLP EVAL
Physical Therapy Evaluation and Treatment    Patient Name:  Long Albarran   MRN:  [id number]    Recommendations:     Discharge Recommendations: Low Intensity Therapy   Discharge Equipment Recommendations: walker, rolling   Barriers to discharge: None    Assessment:     Long Albarran is a 89 y.o. male admitted with a medical diagnosis of Bradycardia.  He presents with the following impairments/functional limitations: weakness, impaired endurance, impaired functional mobility, gait instability, decreased coordination.    Rehab Prognosis: Fair; patient would benefit from acute skilled PT services to address these deficits and reach maximum level of function.    Recent Surgery: * No surgery found *     Plan:     During this hospitalization, patient to be seen 3 x/week to address the identified rehab impairments via therapeutic activities, therapeutic exercises, gait training and progress toward the following goals:    Plan of Care Expires:  02/22/25    Subjective     Chief Complaint: NONE, EAGER TO PARTICIPATE  **DAUGHTER PRESENT FOR TRANSLATION, PT IS NON-ENGLISH SPEAKING  Patient/Family Comments/goals: NONE STATED  Pain/Comfort:  Pain Rating 1: 0/10    Patients cultural, spiritual, Latter-day conflicts given the current situation:      Living Environment:  PT LIVES WITH DAUGHTER WHO PROVIDES 24 HOUR CARE, 1 STORY HOUSE NO STEPS, AMB WITH SPC IN HOME, DOES NOT DRIVE, INDEP WITH ADL'S  Prior to admission, patients level of function was DIEGO WITH SPC.  Equipment used at home: cane, straight.  DME owned (not currently used): none.  Upon discharge, patient will have assistance from DAUGHTER.    Objective:     Communicated with NURSE SANTACRUZ prior to session.  Patient found supine with telemetry, peripheral IV  upon PT entry to room.    General Precautions: Standard, fall  Orthopedic Precautions:N/A   Braces: N/A  Respiratory Status: Room air    Exams:  Cognitive Exam:  Patient is oriented to Person, Place, Time, and  "Situation.  PT ABLE TO FOLLOW ALL COMMANDS THROUGH TRANSLATION  Postural Exam:  Patient presented with the following abnormalities:    -       Rounded shoulders  Sensation:    -       Intact  RLE ROM: WFL  RLE Strength: WFL  LLE ROM: WFL  LLE Strength: WFL    Functional Mobility:  Bed Mobility:     Rolling Left:  stand by assistance  Scooting: stand by assistance  Supine to Sit: stand by assistance  Transfers:     Sit to Stand:  contact guard assistance with straight cane  Bed to Chair: contact guard assistance with  straight cane  using  Step Transfer  Gait: PT AMB 10' WITH SPC AND FAWN, UNSTEADY AND REACHING FOR WALL, ENCOURAGED RW USE, PT ' WITH RW AND SBA, NO LOB OR SOB ON ROOM AIR, GAIT QUALITY AND SAFETY MUCH IMPROVED WITH RW USE  Balance: FAIR SITTING BALANCE, FAIR DYNAMIC BALANCE DURING GAIT    AM-PAC 6 CLICK MOBILITY  Total Score:18     Treatment & Education:  PT AND DAUGHTER EDUCATION:  - ROLE OF P.T. AND POC IN ACUTE CARE HOSPITAL SETTING  - RW USE AND SAFETY DURING TF'S AND GAIT  -ENCOURAGED TO INCREASE TIME OOB IN CHAIR TO TOLERANCE   - EDUCATED IN AND ENCOURAGED TO CONTINUE THERAPUETIC EXERCISES THROUGHOUT THE DAY TO INCREASE ACTIVITY TOLERANCE AND DECREASE RISK FOR PNEUMONIA AND BLOOD CLOTS: HIP FLEX/EXT, HIP ABD/ADD, QUAD SET, HEEL SLIDE, AP  - RISK FOR FALLS DUE TO GENERALIZED WEAKNESS, EDUCATED ON "CALL DON'T FALL", ENCOURAGED TO CALL FOR ASSISTANCE WITH ALL NEEDS SUCH AS BED<>CHAIR TRANSFERS OR TRIPS TO BATHROOM, PT AGREEABLE TO SAFETY PRECAUTIONS    Patient left up in chair with all lines intact, call button in reach, chair alarm on, NURSE notified, and DAUGHTER present.    GOALS:   Multidisciplinary Problems       Physical Therapy Goals          Problem: Physical Therapy    Goal Priority Disciplines Outcome Interventions   Physical Therapy Goal     PT, PT/OT     Description: LTG'S TO BE MET IN 14 DAYS (2-22-25)  PT WILL BE DIEGO FOR BED MOBILITY  PT WILL BE DIEGO FOR BED<>CHAIR TF'S  PT " WILL  FEET WITH RW AND DIEGO  PT WILL INC AMPAC SCORE BY 2 POINTS TO PROGRESS GROSS FUNC MOBILITY                         DME Justifications:   Long's mobility limitation cannot be sufficiently resolved by the use of a cane. His functional mobility deficit can be sufficiently resolved with the use of a Rolling Walker. Patient's mobility limitation significantly impairs their ability to participate in one of more activities of daily living.  The use of a RW will significantly improve the patient's ability to participate in MRADLS and the patient will use it on regular basis in the home.    History:     Past Medical History:   Diagnosis Date    A-fib     Diastolic dysfunction 12/2017    Hypertriglyceridemia        Past Surgical History:   Procedure Laterality Date    ABDOMINAL SURGERY      appendix removal in [place] 3 years ago    CATARACT EXTRACTION, BILATERAL         Time Tracking:     PT Received On: 02/08/25  PT Start Time: 0845     PT Stop Time: 0910  PT Total Time (min): 25 min     Billable Minutes: Evaluation 15 and Therapeutic Activity 10    02/08/2025

## 2025-02-08 NOTE — DISCHARGE INSTRUCTIONS
Our goal at [facility] is to always give you outstanding care and exceptional service. You may receive a survey by mail, text or e-mail in the next 7-10 days from Kamron Yang and our leadership team asking about the care you received with us. The survey should only take 5-10 minutes to complete and is very important to us.     Your feedback provides us with a way to recognize our staff who work tirelessly to provide the best care! Also, your responses help us learn how to improve when your experience was below our aspiration of excellence. We WILL use your feedback to continue making improvements to help us provide the highest quality care. We keep your personal information and feedback confidential. We appreciate your time completing this survey and can't wait to hear from you!!!     We want you to leave us today feeling like you can DEFINITELY recommend us to others! We look forward to your continued care with us! Thanks so much for choosing [facility] for your healthcare needs!

## 2025-02-08 NOTE — PROGRESS NOTES
[facility] - Intensive Care (Hospital)  Cardiology  Consult Note     Patient Name: Long Albarran  MRN: [id number]  Admission Date: 2/6/2025  Hospital Length of Stay: 1 days  Code Status: Full Code   Attending Provider: Cady Turner MD   Consulting Provider: Catherine Barron PA-C  Primary Care Physician: Penny Watson DO  Principal Problem:Bradycardia     Patient information was obtained from patient, relative(s), past medical records, and ER records.      Inpatient consult to Cardiology  Consult performed by: Catherine Barron PA-C  Consult ordered by: Cady Turner MD           Subjective:      Chief Complaint:  Back pain/CP     HPI:   HPI obtained from daughter who served as      Mr. Hines is a 89 year old male patient whose current medical conditions include hyperlipidemia, PAF/PAFL, DJD, and chronic diastolic CHF who presented to [facility] ED yesterday due to chest pain that onset shortly PTA and radiated to his lower back. Associated symptoms included brief bilateral hand numbness, SOB, and palpitations. He denied any associated fever, chills, near syncope, or syncope. Initial workup in ED revealed troponin of 0.059>0.061 as well as bradycardia (HR dipped to 30's), and patient was subsequently given atropine and admitted to ICU for further evaluation and treatment. Cardiology consulted to assist with management. Patient seen and examined today with daughter present. Feels ok. Still has some back pain. Denies SOB. No other CV complaints. No history of near syncope or syncope. HR in 50's-60's during exam. Followed in clinic by Dr. Guzman. Daughter reports PPM has been discussed in the past. Prior monitor in 2/24 with no high grade block or pauses reported.       Hospital Course:  2/8/25- Patient seen and examined today lying in bed. He was transferred to the floor, and remains on heparin drip. Vitals are stable with improved HR in the 60's/70's. Labs are pending. Patient is diuresing  well. He remains on IV lasix for diuresis. PT/OT evaluation pending.    Stable FOR DC and f/u in CV clinic with Holter             Past Medical History:   Diagnosis Date    A-fib      Diastolic dysfunction 12/2017    Hypertriglyceridemia                 Past Surgical History:   Procedure Laterality Date    ABDOMINAL SURGERY         appendix removal in [place] 3 years ago    CATARACT EXTRACTION, BILATERAL             Review of patient's allergies indicates:  No Known Allergies     No current facility-administered medications on file prior to encounter.           Current Outpatient Medications on File Prior to Encounter   Medication Sig    albuterol (ACCUNEB) 0.63 mg/3 mL Nebu Take 3 mLs (0.63 mg total) by nebulization every 6 (six) hours as needed (wheezing or shortness of breath). (Patient not taking: Reported on 2/6/2025)    albuterol-ipratropium (DUO-NEB) 2.5 mg-0.5 mg/3 mL nebulizer solution Take by nebulization as needed for Wheezing. (Patient not taking: Reported on 2/6/2025)    BREO ELLIPTA 200-25 mcg/dose DsDv diskus inhaler Inhale 1 puff into the lungs as needed. (Patient not taking: Reported on 2/6/2025)    cyproheptadine (PERIACTIN) 4 mg tablet Take 1 tablet (4 mg total) by mouth 2 (two) times daily as needed (decreased appetite). (Patient not taking: Reported on 2/6/2025)    FLUAD QUAD 2023-24,65Y UP,,PF, 60 mcg (15 mcg x 4)/0.5 mL Syrg  (Patient not taking: Reported on 2/6/2025)    furosemide (LASIX) 20 MG tablet Take 2 tablets (40 mg total) by mouth once daily. (Patient not taking: Reported on 2/6/2025)    meloxicam (MOBIC) 15 MG tablet Take 1 tablet (15 mg total) by mouth daily as needed for Pain. (Patient not taking: Reported on 2/6/2025)    vibegron (GEMTESA) 75 mg Tab Take 1 tablet (75 mg total) by mouth once daily.      Family History    None               Tobacco Use    Smoking status: Former       Current packs/day: 0.00       Average packs/day: 1 pack/day for 61.0 years (61.0 ttl pk-yrs)        Types: Cigarettes       Start date: 1949       Quit date: 2010       Years since quitting: 15.1       Passive exposure: Past    Smokeless tobacco: Never   Substance and Sexual Activity    Alcohol use: No       Alcohol/week: 0.0 standard drinks of alcohol    Drug use: No    Sexual activity: Not Currently      Review of Systems   Constitutional: Positive for malaise/fatigue.   HENT: Negative.     Eyes: Negative.    Cardiovascular:  Positive for chest pain (atypical).   Respiratory: Negative.     Hematologic/Lymphatic: Bruises/bleeds easily.   Skin: Negative.    Musculoskeletal:  Positive for arthritis and back pain.   Gastrointestinal: Negative.    Genitourinary: Negative.    Neurological: Negative.    Psychiatric/Behavioral: Negative.     Allergic/Immunologic: Negative.       Objective:      Vital Signs (Most Recent):  Temp: 97.9 °F (36.6 °C) (02/07/25 1100)  Pulse: 77 (02/07/25 1100)  Resp: (!) 24 (02/07/25 1100)  BP: (!) 157/70 (02/07/25 1000)  SpO2: (!) 94 % (02/07/25 1100) Vital Signs (24h Range):  Temp:  [97.3 °F (36.3 °C)-97.9 °F (36.6 °C)] 97.9 °F (36.6 °C)  Pulse:  [] 77  Resp:  [14-47] 24  SpO2:  [92 %-97 %] 94 %  BP: (119-209)/() 157/70      Weight: 55.9 kg (123 lb 3.8 oz)  Body mass index is 26.67 kg/m².     SpO2: (!) 94 %           Intake/Output Summary (Last 24 hours) at 2/7/2025 1219  Last data filed at 2/7/2025 1100      Gross per 24 hour   Intake 150 ml   Output 650 ml   Net -500 ml         Lines/Drains/Airways         Peripheral Intravenous Line  Duration                     Peripheral IV - Single Lumen 02/06/25 1616 20 G Right Antecubital <1 day                          Physical Exam  Vitals and nursing note reviewed.   Constitutional:       Appearance: Normal appearance.      Comments: Elderly   HENT:      Head: Normocephalic and atraumatic.   Eyes:      Pupils: Pupils are equal, round, and reactive to light.   Cardiovascular:      Rate and Rhythm: Bradycardia present. Rhythm regularly  "irregular.      Heart sounds: S1 normal and S2 normal. No murmur heard.     Comments: HR 50's-60's  Pulmonary:      Effort: Pulmonary effort is normal.   Musculoskeletal:      Right lower leg: No edema.      Left lower leg: No edema.   Skin:     General: Skin is warm and dry.   Neurological:      Mental Status: He is oriented to person, place, and time.   Psychiatric:         Mood and Affect: Mood normal.         Behavior: Behavior normal.            Significant Labs: CMP        Recent Labs   Lab 02/06/25  1338 02/07/25  0241    139   K 4.3 3.9    105   CO2 23 23    109   BUN 19 20   CREATININE 1.0 0.9   CALCIUM 9.1 9.6   PROT 7.5 7.8   ALBUMIN 3.8 4.0   BILITOT 0.5 0.9   ALKPHOS 103 87   AST 24 24   ALT 12 13   ANIONGAP 8 11   , CBC         Recent Labs   Lab 02/06/25  1343 02/07/25  0241 02/07/25  0849   WBC 5.04 6.60 6.23   HGB 15.2 16.3 16.4   HCT 44.0 46.6 47.8    159 162   , Troponin No results for input(s): "TROPONINIHS" in the last 48 hours., and All pertinent lab results from the last 24 hours have been reviewed.     Significant Imaging: Echocardiogram: Transthoracic echo (TTE) complete (Cupid Only):         Results for orders placed or performed during the hospital encounter of 02/06/25   Echo   Result Value Ref Range     BSA 1.5 m2     LVOT stroke volume 58.7 cm3     LVIDd 4.6 3.5 - 6.0 cm     LV Systolic Volume 42.95 mL     LV Systolic Volume Index 29.4 mL/m2     LVIDs 3.3 2.1 - 4.0 cm     LV ESV A2C 71.85 mL     LV Diastolic Volume 99.12 mL     LV Diastolic Volume Index 67.89 mL/m2     Left Ventricular End Systolic Volume by Teichholz Method 42.95 mL     Left Ventricular End Diastolic Volume by Teichholz Method 99.12 mL     IVS 0.9 0.6 - 1.1 cm     LVOT diameter 2.0 cm     LVOT area 3.1 cm2     FS 28.3 28 - 44 %     Left Ventricle Relative Wall Thickness 0.30 cm     PW 0.7 0.6 - 1.1 cm     LV mass 117.9 g     LV Mass Index 80.8 g/m2     TR Max Nazario 3.0 m/s     IVRT 122 msec     " LVOT peak maxim 0.9 m/s     Left Ventricular Outflow Tract Mean Velocity 0.53 cm/s     Left Ventricular Outflow Tract Mean Gradient 1.41 mmHg     RV- matute basal diam 3.4 cm     RVOT peak VTI 21.0 cm     TAPSE 1.36 cm     RV/LV Ratio 0.74 cm     LA size 3.8 cm     Left Atrium Minor Axis 6.0 cm     Left Atrium Major Axis 6.2 cm     RA Major Axis 5.37 cm     AV mean gradient 3 mmHg     AV peak gradient 2 mmHg     Ao peak maxim 0.7 m/s     Ao VTI 24.7 cm     LVOT peak VTI 18.7 cm     AV valve area 2.4 cm²     AV Velocity Ratio 1.29       AV index (prosthetic) 0.76       NORA by Velocity Ratio 4.0 cm²     Triscuspid Valve Regurgitation Peak Gradient 36 mmHg     PV mean gradient 3 mmHg     PV PEAK VELOCITY 1.12 m/s     PV peak gradient 6 mmHg     RVOT peak maxim 1.23 m/s     Ao root annulus 2.73 cm     STJ 2.67 cm     Ascending aorta 3.22 cm     IVC diameter 1.65 cm     ZLVIDS 1.50       ZLVIDD 0.46       LA area A2C 23.82 cm2     RVDD 3.41 cm     CARLOS 55 mL/m2     LA Vol 81 cm3     LA WIDTH 4.1 cm     RA Width 3.6 cm   , EKG: reviewed, and X-Ray: CXR: X-Ray Chest 1 View (CXR):       Results for orders placed or performed during the hospital encounter of 02/06/25   X-Ray Chest 1 View     Narrative     EXAMINATION:  XR CHEST 1 VIEW     CLINICAL HISTORY:  Chest pain, unspecified     FINDINGS:  Single view of the chest.  Comparison 04/22/2024     Cardiac silhouette is enlarged but stable.  The lungs demonstrate no evidence of active disease.  No evidence of pleural effusion or pneumothorax.  Bones appear intact.  Moderate degenerative changes and moderate atherosclerotic disease.        Impression     No acute process seen.        Electronically signed by:Scott Soler MD  Date:                                        02/06/2025  Time:                                       13:02    and X-Ray Chest PA and Lateral (CXR): No results found for this visit on 02/06/25.  Assessment and Plan:   Patient who presents with CP/back pain and  bradycardia. Stable during exam, monitor HR. At risk for PPM. Continue heparin gtt for now, re-discuss risks/benefits of AC with patient/family prior to d/c.          * Bradycardia  -Chronic per chart, HR in 50's-60's during exam  -Continue to monitor  -Seems relatively asymptomatic-no near syncope or syncope  -Avoid AV andrew agents  -Check TTE  -At risk for PPM, has been discussed in the past      Chronic diastolic congestive heart failure  -Continue gentle IV diuresis  -TTE pending  -Strict I's/O's    2/8/25  -Blood pressure controlled   -He remains on IV lasix, pt with good diuresis     Atrial fibrillation  -Currently in afib/aflutter with controlled rate  -Avoid AV andrew agents given bradycardia  -Heparin gitt initiated; on ASA only as OP; risks/benefits of AC discussed with patient and family, ? Intermittent hematuria  -Will continue to monitor        2/8/25  Stable FOR DC and f/u in CV clinic with Holter       VTE Risk Mitigation (From admission, onward)              Ordered       heparin 25,000 units in dextrose 5% (100 units/ml) IV bolus from bag LOW INTENSITY nomogram - [facility]  As needed (PRN)        Question:  Heparin Infusion Adjustment (DO NOT MODIFY ANSWER)  Answer:  [email or web address]\[email or web address]\[email or web address]\heparin LOW INTENSITY nomogram for [facility] [id number]    02/07/25 0836       heparin 25,000 units in dextrose 5% (100 units/ml) IV bolus from bag LOW INTENSITY nomogram - [facility]  As needed (PRN)        Question:  Heparin Infusion Adjustment (DO NOT MODIFY ANSWER)  Answer:  [email or web address]\Images\[email or web address]\HeparinInfusions\heparin LOW INTENSITY nomogram for [facility] [id number]    02/07/25 0836       heparin 25,000 units in dextrose 5% 250 mL (100 units/mL) infusion LOW INTENSITY nomogram - [facility]  Continuous        Question:  Begin at (units/kg/hr)  Answer:  12    02/07/25 0836       IP VTE HIGH RISK PATIENT  Once         02/06/25 1830       Place sequential compression device  Until discontinued          02/06/25 1830                         Cardiology   [facility] - Intensive Care ([facility])

## 2025-02-08 NOTE — PLAN OF CARE
OT nori completed. Recommends low intensity therapy.  SBA for bed mobility and ambulation 210ft with RW. Min A for ambulation 10ft with SPC. CGA for STS with SPC.

## 2025-02-08 NOTE — NURSING
Patient and daughter at bedside, refuses bed alarm. Refused x2. Patient and family educated on importance of having a bed alarm for safety.

## 2025-02-10 ENCOUNTER — TELEPHONE (OUTPATIENT)
Dept: CARDIOLOGY | Facility: CLINIC | Age: OVER 89
End: 2025-02-10
Payer: MEDICARE

## 2025-02-10 ENCOUNTER — PATIENT OUTREACH (OUTPATIENT)
Dept: ADMINISTRATIVE | Facility: CLINIC | Age: OVER 89
End: 2025-02-10
Payer: MEDICARE

## 2025-02-10 ENCOUNTER — OFFICE VISIT (OUTPATIENT)
Dept: CARDIOLOGY | Facility: CLINIC | Age: OVER 89
End: 2025-02-10
Payer: MEDICARE

## 2025-02-10 VITALS
SYSTOLIC BLOOD PRESSURE: 120 MMHG | OXYGEN SATURATION: 94 % | RESPIRATION RATE: 16 BRPM | WEIGHT: 123.25 LBS | DIASTOLIC BLOOD PRESSURE: 80 MMHG | HEIGHT: 60 IN | BODY MASS INDEX: 24.2 KG/M2 | HEART RATE: 72 BPM

## 2025-02-10 DIAGNOSIS — I50.32 CHRONIC DIASTOLIC CONGESTIVE HEART FAILURE: ICD-10-CM

## 2025-02-10 DIAGNOSIS — I27.9 PULMONARY HEART DISEASE: ICD-10-CM

## 2025-02-10 DIAGNOSIS — R00.1 BRADYCARDIA: ICD-10-CM

## 2025-02-10 DIAGNOSIS — I70.0 ATHEROSCLEROSIS OF AORTA: ICD-10-CM

## 2025-02-10 DIAGNOSIS — R06.02 SHORTNESS OF BREATH: Primary | ICD-10-CM

## 2025-02-10 DIAGNOSIS — I48.0 PAROXYSMAL ATRIAL FIBRILLATION: ICD-10-CM

## 2025-02-10 PROCEDURE — 1160F RVW MEDS BY RX/DR IN RCRD: CPT | Mod: HCNC,CPTII,S$GLB, | Performed by: INTERNAL MEDICINE

## 2025-02-10 PROCEDURE — 99999 PR PBB SHADOW E&M-EST. PATIENT-LVL III: CPT | Mod: PBBFAC,HCNC,, | Performed by: INTERNAL MEDICINE

## 2025-02-10 PROCEDURE — 99214 OFFICE O/P EST MOD 30 MIN: CPT | Mod: HCNC,S$GLB,, | Performed by: INTERNAL MEDICINE

## 2025-02-10 PROCEDURE — 1111F DSCHRG MED/CURRENT MED MERGE: CPT | Mod: HCNC,CPTII,S$GLB, | Performed by: INTERNAL MEDICINE

## 2025-02-10 PROCEDURE — 3288F FALL RISK ASSESSMENT DOCD: CPT | Mod: HCNC,CPTII,S$GLB, | Performed by: INTERNAL MEDICINE

## 2025-02-10 PROCEDURE — 1159F MED LIST DOCD IN RCRD: CPT | Mod: HCNC,CPTII,S$GLB, | Performed by: INTERNAL MEDICINE

## 2025-02-10 PROCEDURE — 1101F PT FALLS ASSESS-DOCD LE1/YR: CPT | Mod: HCNC,CPTII,S$GLB, | Performed by: INTERNAL MEDICINE

## 2025-02-10 RX ORDER — ACETAMINOPHEN 500 MG
1 TABLET ORAL 2 TIMES DAILY
Qty: 1 EACH | Refills: 0 | Status: SHIPPED | OUTPATIENT
Start: 2025-02-10

## 2025-02-10 NOTE — PROGRESS NOTES
C3 nurse spoke with Long Albarran for a TCC post hospital discharge follow up call. The patient has a scheduled HOSFU appointment with Penny Watson DO on 2/13/25 @ 1040am.

## 2025-02-10 NOTE — HOSPITAL COURSE
The patient presented  for evaluation of CP which onset gradually within the past day. Pain radiates to the lower back. Denies any recent injuries or traumas. Pt denies any blood thinners. Symptoms are intermittent and moderate in severity. No mitigating or exacerbating factors reported. Associated sxs include SOB and palpitations.  Admitted to the ICU and monitored overnight. Cardiology recommended continuation of heparin gtt and tele monitoring. Patient stable for transfer to the floor.  He was monitored on the floor. His heart rate improved, no need for PPM. Transition from heparin gtt to Eliquis. Cardiology cleared for discharge. Outpatient cardiology follow-up. Patient seen and examined, stable for discharge.

## 2025-02-10 NOTE — PROGRESS NOTES
Subjective:   Patient ID:  Long Albarran is a 89 y.o. male who presents for follow-up of No chief complaint on file.  Last clinic note 9-24:  Patient denies CP, angina or anginal equivalent.No dizziness  Cardiac monitor:  The patient was monitored for a total of 13d 13h, underlying rhythm is Atrial fibrillation.  The minimum heart rate was 38 bpm; the maximum 141 bpm; the average 65 bpm.  99.88 % of Atrial fibrillation/Atrial flutter with longest episode of 3d 5h.    Recently discharged from hospital yesterday:  Hospital Course:   The patient presented  for evaluation of CP which onset gradually within the past day. Pain radiates to the lower back. Denies any recent injuries or traumas. Pt denies any blood thinners. Symptoms are intermittent and moderate in severity. No mitigating or exacerbating factors reported. Associated sxs include SOB and palpitations.  Admitted to the ICU and monitored overnight. Cardiology recommended continuation of heparin gtt and tele monitoring. Patient stable for transfer to the floor.  He was monitored on the floor. His heart rate improved, no need for PPM. Transition from heparin gtt to Eliquis. Cardiology cleared for discharge. Outpatient cardiology follow-up. Patient seen and examined, stable for discharge.       Patient denies CP, angina or anginal equivalent.No dizziness. Echo nml EF  Atrial Fibrillation  Presents for follow-up visit. Symptoms are negative for bradycardia, chest pain, dizziness, palpitations, shortness of breath, syncope, tachycardia and weakness. The symptoms have been stable. There are no medication compliance problems.   Congestive Heart Failure  Presents for follow-up visit. Pertinent negatives include no chest pain, chest pressure, claudication, edema, fatigue, muscle weakness, orthopnea, palpitations, paroxysmal nocturnal dyspnea, shortness of breath or unexpected weight change. The symptoms have been stable. Compliance with total regimen is %.  Compliance with diet is %. Compliance with exercise is %. Compliance with medications is %.       Review of Systems   Constitutional: Negative. Negative for fatigue, unexpected weight change and weight gain.   HENT: Negative.     Eyes: Negative.    Cardiovascular: Negative.  Negative for chest pain, claudication, leg swelling, palpitations and syncope.   Respiratory:  Negative for shortness of breath.    Endocrine: Negative.    Hematologic/Lymphatic: Negative.    Skin: Negative.    Musculoskeletal:  Negative for muscle weakness.   Gastrointestinal: Negative.    Genitourinary: Negative.    Neurological: Negative.  Negative for dizziness and weakness.   Psychiatric/Behavioral: Negative.     Allergic/Immunologic: Negative.    All other systems reviewed and are negative.    Family History   Problem Relation Name Age of Onset    Cancer Neg Hx      Diabetes Neg Hx      Heart disease Neg Hx      Hypertension Neg Hx      Kidney disease Neg Hx      Stroke Neg Hx       Past Medical History:   Diagnosis Date    A-fib     Diastolic dysfunction 12/2017    Hypertriglyceridemia      Social History     Socioeconomic History    Marital status: Single   Tobacco Use    Smoking status: Former     Current packs/day: 0.00     Average packs/day: 1 pack/day for 61.0 years (61.0 ttl pk-yrs)     Types: Cigarettes     Start date: 1949     Quit date: 2010     Years since quitting: 15.1     Passive exposure: Past    Smokeless tobacco: Never   Substance and Sexual Activity    Alcohol use: No     Alcohol/week: 0.0 standard drinks of alcohol    Drug use: No    Sexual activity: Not Currently     Social Drivers of Health     Financial Resource Strain: Medium Risk (9/16/2024)    Overall Financial Resource Strain (CARDIA)     Difficulty of Paying Living Expenses: Somewhat hard   Food Insecurity: Food Insecurity Present (9/16/2024)    Hunger Vital Sign     Worried About Running Out of Food in the Last Year: Sometimes true     Ran Out  of Food in the Last Year: Sometimes true   Transportation Needs: No Transportation Needs (5/29/2024)    Received from [facility] and [facility]iliates    PRAPARE - Transportation     Lack of Transportation (Medical): No     Lack of Transportation (Non-Medical): No   Physical Activity: Insufficiently Active (9/16/2024)    Exercise Vital Sign     Days of Exercise per Week: 4 days     Minutes of Exercise per Session: 30 min   Stress: No Stress Concern Present (9/16/2024)    [employer] - Occupational Stress Questionnaire     Feeling of Stress : Not at all   Housing Stability: Low Risk  (2/6/2024)    Housing Stability Vital Sign     Unable to Pay for Housing in the Last Year: No     Number of Places Lived in the Last Year: 1     Unstable Housing in the Last Year: No     Current Outpatient Medications on File Prior to Visit   Medication Sig Dispense Refill    albuterol (ACCUNEB) 0.63 mg/3 mL Nebu Take 3 mLs (0.63 mg total) by nebulization every 6 (six) hours as needed (wheezing or shortness of breath). 75 mL 5    albuterol-ipratropium (DUO-NEB) 2.5 mg-0.5 mg/3 mL nebulizer solution Take by nebulization as needed for Wheezing.      apixaban (ELIQUIS) 2.5 mg Tab Take 1 tablet (2.5 mg total) by mouth 2 (two) times daily. 60 tablet 0    BREO ELLIPTA 200-25 mcg/dose DsDv diskus inhaler Inhale 1 puff into the lungs as needed.      furosemide (LASIX) 20 MG tablet Take 2 tablets (40 mg total) by mouth once daily. 60 tablet 5    cyproheptadine (PERIACTIN) 4 mg tablet Take 1 tablet (4 mg total) by mouth 2 (two) times daily as needed (decreased appetite). (Patient not taking: Reported on 2/6/2025) 60 tablet 2    meloxicam (MOBIC) 15 MG tablet Take 1 tablet (15 mg total) by mouth daily as needed for Pain. (Patient not taking: Reported on 2/6/2025) 30 tablet 5     Current Facility-Administered Medications on File Prior to Visit   Medication Dose Route  Frequency Provider Last Rate Last Admin    aspirin chewable tablet 81 mg  81 mg Oral Daily Herbert Guzman MD         Review of patient's allergies indicates:  No Known Allergies    Objective:     Physical Exam  Vitals and nursing note reviewed.   Constitutional:       Appearance: He is well-developed.   HENT:      Head: Normocephalic and atraumatic.   Eyes:      Conjunctiva/sclera: Conjunctivae normal.      Pupils: Pupils are equal, round, and reactive to light.   Cardiovascular:      Rate and Rhythm: Normal rate. Rhythm irregular.      Pulses: Intact distal pulses.      Heart sounds: Normal heart sounds.   Pulmonary:      Effort: Pulmonary effort is normal.      Breath sounds: Normal breath sounds.   Abdominal:      General: Bowel sounds are normal.      Palpations: Abdomen is soft.   Musculoskeletal:      Cervical back: Normal range of motion and neck supple.   Skin:     General: Skin is warm and dry.   Neurological:      Mental Status: He is alert and oriented to person, place, and time.         Assessment:     1. Shortness of breath    2. Pulmonary heart disease    3. Chronic diastolic congestive heart failure    4. Bradycardia    5. Paroxysmal atrial fibrillation    6. Atherosclerosis of aorta        Plan:     Shortness of breath    Pulmonary heart disease    Chronic diastolic congestive heart failure    Bradycardia    Paroxysmal atrial fibrillation    Atherosclerosis of aorta      Continue lasix- CHF  Continue eliquis - afib  Cardiac monitor

## 2025-02-10 NOTE — TELEPHONE ENCOUNTER
----- Message from Olayinka Saldaña MD sent at 2/8/2025  2:44 PM CST -----  Regarding: Hosp Follow up  Hi please schedule follow up with Dr. Guzman this week- for bradycardia, Afib;     rates improved did not need PPM now but rec outpt Holter/Vital. On Eliquis for a/c. Thanks   - PM

## 2025-02-10 NOTE — TELEPHONE ENCOUNTER
Followed up with patient's daughter, scheduled appointment for patient. She verbalized understanding of date, time, and location of appointment.

## 2025-02-10 NOTE — DISCHARGE SUMMARY
[facility] - Telemetry ([facility] Medicine  Discharge Summary      Patient Name: Long Albarran  MRN: [id number]  LAKSHMI: [id number]  Patient Class: IP- Inpatient  Admission Date: 2/6/2025  Hospital Length of Stay: 2 days  Discharge Date and Time: 2/8/2025  2:30 PM  Attending Physician: No att. providers found   Discharging Provider: Aryan Roger MD  Primary Care Provider: Penny Watson DO    Primary Care Team: Networked reference to record PCT     HPI:   89 y.o. male, comorbid conditions include Bradycardia, CHF, HLD and AFIB. Presented to the ED for evaluation of CP which onset gradually within the past day. Pain radiates to the lower back. Denies any recent injuries or traumas. Pt denies any blood thinners. Symptoms are intermittent and moderate in severity. No mitigating or exacerbating factors reported. Associated sxs include SOB and palpitations.  Admitted to the ICU and monitored overnight. Cardiology recommended continuation of heparin gtt and tele monitoring. Patient stable for transfer to the floor.    * No surgery found *      Hospital Course:   The patient presented  for evaluation of CP which onset gradually within the past day. Pain radiates to the lower back. Denies any recent injuries or traumas. Pt denies any blood thinners. Symptoms are intermittent and moderate in severity. No mitigating or exacerbating factors reported. Associated sxs include SOB and palpitations.  Admitted to the ICU and monitored overnight. Cardiology recommended continuation of heparin gtt and tele monitoring. Patient stable for transfer to the floor.  He was monitored on the floor. His heart rate improved, no need for PPM. Transition from heparin gtt to Eliquis. Cardiology cleared for discharge. Outpatient cardiology follow-up. Patient seen and examined, stable for discharge.     Goals of Care Treatment Preferences:  Code Status: Full Code         Consults:   Consults (From admission, onward)          Status Ordering  Provider     Inpatient consult to Cardiology  Once        Provider:  (Not yet assigned)    Completed DEDE NG     Inpatient consult to Hospitalist  Once        Provider:  (Not yet assigned)    Completed JUNIOR JENKINS              Final Active Diagnoses:    Diagnosis Date Noted POA    PRINCIPAL PROBLEM:  Bradycardia [R00.1] 01/26/2022 Yes    Chronic diastolic congestive heart failure [I50.32] 05/08/2023 Yes    Atrial fibrillation [I48.91] 01/26/2022 Yes      Problems Resolved During this Admission:       Discharged Condition: stable    Disposition: Home or Self Care    Follow Up:    Patient Instructions:      Diet Cardiac     Activity as tolerated       Significant Diagnostic Studies: Radiology:   Imaging Results              CTA Chest Non-Coronary (PE Studies) (Final result)  Result time 02/06/25 16:55:57      Final result by Parker Sung III, MD (02/06/25 16:55:57)                   Impression:         Negative for pulmonary pulmonary embolism.    Mild scarring or atelectasis bilaterally.    Multiple compression deformities lower thoracic spine, age indeterminate.          Finalized on: 2/6/2025 4:55 PM By:  Parker Sung MD  [facility]# [id number]      2025-02-06 16:57:59.020     [facility]               Narrative:    EXAM: CTA CHEST NON CORONARY (PE STUDIES)    CLINICAL HISTORY:  Pulmonary embolism (PE) suspected, unknown D-dimer;    CTA TECHNIQUE: Standard thin-section CTA axial imaging performed following administration of IV contrast.  Reconstructed 3-D MIP images also obtained, reviewed, and archived.    All CT scans at this location are performed using dose modulation techniques as appropriate to a performed exam including the following: automated exposure control; adjustment of the mA and/or kV according to patient size (this includes techniques or standardized protocols for targeted exams where dose is matched to indication / reason for exam; i.e. extremities or head); use of  iterative reconstruction technique.    COMPARISON:  None.    FINDINGS:     Pulmonary arteries are patent.  Negative for pulmonary was.  Main pulmonary trunk normal in size.  Mild cardiomegaly. No coronary artery calcifications. Aorta not well opacified but is normal caliber with mild atherosclerotic calcification.  Negative for adenopathy throughout the chest.    Scarring right lung apex.  Otherwise the right lung is clear.  Atelectasis or scarring within the lingula and left lower lobe.  Otherwise left lung clear.    Soft tissues of the inferior neck are normal.  Trachea and esophagus are midline and normal in appearance.    Visualized upper abdominal contents are normal.    Chest wall soft tissues are normal. Multiple compression deformities of the T8, T9, T10 and T11 vertebral bodies, age indeterminate.  No retropulsion.                                         X-Ray Lumbar Spine Ap And Lateral (Final result)  Result time 02/06/25 13:07:17      Final result by Ernst Caballero MD (02/06/25 13:07:17)                   Impression:      As above.      Electronically signed by: Ernst Caballero  Date:    02/06/2025  Time:    13:07               Narrative:    EXAMINATION:  XR LUMBAR SPINE AP AND LATERAL    CLINICAL HISTORY:  Low back pain;    TECHNIQUE:  AP, lateral and spot images were performed of the lumbar spine.    COMPARISON:  None    FINDINGS:  No fracture.  No traumatic malalignment.  No osseous destructive process.  Remote height loss suspected at L1 and L2 of the vertebral bodies.  Otherwise vertebral body heights are well maintained.  Mild-to-moderate degenerative change.                                       X-Ray Chest 1 View (Final result)  Result time 02/06/25 13:02:01      Final result by Scott Soler MD (02/06/25 13:02:01)                   Impression:      No acute process seen.      Electronically signed by: Scott Soler MD  Date:    02/06/2025  Time:    13:02               Narrative:     EXAMINATION:  XR CHEST 1 VIEW    CLINICAL HISTORY:  Chest pain, unspecified    FINDINGS:  Single view of the chest.  Comparison 04/22/2024    Cardiac silhouette is enlarged but stable.  The lungs demonstrate no evidence of active disease.  No evidence of pleural effusion or pneumothorax.  Bones appear intact.  Moderate degenerative changes and moderate atherosclerotic disease.                                        Pending Diagnostic Studies:       None           Medications:  Reconciled Home Medications:      Medication List        START taking these medications      apixaban 2.5 mg Tab  Commonly known as: ELIQUIS  Take 1 tablet (2.5 mg total) by mouth 2 (two) times daily.            ASK your doctor about these medications      albuterol 0.63 mg/3 mL Nebu  Commonly known as: ACCUNEB  Take 3 mLs (0.63 mg total) by nebulization every 6 (six) hours as needed (wheezing or shortness of breath).     albuterol-ipratropium 2.5 mg-0.5 mg/3 mL nebulizer solution  Commonly known as: DUO-NEB  Take by nebulization as needed for Wheezing.     BREO ELLIPTA 200-25 mcg/dose Dsdv diskus inhaler  Generic drug: fluticasone furoate-vilanteroL  Inhale 1 puff into the lungs as needed.     cyproheptadine 4 mg tablet  Commonly known as: PERIACTIN  Take 1 tablet (4 mg total) by mouth 2 (two) times daily as needed (decreased appetite).     furosemide 20 MG tablet  Commonly known as: LASIX  Take 2 tablets (40 mg total) by mouth once daily.     meloxicam 15 MG tablet  Commonly known as: MOBIC  Take 1 tablet (15 mg total) by mouth daily as needed for Pain.              Indwelling Lines/Drains at time of discharge:   Lines/Drains/Airways       None                   Time spent on the discharge of patient: 31 minutes         Aryan Roger MD  Department of Hospital Medicine  [facility] - Telemetry ([facility])

## 2025-02-13 ENCOUNTER — HOSPITAL ENCOUNTER (OUTPATIENT)
Dept: CARDIOLOGY | Facility: HOSPITAL | Age: OVER 89
Discharge: HOME | End: 2025-02-13
Attending: INTERNAL MEDICINE
Payer: MEDICARE

## 2025-02-13 ENCOUNTER — OFFICE VISIT (OUTPATIENT)
Dept: INTERNAL MEDICINE | Facility: CLINIC | Age: OVER 89
End: 2025-02-13
Payer: MEDICARE

## 2025-02-13 ENCOUNTER — TELEPHONE (OUTPATIENT)
Dept: CARDIOLOGY | Facility: CLINIC | Age: OVER 89
End: 2025-02-13
Payer: MEDICARE

## 2025-02-13 VITALS
OXYGEN SATURATION: 96 % | HEIGHT: 60 IN | TEMPERATURE: 96 F | RESPIRATION RATE: 20 BRPM | BODY MASS INDEX: 22.98 KG/M2 | DIASTOLIC BLOOD PRESSURE: 54 MMHG | SYSTOLIC BLOOD PRESSURE: 108 MMHG | WEIGHT: 117.06 LBS | HEART RATE: 56 BPM

## 2025-02-13 DIAGNOSIS — Z79.01 CHRONIC ANTICOAGULATION: ICD-10-CM

## 2025-02-13 DIAGNOSIS — I48.0 PAROXYSMAL ATRIAL FIBRILLATION: ICD-10-CM

## 2025-02-13 DIAGNOSIS — R00.1 BRADYCARDIA: ICD-10-CM

## 2025-02-13 DIAGNOSIS — Z09 HOSPITAL DISCHARGE FOLLOW-UP: Primary | ICD-10-CM

## 2025-02-13 NOTE — TELEPHONE ENCOUNTER
[employer] [facility] called and advised that the patient is having an ongoing A fib flutter and heart rate it 45-69.  She tried to call the patient and he was not answering.

## 2025-02-17 NOTE — PROGRESS NOTES
Long Albarran  89 y.o.      Other male  [id number]    Chief Complaint:  Chief Complaint   Patient presents with    Hospital Follow Up       History of Present Illness:  History of Present Illness    CHIEF COMPLAINT:  Mr. Hines presents today for follow up after recent hospitalization for chest and back pain.    HISTORY OF PRESENT ILLNESS:  He was hospitalized from February 6th to February 8th for chest and back pain. CT, chest XR, and back x-ray showed no abnormalities.    MEDICATIONS:  He was started on Eliquis for anticoagulation, replacing his previous aspirin therapy. He denies any bleeding episodes since initiating Eliquis.         Review of Systems   Constitutional:  Negative for fever.   Cardiovascular:  Positive for chest pain.   Gastrointestinal:  Negative for blood in stool.   Genitourinary:  Negative for hematuria.   Musculoskeletal:  Positive for back pain.       Laboratory:  Lab Results   Component Value Date    WBC 5.94 02/08/2025    HGB 17.9 02/08/2025    HCT 52.0 02/08/2025     02/08/2025    CHOL 193 12/12/2024    TRIG 187 (H) 12/12/2024    HDL 43 12/12/2024    ALT 13 02/07/2025    AST 24 02/07/2025     02/08/2025    K 4.1 02/08/2025     02/08/2025    CREATININE 1.0 02/08/2025    BUN 27 02/08/2025    CO2 26 02/08/2025    TSH 0.894 01/21/2024    INR 1.0 02/07/2025     Lab Results   Component Value Date    LDLCALC 112.6 12/12/2024       History:  Past Medical History:   Diagnosis Date    A-fib     Diastolic dysfunction 12/2017    Hypertriglyceridemia        Medications:  Medications Ordered Prior to Encounter[1]    Allergies:  Review of patient's allergies indicates:  No Known Allergies    Exam:  Vitals:    02/13/25 1047   BP: (!) 108/54   Pulse: (!) 56   Resp: 20   Temp: 96.4 °F (35.8 °C)     Weight: 53.1 kg (117 lb 1 oz)   Body mass index is 25.33 kg/m².      Physical Exam    Vitals: Reviewed. Heart rate: 56.  Constitutional: No acute distress. Well-developed. Not  ill-appearing.  Eyes: No scleral icterus.  Cardiovascular: Bradycardia.  Pulmonary: Pulmonary effort is normal. No respiratory distress.   Skin: Warm. Dry..  Psychiatric: Behavior normal.         Assessment:  The primary encounter diagnosis was Hospital discharge follow-up. Diagnoses of Bradycardia, Paroxysmal atrial fibrillation, and Chronic anticoagulation were also pertinent to this visit.    Assessment & Plan    HOSPITAL FOLLOW UP:  - Mr. Cuevass heart rate is currently low at 56 bpm, improved from previous readings in the 30s.  - No dyspnea reported.  - Recent hospital visit for chest and back pain resulted in CTA, back x-ray, and chest XR, all showing no acute findings.  - Cardiologist is investigating the etiology of bradycardia.  - Eliquis (anticoagulant) prescribed to prevent thrombosis due to bradycardia.  - Cardiac monitoring scheduled for further investigation.  - Pacemaker implantation considered but deferred due to patient's advanced age (89 years old).    BRADYCARDIA:  - Ordered Holter monitor for further evaluation of cardiac electrical activity.  - Discussed cardiac electrical conduction abnormalities, using analogy of electrical issues in a house.    ATRIAL FIBRILLATION:  - Continued Eliquis    FOLLOW UP:  - Instructed patient to follow up with cardiology department for Holter monitor placement after current visit.  - Confirmed previous treatment for bradycardia and referral to a cardiologist.  - Follow up on annual appointment as scheduled.                        [1]   Current Outpatient Medications on File Prior to Visit   Medication Sig Dispense Refill    albuterol (ACCUNEB) 0.63 mg/3 mL Nebu Take 3 mLs (0.63 mg total) by nebulization every 6 (six) hours as needed (wheezing or shortness of breath). 75 mL 5    albuterol-ipratropium (DUO-NEB) 2.5 mg-0.5 mg/3 mL nebulizer solution Take by nebulization as needed for Wheezing.      apixaban (ELIQUIS) 2.5 mg Tab Take 1 tablet (2.5 mg total) by mouth 2  (two) times daily. 60 tablet 0    blood pressure monitor (BLOOD PRESSURE KIT) Kit 1 kit by Misc.(Non-Drug; Combo Route) route 2 (two) times daily. 1 each 0    BREO ELLIPTA 200-25 mcg/dose DsDv diskus inhaler Inhale 1 puff into the lungs as needed.      furosemide (LASIX) 20 MG tablet Take 2 tablets (40 mg total) by mouth once daily. 60 tablet 5    meloxicam (MOBIC) 15 MG tablet Take 1 tablet (15 mg total) by mouth daily as needed for Pain. 30 tablet 5     Current Facility-Administered Medications on File Prior to Visit   Medication Dose Route Frequency Provider Last Rate Last Admin    aspirin chewable tablet 81 mg  81 mg Oral Daily Herbert Guzman MD

## 2025-02-24 ENCOUNTER — TELEPHONE (OUTPATIENT)
Dept: UROLOGY | Facility: CLINIC | Age: OVER 89
End: 2025-02-24
Payer: MEDICARE

## 2025-02-24 ENCOUNTER — TELEPHONE (OUTPATIENT)
Dept: CARDIOLOGY | Facility: CLINIC | Age: OVER 89
End: 2025-02-24
Payer: MEDICARE

## 2025-02-24 NOTE — TELEPHONE ENCOUNTER
----- Message from Ana sent at 2/24/2025  2:37 PM CST -----  Contact: Marino  Type:  Patient Returning CallWho Called:Johan Left Message for Patient: Katerine the patient know what this is regarding?: blood in urineWould the patient rather a call back or a response via [facility]? Call [facility] Call Back Number:[phone] Information:

## 2025-02-24 NOTE — TELEPHONE ENCOUNTER
Left a message to return my call----- Message from Matilde sent at 2/24/2025 12:27 PM CST -----  Contact: Regine Hines  .Type:  Needs Medical AdviceWho Called: Regine TranSymptoms (please be specific): blood in urine How long has patient had these symptoms: a few daysPharmacy name and phone #: Would the patient rather a call back or a response via [facility]? [facility]: .[phone] Information: Pt has blood in the urine and Regine is wanting a call back in regard to what to do.

## 2025-02-24 NOTE — TELEPHONE ENCOUNTER
"Returned pt all there was no answer.       ----- Message from Ana sent at 2/24/2025  2:46 PM CST -----  Contact: Marino  Type:  Sooner Apoointment RequestCaller is requesting a sooner appointment.  Caller declined first available appointment listed below.  Caller will not accept being placed on the waitlist and is requesting a message be sent to doctor.Name of Caller: HiepWhen is the first available appointment? 05/2025Symptoms:blood in urineWould the patient rather a call back or a response via [facility]? [facility] Call Back Number:[phone] Information:Attempt to schedule same day appointment. If unable to schedule or pt declines, warm transfer to O as "Green Priority: 1 hour call back".  ----- Message -----  From: Ana Min  Sent: 2/24/2025   2:49 PM CST  To: Caitlyn ALMEIDA Staff    Type:  Sooner Apoointment RequestCaller is requesting a sooner appointment.  Caller declined first available appointment listed below.  Caller will not accept being placed on the waitlist and is requesting a message be sent to doctor.Name of Caller: MarinoWhen is the first available appointment? 05/2025Symptoms:blood in urineWould the patient rather a call back or a response via [facility]? [facility] Call Back Number:[phone] Information:  "

## 2025-02-24 NOTE — TELEPHONE ENCOUNTER
Attempted unsuccessfully to reach patient. Left voicemail for patient to call back to discuss concerns

## 2025-02-25 ENCOUNTER — TELEPHONE (OUTPATIENT)
Dept: INTERNAL MEDICINE | Facility: CLINIC | Age: OVER 89
End: 2025-02-25
Payer: MEDICARE

## 2025-02-25 ENCOUNTER — TELEPHONE (OUTPATIENT)
Dept: UROLOGY | Facility: CLINIC | Age: OVER 89
End: 2025-02-25
Payer: MEDICARE

## 2025-02-25 RX ORDER — ACETAMINOPHEN 500 MG
1 TABLET ORAL 2 TIMES DAILY
Qty: 1 EACH | Refills: 0 | Status: SHIPPED | OUTPATIENT
Start: 2025-02-25

## 2025-02-25 NOTE — TELEPHONE ENCOUNTER
Spoke with patient's son, he said that his dad is having blood in his urine since Saturday and he wants that Dr Brady see him soon. I rescheduled the Cystoscopy procedure for this week.      ----- Message from Maryellen sent at 2/25/2025  8:13 AM CST -----  Contact: Long  Mr. Alves needs a call back at .[phone] in regards to him peeing blood. He stated that he was peeing blood on Saturday & Sunday.Thanks

## 2025-02-25 NOTE — TELEPHONE ENCOUNTER
----- Message from Med Assistant Rodgers sent at 2/25/2025  4:14 PM CST -----  Contact: Marino  Type:  Needing Return CallWho Called: Deacon Call Back For: UpdateWould the patient rather a call back or a response via [facility]? [facility] Call Back Number: [phone] Information:

## 2025-02-27 ENCOUNTER — PROCEDURE VISIT (OUTPATIENT)
Dept: UROLOGY | Facility: CLINIC | Age: OVER 89
End: 2025-02-27
Payer: MEDICARE

## 2025-02-27 DIAGNOSIS — R31.0 GROSS HEMATURIA: Primary | ICD-10-CM

## 2025-02-27 PROCEDURE — 99214 OFFICE O/P EST MOD 30 MIN: CPT | Mod: 25,HCNC,S$GLB, | Performed by: UROLOGY

## 2025-02-27 PROCEDURE — 52000 CYSTOURETHROSCOPY: CPT | Mod: HCNC,S$GLB,, | Performed by: UROLOGY

## 2025-02-27 NOTE — PROGRESS NOTES
Chief Complaint:  Urgency and urge incontinence, hematuria    HPI:   02/27/2025 - returns today accompanied by his daughter for cystoscopy, continues to note intermittent hematuria, no clots, urgency and urgent continence improved    02/06/2025 - 89 yo male that presents for urge incontinence and hematuria.  Patient's daughter notes the urgency and urge incontinence has been going on for several years.  States that whenever he touches water whenever he washes his hands, washes dishes, he has the profound urge to urinate and asked to go to the bathroom but often times does not make it.  He does not currently wear any pads or diapers, just has to change his clothes when this happens.  Has a good stream and feels like he empties.  Does note some intermittent hematuria.  Has never seen a urologist before.  No UTIs or prior urologic procedures.  Denies family history of  cancers.    PMH:  Past Medical History:   Diagnosis Date    A-fib     Diastolic dysfunction 12/2017    Hypertriglyceridemia        PSH:  Past Surgical History:   Procedure Laterality Date    ABDOMINAL SURGERY      appendix removal in [place] 3 years ago    CATARACT EXTRACTION, BILATERAL         Family History:  Family History   Problem Relation Name Age of Onset    Cancer Neg Hx      Diabetes Neg Hx      Heart disease Neg Hx      Hypertension Neg Hx      Kidney disease Neg Hx      Stroke Neg Hx         Social History:  Social History     Tobacco Use    Smoking status: Former     Current packs/day: 0.00     Average packs/day: 1 pack/day for 61.0 years (61.0 ttl pk-yrs)     Types: Cigarettes     Start date: 1949     Quit date: 2010     Years since quitting: 15.1     Passive exposure: Past    Smokeless tobacco: Never   Substance Use Topics    Alcohol use: No     Alcohol/week: 0.0 standard drinks of alcohol    Drug use: No        Review of Systems:  General: No fever, chills  Skin: No rashes  Chest:  Denies cough and sputum production  Heart: Denies  chest pain  Resp: Denies dyspnea  Abdomen: Denies diarrhea, abdominal pain, hematemesis, or blood in stool.  Musculoskeletal: No joint stiffness or swelling. Denies back pain.  : see HPI  Neuro: no dizziness or weakness    Allergies:  Patient has no known allergies.    Medications:    Current Outpatient Medications:     albuterol (ACCUNEB) 0.63 mg/3 mL Nebu, Take 3 mLs (0.63 mg total) by nebulization every 6 (six) hours as needed (wheezing or shortness of breath)., Disp: 75 mL, Rfl: 5    albuterol-ipratropium (DUO-NEB) 2.5 mg-0.5 mg/3 mL nebulizer solution, Take by nebulization as needed for Wheezing., Disp: , Rfl:     apixaban (ELIQUIS) 2.5 mg Tab, Take 1 tablet (2.5 mg total) by mouth 2 (two) times daily., Disp: 60 tablet, Rfl: 0    blood pressure monitor (BLOOD PRESSURE KIT) Kit, 1 kit by Misc.(Non-Drug; Combo Route) route 2 (two) times daily., Disp: 1 each, Rfl: 0    BREO ELLIPTA 200-25 mcg/dose DsDv diskus inhaler, Inhale 1 puff into the lungs as needed., Disp: , Rfl:     furosemide (LASIX) 20 MG tablet, Take 2 tablets (40 mg total) by mouth once daily., Disp: 60 tablet, Rfl: 5    meloxicam (MOBIC) 15 MG tablet, Take 1 tablet (15 mg total) by mouth daily as needed for Pain., Disp: 30 tablet, Rfl: 5    Current Facility-Administered Medications:     aspirin chewable tablet 81 mg, 81 mg, Oral, Daily, Herbert Guzman MD    Physical Exam:  There were no vitals filed for this visit.    There is no height or weight on file to calculate BMI.  General: awake, alert, cooperative  Head: NC/AT  Ears: external ears normal  Eyes: sclera normal  Lungs: normal inspiration, NAD  Heart: well-perfused  Skin: The skin is warm and dry  Ext: No c/c/e.  Neuro: grossly intact, AOx3    RADIOLOGY:  CT ANGIOGRAM ABDOMEN, 11/23/2024 1:31 PM     COMPARISON: CT abdomen 5/29/2024     HISTORY: Mesenteric ischemia     TECHNIQUE: Helically acquired axial CT angiographic images were obtained of the abdomen and pelvis with and without IV  contrast. Coronal and sagittal MIP and 3-D volume rendered reformatted images were made.     Automated exposure control was used for dose reduction.     FINDINGS:   VASCULAR FINDINGS:   Scattered vascular calcifications. Abdominal aorta is normal in caliber. There is no intramural hematoma, dissection, or aneurysm. Celiac artery and branches are widely patent. Superior mesenteric artery is patent. Solitary renal arteries bilaterally which are widely patent. Inferior mesenteric artery is patent. Common iliac, internal, and external iliac arteries are widely patent.     NONVASCULAR FINDINGS:   The liver, spleen, adrenal glands are normal. Fatty atrophy of the pancreas without focal mass or ductal dilatation. Hypoattenuating right upper pole renal lesion suggestive of a cyst. No enhancing renal mass, renal calculus or hydronephrosis.. Gallbladder is unremarkable.     Ball is within normal limits. There are no inflammatory changes about the bowel. There is no free fluid or free air. Prostate is unremarkable. Urinary bladder is unremarkable. Small bilateral fat-containing of focal hernias.     Diffuse demineralization. No acute or suspicious bone abnormality.     LABS:  I personally reviewed the following lab values:  Lab Results   Component Value Date    WBC 5.94 02/08/2025    HGB 17.9 02/08/2025    HCT 52.0 02/08/2025     02/08/2025     02/08/2025    K 4.1 02/08/2025     02/08/2025    CREATININE 1.0 02/08/2025    BUN 27 02/08/2025    CO2 26 02/08/2025    TSH 0.894 01/21/2024    INR 1.0 02/07/2025    CHOL 193 12/12/2024    TRIG 187 (H) 12/12/2024    HDL 43 12/12/2024    ALT 13 02/07/2025    AST 24 02/07/2025     Procedure:  Diagnostic Cystoscopy    Indications: gross hematuria    UA: normal, see lab results for values    Procedure in Detail: After proper consents were obtained, the patient was prepped and draped in normal sterile fashion for diagnostic cystoscopy. 5 ml of lidocaine jelly was instilled  in the urethra. The flexible cystoscope was then introduced into the urethra, and advanced into the bladder under direct vision. There was a stricture 2-3cm proximal to the meatus which accommodated the scope. The urethral mucosa appeared normal, and no other strictures were noted. The sphincter was normal, and the veru montanum was unremarkable. The prostatic mucosa and the lateral lobes of the prostate were unremarkable, with a hypervascular prostate and bilobar hypertrophy with incomplete visual obstruction. The bladder neck was normal. Inspection of the interior of the bladder was then carried out. The trigone was unremarkable, with no mucosal lesions. The ureteral orifices were normal in position and configuration. Systematic inspection of the mucosa of the bladder it was then carried out, rotating the cystoscope so that all areas of the left and right lateral walls, the dome of the bladder, and the posterior wall were all visualized. The cystoscope was then advanced further into the bladder, and maximum deflection of the scope was performed so that the bladder neck could be inspected. No mucosal lesions were noted there. The cystoscope was then removed, and the procedure terminated. Patient tolerated the procedure well. No complications.     Findings:  No tumors, hypervascular prostate and urethral stricture    Assessment/Plan:   Long Hines is a 89 y.o. male with:    Urge incontinence - never received the Gemtesa but symptoms improved, hold off for now    Constipation - recommend Metamucil daily    Hematuria - cysto notable for hypervascular prostate, that combined with the Eliquis is the most likely etiology of his hematuria    - f/u 3 months with KERVIN Brady MD  Urology

## 2025-03-04 ENCOUNTER — RESULTS FOLLOW-UP (OUTPATIENT)
Dept: CARDIOLOGY | Facility: HOSPITAL | Age: OVER 89
End: 2025-03-04

## 2025-03-07 RX ORDER — FUROSEMIDE 20 MG/1
40 TABLET ORAL DAILY
Qty: 60 TABLET | Refills: 5 | Status: SHIPPED | OUTPATIENT
Start: 2025-03-07

## 2025-03-07 RX ORDER — FUROSEMIDE 20 MG/1
40 TABLET ORAL DAILY
Qty: 180 TABLET | Refills: 3 | Status: SHIPPED | OUTPATIENT
Start: 2025-03-07

## 2025-03-07 NOTE — TELEPHONE ENCOUNTER
----- Message from Rachna sent at 3/7/2025  8:59 AM CST -----  Contact: Daughter  .Type:  RX Refill RequestWho Called: Patient/ DaughterRefill or New Rx:refillRX Name and Strength:apixaban (ELIQUIS) 2.5 mg Tab, and furosemide (LASIX) 20 MG tabletHow is the patient currently taking it? (ex. 1XDay):N/AIs this a 30 day or 90 day RX:30 OR 90Preferred Pharmacy with phone number:.[id number] - [place], [place] - [address] [facility] [address] [address]Mqxsu: [phone] Fax: [phone] or Mail Order:LocalOrdering Provider:Bethel Cross MD Would the patient rather a call back or a response via [facility]? [email or web address] Call Back Number:.[phone] (home) Additional Information:

## 2025-03-17 RX ORDER — ACETAMINOPHEN 500 MG
1 TABLET ORAL 2 TIMES DAILY
Qty: 1 EACH | Refills: 0 | Status: SHIPPED | OUTPATIENT
Start: 2025-03-17

## 2025-04-01 RX ORDER — FUROSEMIDE 20 MG/1
40 TABLET ORAL DAILY
Qty: 180 TABLET | Refills: 3 | Status: SHIPPED | OUTPATIENT
Start: 2025-04-01

## 2025-04-22 RX ORDER — FUROSEMIDE 20 MG/1
40 TABLET ORAL DAILY
Qty: 180 TABLET | Refills: 3 | Status: SHIPPED | OUTPATIENT
Start: 2025-04-22

## 2025-04-25 ENCOUNTER — TELEPHONE (OUTPATIENT)
Dept: ADMINISTRATIVE | Facility: CLINIC | Age: OVER 89
End: 2025-04-25
Payer: MEDICARE

## 2025-04-25 NOTE — TELEPHONE ENCOUNTER
Called pt; no answer; left message informing pt I was calling to confirm pt's 5/2/25 home visit for pt's eawv appt and to make sure that appt date still worked for pt and provider would call pt 24-48 hours before the scheduled appt date to confirm appt time; left my name and number for pt to return my call if pt had any questions or concerns.

## 2025-05-04 ENCOUNTER — NURSE TRIAGE (OUTPATIENT)
Dept: ADMINISTRATIVE | Facility: CLINIC | Age: OVER 89
End: 2025-05-04
Payer: MEDICARE

## 2025-05-04 DIAGNOSIS — R06.02 SHORTNESS OF BREATH: ICD-10-CM

## 2025-05-05 RX ORDER — ALBUTEROL SULFATE 0.63 MG/3ML
0.63 SOLUTION RESPIRATORY (INHALATION) EVERY 6 HOURS PRN
Qty: 225 ML | Refills: 3 | Status: SHIPPED | OUTPATIENT
Start: 2025-05-05

## 2025-05-05 RX ORDER — FUROSEMIDE 20 MG/1
40 TABLET ORAL DAILY
Qty: 60 TABLET | Refills: 5 | Status: SHIPPED | OUTPATIENT
Start: 2025-05-05 | End: 2025-05-07 | Stop reason: SDUPTHER

## 2025-05-05 NOTE — TELEPHONE ENCOUNTER
Patient's daughter calling in to report the patient has been having a decreased appetite and she is requesting a prescription for a medication to help with his appetite. She also reports he had some dizziness a few days ago but declined triage. Will route message to MD office. Instructed to call back with additional questions or worsening of symptoms. Patient verbalized understanding.     Reason for Disposition   Prescription request for new medicine (not a refill)    Protocols used: Medication Refill and Renewal Call-A-AH

## 2025-05-05 NOTE — TELEPHONE ENCOUNTER
Called pt . Spoke with pt daughter . Informed her that he would need an appt to be evaluated before any medication is prescribed . Pt daughter stated understanding . Pt daughter declined next available and said there [place] and will just keep up coming appt next month .

## 2025-05-05 NOTE — TELEPHONE ENCOUNTER
Refill Decision Note   Long Hines  is requesting a refill authorization.  Brief Assessment and Rationale for Refill:  Approve     Medication Therapy Plan:         Comments:     Note composed:12:59 PM 05/05/2025

## 2025-05-05 NOTE — TELEPHONE ENCOUNTER
No care due was identified.  [facility] Embedded Care Due Messages. Reference number: [id number].   5/04/2025 8:44:13 PM CDT

## 2025-05-08 RX ORDER — FUROSEMIDE 20 MG/1
40 TABLET ORAL DAILY
Qty: 60 TABLET | Refills: 5 | Status: SHIPPED | OUTPATIENT
Start: 2025-05-08

## 2025-05-27 ENCOUNTER — OFFICE VISIT (OUTPATIENT)
Dept: UROLOGY | Facility: CLINIC | Age: OVER 89
End: 2025-05-27
Payer: MEDICARE

## 2025-05-27 VITALS
BODY MASS INDEX: 22.98 KG/M2 | WEIGHT: 117.06 LBS | DIASTOLIC BLOOD PRESSURE: 70 MMHG | RESPIRATION RATE: 14 BRPM | HEIGHT: 60 IN | HEART RATE: 59 BPM | SYSTOLIC BLOOD PRESSURE: 142 MMHG

## 2025-05-27 DIAGNOSIS — R31.0 GROSS HEMATURIA: Primary | ICD-10-CM

## 2025-05-27 PROBLEM — U07.1 COVID-19: Status: RESOLVED | Noted: 2024-01-21 | Resolved: 2025-05-27

## 2025-05-27 PROBLEM — R00.1 BRADYCARDIA: Status: RESOLVED | Noted: 2022-01-26 | Resolved: 2025-05-27

## 2025-05-27 PROBLEM — R06.02 SHORTNESS OF BREATH: Status: RESOLVED | Noted: 2022-01-26 | Resolved: 2025-05-27

## 2025-05-27 PROCEDURE — 3288F FALL RISK ASSESSMENT DOCD: CPT | Mod: CPTII,S$GLB,, | Performed by: NURSE PRACTITIONER

## 2025-05-27 PROCEDURE — 1126F AMNT PAIN NOTED NONE PRSNT: CPT | Mod: CPTII,S$GLB,, | Performed by: NURSE PRACTITIONER

## 2025-05-27 PROCEDURE — 1159F MED LIST DOCD IN RCRD: CPT | Mod: CPTII,S$GLB,, | Performed by: NURSE PRACTITIONER

## 2025-05-27 PROCEDURE — 99214 OFFICE O/P EST MOD 30 MIN: CPT | Mod: S$GLB,,, | Performed by: NURSE PRACTITIONER

## 2025-05-27 PROCEDURE — 1160F RVW MEDS BY RX/DR IN RCRD: CPT | Mod: CPTII,S$GLB,, | Performed by: NURSE PRACTITIONER

## 2025-05-27 PROCEDURE — 99999 PR PBB SHADOW E&M-EST. PATIENT-LVL III: CPT | Mod: PBBFAC,,, | Performed by: NURSE PRACTITIONER

## 2025-05-27 PROCEDURE — 1101F PT FALLS ASSESS-DOCD LE1/YR: CPT | Mod: CPTII,S$GLB,, | Performed by: NURSE PRACTITIONER

## 2025-05-27 NOTE — PROGRESS NOTES
Chief Complaint:   Gross hematuria    HPI:   Patient is a 89-year-old male that is presenting as a follow-up to gross hematuria.  Was recently seen by Dr. Brady, normal cystoscopy.  Patient is also on Eliquis.  Presenting with daughter.  Daughter states patient denies gross hematuria since his last visit.  Urine in clinic indicates trace blood, protein and bilirubin, all other parameters are negative.  02/06/2025  Caitlyn Wisetrace Hines is a 89 y.o. male that presents today as a referral from STEPAN Mukherjee for urge incontinence and hematuria.  Patient's daughter notes the urgency and urge incontinence has been going on for several years.  States that whenever he touches water whenever he washes his hands, washes dishes, he has the profound urge to urinate and asked to go to the bathroom but often times does not make it.  He does not currently wear any pads or diapers, just has to change his clothes when this happens.  Has a good stream and feels like he empties.  Does note some intermittent hematuria.  Has never seen a urologist before.  No UTIs or prior urologic procedures.  Denies family history of  cancers.   Allergies:  Patient has no known allergies.    Medications:  has a current medication list which includes the following prescription(s): albuterol, albuterol-ipratropium, apixaban, blood pressure monitor, breo ellipta, furosemide, and meloxicam, and the following Facility-Administered Medications: aspirin.    Review of Systems:  General: No fever, chills, fatigability, or weight loss.  Skin: No rashes, itching, or changes in color or texture of skin.  Chest: Denies COLLIER, cyanosis, wheezing, cough, and sputum production.  Abdomen: Appetite fine. No weight loss. Denies diarrhea, abdominal pain, hematemesis, or blood in stool.  Musculoskeletal: No joint stiffness or swelling. Denies back pain.  : As above.  All other review of systems negative.    PMH:   has a past medical history of A-fib, Diastolic dysfunction  (12/2017), and Hypertriglyceridemia.    PSH:   has a past surgical history that includes Abdominal surgery and Cataract extraction, bilateral.    FamHx: family history is not on file.    SocHx:  reports that he quit smoking about 15 years ago. His smoking use included cigarettes. He started smoking about 76 years ago. He has a 61 pack-year smoking history. He has been exposed to tobacco smoke. He has never used smokeless tobacco. He reports that he does not drink alcohol and does not use drugs.      Physical Exam:  Vitals:    05/27/25 0947   BP: (!) 142/70   Pulse: (!) 59   Resp: 14     General: A&Ox3, no apparent distress, no deformities  Neck: No masses, normal thyroid  Lungs: normal inspiration, no use of accessory muscles  Heart: normal pulse, no arrhythmias  Abdomen: Soft, NT, ND, no masses, no hernias, no hepatosplenomegaly  Lymphatic: Neck and groin nodes negative  Skin: The skin is warm and dry. No jaundice.    Labs/Studies:   See HPI  Impression/Plan:   Gross hematuria  Patient has not had gross hematuria since his visit with Dr. Frazier for cystoscopy.  Daughter is aware of the need to contact clinic if gross hematuria returns.  Patient to return to clinic for re-evaluation in 6-8 months.

## 2025-05-30 ENCOUNTER — OFFICE VISIT (OUTPATIENT)
Dept: CARDIOLOGY | Facility: CLINIC | Age: OVER 89
End: 2025-05-30
Payer: MEDICARE

## 2025-05-30 VITALS
BODY MASS INDEX: 24.82 KG/M2 | RESPIRATION RATE: 16 BRPM | HEART RATE: 66 BPM | OXYGEN SATURATION: 97 % | HEIGHT: 60 IN | SYSTOLIC BLOOD PRESSURE: 147 MMHG | DIASTOLIC BLOOD PRESSURE: 71 MMHG | WEIGHT: 126.44 LBS

## 2025-05-30 DIAGNOSIS — I70.0 ATHEROSCLEROSIS OF AORTA: ICD-10-CM

## 2025-05-30 DIAGNOSIS — I48.0 PAROXYSMAL ATRIAL FIBRILLATION: Primary | ICD-10-CM

## 2025-05-30 DIAGNOSIS — I50.32 CHRONIC DIASTOLIC CONGESTIVE HEART FAILURE: ICD-10-CM

## 2025-05-30 DIAGNOSIS — I63.9 CEREBROVASCULAR ACCIDENT (CVA), UNSPECIFIED MECHANISM: ICD-10-CM

## 2025-05-30 PROCEDURE — 99999 PR PBB SHADOW E&M-EST. PATIENT-LVL IV: CPT | Mod: PBBFAC,,, | Performed by: INTERNAL MEDICINE

## 2025-05-30 PROCEDURE — 1159F MED LIST DOCD IN RCRD: CPT | Mod: CPTII,S$GLB,, | Performed by: INTERNAL MEDICINE

## 2025-05-30 PROCEDURE — 1101F PT FALLS ASSESS-DOCD LE1/YR: CPT | Mod: CPTII,S$GLB,, | Performed by: INTERNAL MEDICINE

## 2025-05-30 PROCEDURE — 99214 OFFICE O/P EST MOD 30 MIN: CPT | Mod: S$GLB,,, | Performed by: INTERNAL MEDICINE

## 2025-05-30 PROCEDURE — 3288F FALL RISK ASSESSMENT DOCD: CPT | Mod: CPTII,S$GLB,, | Performed by: INTERNAL MEDICINE

## 2025-05-30 NOTE — PROGRESS NOTES
Subjective:   Patient ID:  Long Albarran is a 89 y.o. male who presents for follow-up of No chief complaint on file.  Recent hospital stay at [facility]:  Reason for Hospitalization   Patient is a 89-year-old male with bradycardia, HLD, and A-fib presenting for an episodes of generalized weakness. Patient was in her usual state of health (ambulatory, independent in ADLs) until yesterday at 11:30 PM when his family noticed that he sounded weak when he was speaking and had difficulty standing up assistance. This prompted them to bring him to the emergency room. ED workup notable for bradycardia to the 40s, , troponin 0.05, CT head with encephalomalacia/cerebral cortical atrophy/acute/non acute. EKG with A-fib and persistent inferior/inferior T wave changes. Cardiology consulted by ED.    Per my interview, daughter served as . Patient states he feels fine right now he is oriented to self/city, but not year which is baseline per daughter at bedside. Inpatient workup notable for old infarcts on MRI/CTA, no new findings. TTE reassuring. Likely etiology is TIA. Currently patient is back to baseline, walking with RW and eating full diet. Approached goals of care with patient and family. They do not wish for PPM but do want patient to be full code.    Longstanding persistent atrial fibrillation (HCC)  Patient with known history of A-fib/bradycardia with monitor on 2/24 right AV block. Recently evaluated during prior admission, considered PPM. Suspect generalized weakness 2/2 symptomatic bradycardia. New dynamic T wave changes with mild trop elevation, no chest pain  -Evaluated by cardiology. Family and patient not interested in PPM due to his age.    Aphasia  Resolved on admission    Acute encephalopathy  Presenting after an episode of difficulty speaking/generalized weakness. Currently back to baseline, possible TIA however history appears non-focal. CT head with chronic encephalomalacia. Likely etiology is  TIA.  -CTA head and neck/MRI brain with chronic small CVA's, no new findings  -TTE with afib, normal EF.  - Ambulating with PT/OT with RW, delivered bedside. Eating normal diet  -Likely etiology is TIA, continue eliquis. Due to age and functional status, I believe statin would cause more harm with msk side effects limiting his quality of life vs risk reduction of TIA.    Today  Since discharge some weakness, speech good      Atrial Fibrillation  Presents for follow-up visit. Symptoms are negative for bradycardia, chest pain, dizziness, palpitations, shortness of breath, syncope, tachycardia and weakness. The symptoms have been stable. There are no medication compliance problems.   Congestive Heart Failure  Presents for follow-up visit. Pertinent negatives include no chest pain, chest pressure, claudication, edema, fatigue, muscle weakness, orthopnea, palpitations, paroxysmal nocturnal dyspnea, shortness of breath or unexpected weight change. The symptoms have been stable. Compliance with total regimen is %. Compliance with diet is %. Compliance with exercise is %. Compliance with medications is %.       Review of Systems   Constitutional: Negative. Negative for fatigue, unexpected weight change and weight gain.   HENT: Negative.     Eyes: Negative.    Cardiovascular: Negative.  Negative for chest pain, claudication, leg swelling, palpitations and syncope.   Respiratory:  Negative for shortness of breath.    Endocrine: Negative.    Hematologic/Lymphatic: Negative.    Skin: Negative.    Musculoskeletal:  Negative for muscle weakness.   Gastrointestinal: Negative.    Genitourinary: Negative.    Neurological: Negative.  Negative for dizziness and weakness.   Psychiatric/Behavioral: Negative.     Allergic/Immunologic: Negative.    All other systems reviewed and are negative.    Family History   Problem Relation Name Age of Onset    Cancer Neg Hx      Diabetes Neg Hx      Heart disease Neg Hx       Hypertension Neg Hx      Kidney disease Neg Hx      Stroke Neg Hx       Past Medical History:   Diagnosis Date    A-fib     Diastolic dysfunction 12/2017    Hypertriglyceridemia      Social History[1]  Medications Ordered Prior to Encounter[2]  Review of patient's allergies indicates:  No Known Allergies    Objective:     Physical Exam  Vitals and nursing note reviewed.   Constitutional:       Appearance: He is well-developed.   HENT:      Head: Normocephalic and atraumatic.   Eyes:      Conjunctiva/sclera: Conjunctivae normal.      Pupils: Pupils are equal, round, and reactive to light.   Cardiovascular:      Rate and Rhythm: Normal rate. Rhythm irregular.      Pulses: Intact distal pulses.      Heart sounds: Normal heart sounds.   Pulmonary:      Effort: Pulmonary effort is normal.      Breath sounds: Normal breath sounds.   Abdominal:      General: Bowel sounds are normal.      Palpations: Abdomen is soft.   Musculoskeletal:      Cervical back: Normal range of motion and neck supple.   Skin:     General: Skin is warm and dry.   Neurological:      Mental Status: He is alert and oriented to person, place, and time.         Assessment:     1. Paroxysmal atrial fibrillation    2. Atherosclerosis of aorta    3. Chronic diastolic congestive heart failure        Plan:     Paroxysmal atrial fibrillation    Atherosclerosis of aorta    Chronic diastolic congestive heart failure      Continue lasix- CHF  Continue eliquis - afib/CVA       [1]   Social History  Socioeconomic History    Marital status: Single   Tobacco Use    Smoking status: Former     Current packs/day: 0.00     Average packs/day: 1 pack/day for 61.0 years (61.0 ttl pk-yrs)     Types: Cigarettes     Start date: 1949     Quit date: 2010     Years since quitting: 15.4     Passive exposure: Past    Smokeless tobacco: Never   Substance and Sexual Activity    Alcohol use: No     Alcohol/week: 0.0 standard drinks of alcohol    Drug use: No    Sexual activity: Not  Currently     Social Drivers of Health     Financial Resource Strain: Medium Risk (9/16/2024)    Overall Financial Resource Strain (CARDIA)     Difficulty of Paying Living Expenses: Somewhat hard   Food Insecurity: Food Insecurity Present (5/28/2025)    [facility] from [facility] and Its Subsidiaries and Affiliates    Hunger Vital Sign     Worried About Running Out of Food in the Last Year: Sometimes true     Ran Out of Food in the Last Year: Sometimes true   Transportation Needs: No Transportation Needs (5/28/2025)    [facility]    PRAPARE - Transportation     Lack of Transportation (Medical): No     Lack of Transportation (Non-Medical): No   Physical Activity: Insufficiently Active (9/16/2024)    Exercise Vital Sign     Days of Exercise per Week: 4 days     Minutes of Exercise per Session: 30 min   Stress: No Stress Concern Present (9/16/2024)    [place] [employer]ational [employer] - Occupational Stress Questionnaire     Feeling of Stress : Not at all   Housing Stability: Low Risk  (5/28/2025)    [facility]    Housing Stability Vital Sign     Unable to Pay for Housing in the Last Year: No     Number of Times Moved in the Last Year: 1     Homeless in the Last Year: No   [2]   Current Outpatient Medications on File Prior to Visit   Medication Sig Dispense Refill    albuterol (ACCUNEB) 0.63 mg/3 mL Nebu Take 3 mLs (0.63 mg total) by nebulization every 6 (six) hours as needed (wheezing or shortness of breath). 225 mL 3    albuterol-ipratropium (DUO-NEB) 2.5 mg-0.5 mg/3 mL nebulizer solution Take by nebulization as needed for Wheezing.      apixaban (ELIQUIS) 2.5 mg Tab Take 1 tablet (2.5 mg total) by mouth 2 (two) times daily. 60 tablet 11    blood pressure monitor (BLOOD PRESSURE KIT) Kit 1 kit by  Misc.(Non-Drug; Combo Route) route 2 (two) times daily. 1 each 0    BREO ELLIPTA 200-25 mcg/dose DsDv diskus inhaler Inhale 1 puff into the lungs as needed.      furosemide (LASIX) 20 MG tablet Take 2 tablets (40 mg total) by mouth once daily. 60 tablet 5    meloxicam (MOBIC) 15 MG tablet Take 1 tablet (15 mg total) by mouth daily as needed for Pain. 30 tablet 5     Current Facility-Administered Medications on File Prior to Visit   Medication Dose Route Frequency Provider Last Rate Last Admin    aspirin chewable tablet 81 mg  81 mg Oral Daily Herbert Guzman MD        [DISCONTINUED] aluminum-magnesium hydroxide-simethicone 200-200-20 mg/5 mL suspension  30 mL Oral TID PRN Provider, Generic External Data        [DISCONTINUED] apixaban tablet  2.5 mg Oral  Provider, Generic External Data        [DISCONTINUED] fluticasone furoate-vilanteroL 200-25 mcg/dose diskus inhaler  1 puff Inhalation  Provider, Generic External Data        [DISCONTINUED] furosemide tablet  40 mg Oral  Provider, Generic External Data        [DISCONTINUED] GENERIC EXTERNAL MEDICATION     Provider, Generic External Data        [DISCONTINUED] GENERIC EXTERNAL MEDICATION     Provider, Generic External Data        [DISCONTINUED] GENERIC EXTERNAL MEDICATION     Provider, Generic External Data        [DISCONTINUED] GENERIC EXTERNAL MEDICATION     Provider, Generic External Data        [DISCONTINUED] magnesium hydroxide 400 mg/5 ml suspension  30 mL Oral BID PRN Provider, Generic External Data

## 2025-06-05 DIAGNOSIS — R06.02 SHORTNESS OF BREATH: ICD-10-CM

## 2025-06-06 RX ORDER — ALBUTEROL SULFATE 0.63 MG/3ML
0.63 SOLUTION RESPIRATORY (INHALATION) EVERY 6 HOURS PRN
Qty: 916 ML | Refills: 2 | Status: SHIPPED | OUTPATIENT
Start: 2025-06-06

## 2025-06-12 ENCOUNTER — OFFICE VISIT (OUTPATIENT)
Dept: INTERNAL MEDICINE | Facility: CLINIC | Age: OVER 89
End: 2025-06-12
Payer: MEDICARE

## 2025-06-12 VITALS
HEIGHT: 60 IN | WEIGHT: 125.25 LBS | OXYGEN SATURATION: 100 % | BODY MASS INDEX: 24.59 KG/M2 | HEART RATE: 60 BPM | DIASTOLIC BLOOD PRESSURE: 54 MMHG | SYSTOLIC BLOOD PRESSURE: 124 MMHG | TEMPERATURE: 97 F

## 2025-06-12 DIAGNOSIS — I48.0 PAROXYSMAL ATRIAL FIBRILLATION: ICD-10-CM

## 2025-06-12 DIAGNOSIS — Z00.00 ANNUAL PHYSICAL EXAM: Primary | ICD-10-CM

## 2025-06-12 DIAGNOSIS — I10 ESSENTIAL HYPERTENSION: ICD-10-CM

## 2025-06-12 DIAGNOSIS — R29.898 WEAKNESS OF BOTH LOWER EXTREMITIES: ICD-10-CM

## 2025-06-12 DIAGNOSIS — Z86.73 HISTORY OF CVA (CEREBROVASCULAR ACCIDENT): ICD-10-CM

## 2025-06-12 PROCEDURE — 99999 PR PBB SHADOW E&M-EST. PATIENT-LVL IV: CPT | Mod: PBBFAC,HCNC,, | Performed by: INTERNAL MEDICINE

## 2025-06-12 NOTE — PROGRESS NOTES
Long Albarran  89 y.o.      Other male  [id number]    Chief Complaint:  Chief Complaint   Patient presents with    Annual Exam       History of Present Illness:  History of Present Illness    CHIEF COMPLAINT:  Mr. Hines presents today for annual follow-up visit    RECENT HOSPITALIZATION:  He was recently hospitalized at [facility] for 3 days with diagnoses of TIA, atrial fibrillation, hypertension, and generalized weakness.    CURRENT SYMPTOMS:  He reports bilateral leg weakness. He denies dizziness and swallowing difficulties.    NEUROLOGICAL HISTORY:  MRI showed small areas in the right parietal lobe consistent with remote micro hemorrhage and small remote bilateral cerebellar hemisphere and occipital lobe infarcts.    MEDICAL HISTORY:  He has a history of hypertension, atrial fibrillation and resolved hematuria.    MEDICATIONS:  He takes Eliquis and a diuretic daily, and uses an inhaler as needed for breathing difficulties.         Review of Systems   Constitutional:  Negative for fever.   HENT:  Negative for nosebleeds.    Respiratory:  Negative for shortness of breath.    Cardiovascular:  Negative for chest pain and leg swelling.   Gastrointestinal:  Negative for blood in stool.   Genitourinary:  Negative for hematuria (resolved).   Neurological:  Positive for weakness. Negative for dizziness and speech change.       Laboratory  Lab Results   Component Value Date    WBC 5.94 02/08/2025    HGB 17.9 02/08/2025    HCT 52.0 02/08/2025     02/08/2025    CHOL 178 05/28/2025    TRIG 202 (H) 05/28/2025    HDL 43 05/28/2025    ALT 13 02/07/2025    AST 24 02/07/2025     02/08/2025    K 4.1 02/08/2025     02/08/2025    CREATININE 1.0 02/08/2025    BUN 27 02/08/2025    CO2 26 02/08/2025    TSH 0.896 05/28/2025    INR 1.0 02/07/2025     Lab Results   Component Value Date    LDLCALC 95 05/28/2025       The following were reviewed: Active problem list, medication list, allergies, family  history, social history, and Health Maintenance.     History:  Past Medical History:   Diagnosis Date    A-fib     Diastolic dysfunction 12/2017    Hypertriglyceridemia      Past Surgical History:   Procedure Laterality Date    ABDOMINAL SURGERY      appendix removal in [place] 3 years ago    CATARACT EXTRACTION, BILATERAL       Family History   Problem Relation Name Age of Onset    Cancer Neg Hx      Diabetes Neg Hx      Heart disease Neg Hx      Hypertension Neg Hx      Kidney disease Neg Hx      Stroke Neg Hx       Social History[1]  Problem List[2]  Review of patient's allergies indicates:  No Known Allergies    Health Maintenance  Health Maintenance Topics with due status: Not Due       Topic Last Completion Date    Lipid Panel 05/28/2025     There are no preventive care reminders to display for this patient.    Medications:  Medications Ordered Prior to Encounter[3]    Medications have been reviewed and reconciled with patient at visit today.    Exam:  Vitals:    06/12/25 1042   BP: (!) 124/54   Pulse: 60   Temp: 97.3 °F (36.3 °C)     Weight: 56.8 kg (125 lb 3.5 oz)   Body mass index is 27.1 kg/m².      Physical Exam    Vitals: Reviewed. Blood pressure: 124/54.  Constitutional: No acute distress. Well-developed. Not ill-appearing.  Eyes: No scleral icterus..  Cardiovascular: Normal rate and regular rhythm. Normal heart sounds.  Pulmonary: Pulmonary effort is normal. No respiratory distress. Normal breath sounds.  Abdomen: Soft. Nontender. Nondistended. Normoactive bowel sounds.  Extremities: No edema.  Skin: Warm. Dry.  Neurological: Alert.  Psychiatric: Behavior normal.  Musculoskeletal: Weakness in both legs.         Assessment:  The primary encounter diagnosis was Annual physical exam. Diagnoses of Essential hypertension, Paroxysmal atrial fibrillation, Weakness of both lower extremities, and History of CVA (cerebrovascular accident) were also pertinent to this visit.    Assessment & Plan    ANNUAL PHYSICAL  EXAM:  - Counseled regarding age appropriate screenings and immunizations  - Counseled regarding lifestyle modifications    HYPERTENSION:  - Monitored the patient's blood pressure, which is well-controlled at 124/54 with current diuretic therapy.  - Discussed maintaining current blood pressure management without adding new medication.  - Continued diuretic at current dose.    ATRIAL FIBRILLATION:  - Monitored the patient's atrial fibrillation, currently managed with Eliquis.  - Discussed this condition as a significant risk factor for stroke, which the patient has experienced in the past.  - Clarified that while Eliquis helps prevent strokes related to atrial fibrillation, other factors can still cause strokes.  - Continued Eliquis at current dose for long-term anticoagulation therapy and stroke prevention.    MUSCLE WEAKNESS:  - Assessed generalized muscle weakness, particularly noted in both legs.    CEREBRAL HEMORRHAGE AND INFARCTION:  - Reviewed MRI findings showing evidence of past small strokes, including remote microhemorrhage in the right parietal lobe and small remote bilateral cerebellar hemisphere infarcts.  - Explained that these small strokes may have occurred without noticeable symptoms at the time.    STROKE RISK FACTORS:  - Considered risk factors for stroke, including advanced age (89 years), hypertension, and atrial fibrillation.    FOLLOW-UP:  - Determined neurology referral necessary for stroke-related care.  - [facility] and instructed patient to schedule appointment with neurologist.                      [1]   Social History  Socioeconomic History    Marital status: Single   Tobacco Use    Smoking status: Former     Current packs/day: 0.00     Average packs/day: 1 pack/day for 61.0 years (61.0 ttl pk-yrs)     Types: Cigarettes     Start date: 1949     Quit date: 2010     Years since quitting: 15.4     Passive exposure: Past    Smokeless  tobacco: Never   Substance and Sexual Activity    Alcohol use: No     Alcohol/week: 0.0 standard drinks of alcohol    Drug use: No    Sexual activity: Not Currently     Social Drivers of Health     Financial Resource Strain: Medium Risk (9/16/2024)    Overall Financial Resource Strain (CARDIA)     Difficulty of Paying Living Expenses: Somewhat hard   Food Insecurity: Food Insecurity Present (5/28/2025)    [facility] and Its Subsidiaries and Affiliates    Hunger Vital Sign     Worried About Running Out of Food in the Last Year: Sometimes true     Ran Out of Food in the Last Year: Sometimes true   Transportation Needs: No Transportation Needs (5/28/2025)    [facility]    PRAPARE - Transportation     Lack of Transportation (Medical): No     Lack of Transportation (Non-Medical): No   Physical Activity: Insufficiently Active (9/16/2024)    Exercise Vital Sign     Days of Exercise per Week: 4 days     Minutes of Exercise per Session: 30 min   Stress: No Stress Concern Present (9/16/2024)    Citizen of [place] of Occupational Health - Occupational Stress Questionnaire     Feeling of Stress : Not at all   Housing Stability: Low Risk  (5/28/2025)    Received from [facility] and Its Subsidiaries and Affiliates    Housing Stability Vital Sign     Unable to Pay for Housing in the Last Year: No     Number of Times Moved in the Last Year: 1     Homeless in the Last Year: No   [2]   Patient Active Problem List  Diagnosis    Diastolic dysfunction    Atrial fibrillation    Lipids abnormal    Pulmonary heart disease    Atherosclerosis of aorta    Chronic diastolic congestive heart failure   [3]   Current Outpatient Medications on File Prior to Visit   Medication Sig Dispense Refill    albuterol (ACCUNEB) 0.63 mg/3 mL Nebu Take 3 mLs (0.63 mg total) by  nebulization every 6 (six) hours as needed (wheezing or shortness of breath). 916 mL 2    apixaban (ELIQUIS) 2.5 mg Tab Take 1 tablet (2.5 mg total) by mouth 2 (two) times daily. 60 tablet 11    blood pressure monitor (BLOOD PRESSURE KIT) Kit 1 kit by Misc.(Non-Drug; Combo Route) route 2 (two) times daily. 1 each 0    BREO ELLIPTA 200-25 mcg/dose DsDv diskus inhaler Inhale 1 puff into the lungs as needed.      furosemide (LASIX) 20 MG tablet Take 2 tablets (40 mg total) by mouth once daily. 60 tablet 5    meloxicam (MOBIC) 15 MG tablet Take 1 tablet (15 mg total) by mouth daily as needed for Pain. 30 tablet 5     Current Facility-Administered Medications on File Prior to Visit   Medication Dose Route Frequency Provider Last Rate Last Admin    aspirin chewable tablet 81 mg  81 mg Oral Daily Herbert Guzman MD

## 2025-06-24 RX ORDER — FUROSEMIDE 20 MG/1
40 TABLET ORAL DAILY
Qty: 60 TABLET | Refills: 5 | Status: SHIPPED | OUTPATIENT
Start: 2025-06-24

## 2025-08-27 DIAGNOSIS — M25.511 PAIN OF BOTH SHOULDER JOINTS: ICD-10-CM

## 2025-08-27 DIAGNOSIS — M25.512 PAIN OF BOTH SHOULDER JOINTS: ICD-10-CM

## 2025-08-27 RX ORDER — MELOXICAM 15 MG/1
15 TABLET ORAL DAILY PRN
Qty: 30 TABLET | Refills: 5 | Status: SHIPPED | OUTPATIENT
Start: 2025-08-27